# Patient Record
Sex: FEMALE | Race: WHITE | Employment: PART TIME | URBAN - METROPOLITAN AREA
[De-identification: names, ages, dates, MRNs, and addresses within clinical notes are randomized per-mention and may not be internally consistent; named-entity substitution may affect disease eponyms.]

---

## 2017-01-13 ENCOUNTER — HOSPITAL ENCOUNTER (OUTPATIENT)
Dept: ONCOLOGY | Age: 72
Discharge: HOME | End: 2017-01-31
Admitting: INTERNAL MEDICINE

## 2017-02-16 ENCOUNTER — HOSPITAL ENCOUNTER (OUTPATIENT)
Dept: ONCOLOGY | Age: 72
Discharge: HOME | End: 2017-02-16
Attending: INTERNAL MEDICINE | Admitting: INTERNAL MEDICINE

## 2017-02-16 ENCOUNTER — HOSPITAL ENCOUNTER (OUTPATIENT)
Dept: ONCOLOGY | Age: 72
Discharge: HOME | End: 2017-02-28
Attending: INTERNAL MEDICINE | Admitting: INTERNAL MEDICINE

## 2017-02-16 VITALS
DIASTOLIC BLOOD PRESSURE: 70 MMHG | SYSTOLIC BLOOD PRESSURE: 135 MMHG | RESPIRATION RATE: 16 BRPM | TEMPERATURE: 97.3 F | HEART RATE: 71 BPM

## 2017-02-16 DIAGNOSIS — D61.818 PANCYTOPENIA (HCC): ICD-10-CM

## 2017-02-16 DIAGNOSIS — D46.Z MDS (MYELODYSPLASTIC SYNDROME), HIGH GRADE (HCC): ICD-10-CM

## 2017-02-16 LAB
ABO/RH: NORMAL
ANTIBODY SCREEN: NORMAL
BLOOD BANK DISPENSE STATUS: NORMAL
BLOOD BANK PRODUCT CODE: NORMAL
BPU ID: NORMAL
DESCRIPTION BLOOD BANK: NORMAL

## 2017-02-16 RX ORDER — SODIUM CHLORIDE 9 MG/ML
INJECTION, SOLUTION INTRAVENOUS CONTINUOUS
Status: ACTIVE | OUTPATIENT
Start: 2017-02-16 | End: 2017-02-17

## 2017-02-16 RX ORDER — DIPHENHYDRAMINE HYDROCHLORIDE 50 MG/ML
50 INJECTION INTRAMUSCULAR; INTRAVENOUS ONCE
Status: CANCELLED | OUTPATIENT
Start: 2017-02-16 | End: 2017-02-16

## 2017-02-16 RX ORDER — SODIUM CHLORIDE 9 MG/ML
INJECTION, SOLUTION INTRAVENOUS CONTINUOUS
Status: CANCELLED | OUTPATIENT
Start: 2017-02-16

## 2017-02-16 RX ORDER — 0.9 % SODIUM CHLORIDE 0.9 %
10 VIAL (ML) INJECTION ONCE
Status: CANCELLED | OUTPATIENT
Start: 2017-02-16 | End: 2017-02-16

## 2017-02-16 RX ORDER — SODIUM CHLORIDE 0.9 % (FLUSH) 0.9 %
20 SYRINGE (ML) INJECTION PRN
Status: ACTIVE | OUTPATIENT
Start: 2017-02-16 | End: 2017-02-17

## 2017-02-16 RX ORDER — SODIUM CHLORIDE 0.9 % (FLUSH) 0.9 %
20 SYRINGE (ML) INJECTION PRN
Status: CANCELLED | OUTPATIENT
Start: 2017-02-16

## 2017-02-16 RX ORDER — ACETAMINOPHEN 325 MG/1
650 TABLET ORAL ONCE
Status: CANCELLED | OUTPATIENT
Start: 2017-02-16 | End: 2017-02-16

## 2017-02-16 RX ORDER — METHYLPREDNISOLONE SODIUM SUCCINATE 125 MG/2ML
125 INJECTION, POWDER, LYOPHILIZED, FOR SOLUTION INTRAMUSCULAR; INTRAVENOUS ONCE
Status: CANCELLED | OUTPATIENT
Start: 2017-02-16 | End: 2017-02-16

## 2017-02-16 RX ORDER — ACETAMINOPHEN 325 MG/1
650 TABLET ORAL ONCE
Status: DISCONTINUED | OUTPATIENT
Start: 2017-02-16 | End: 2017-02-18 | Stop reason: HOSPADM

## 2017-06-01 ENCOUNTER — HOSPITAL ENCOUNTER (OUTPATIENT)
Dept: ONCOLOGY | Age: 72
Discharge: HOME | End: 2017-06-30
Admitting: INTERNAL MEDICINE

## 2017-06-01 ENCOUNTER — TELEPHONE (OUTPATIENT)
Dept: SURGERY | Age: 72
End: 2017-06-01

## 2017-06-01 VITALS
HEART RATE: 80 BPM | WEIGHT: 139.99 LBS | BODY MASS INDEX: 24.8 KG/M2 | HEIGHT: 63 IN | DIASTOLIC BLOOD PRESSURE: 76 MMHG | SYSTOLIC BLOOD PRESSURE: 148 MMHG

## 2017-06-01 LAB
APTT: 31.1 SEC (ref 21–31.8)
BASOPHILS ABSOLUTE: 0.3 K/UL (ref 0–0.2)
BASOPHILS RELATIVE PERCENT: 7.6 %
BILIRUBIN DIRECT: <0.2 MG/DL (ref 0–0.3)
BILIRUBIN URINE: NEGATIVE
BLOOD, URINE: ABNORMAL
CLARITY: CLEAR
COLOR: YELLOW
EOSINOPHILS ABSOLUTE: 0 K/UL (ref 0–0.6)
EOSINOPHILS RELATIVE PERCENT: 0.4 %
FERRITIN: 206 NG/ML (ref 15–150)
GLUCOSE URINE: NEGATIVE MG/DL
HAV IGM SER IA-ACNC: NORMAL
HBV SURFACE AB TITR SER: <3.5 MUL/ML
HCT VFR BLD CALC: 34.2 % (ref 36–48)
HEMOGLOBIN: 11.3 G/DL (ref 12–16)
HEPATITIS B SURFACE ANTIGEN INTERPRETATION: NORMAL
HEPATITIS C ANTIBODY INTERPRETATION: NORMAL
INR BLD: 1.03 (ref 0.85–1.15)
KETONES, URINE: NEGATIVE MG/DL
LACTATE DEHYDROGENASE: 348 U/L (ref 100–190)
LEUKOCYTE ESTERASE, URINE: NEGATIVE
LYMPHOCYTES ABSOLUTE: 0.6 K/UL (ref 1–5.1)
LYMPHOCYTES RELATIVE PERCENT: 14.5 %
MACROCYTES: ABNORMAL
MCH RBC QN AUTO: 35.5 PG (ref 26–34)
MCHC RBC AUTO-ENTMCNC: 33.2 G/DL (ref 31–36)
MCV RBC AUTO: 106.9 FL (ref 80–100)
MICROSCOPIC EXAMINATION: YES
MONOCYTES ABSOLUTE: 0 K/UL (ref 0–1.3)
MONOCYTES RELATIVE PERCENT: 0.8 %
NEUTROPHILS ABSOLUTE: 3.4 K/UL (ref 1.7–7.7)
NEUTROPHILS RELATIVE PERCENT: 76.7 %
NITRITE, URINE: NEGATIVE
PDW BLD-RTO: 17.8 % (ref 12.4–15.4)
PH UA: 6
PHOSPHORUS: 3 MG/DL (ref 2.5–4.9)
PLATELET # BLD: 14 K/UL (ref 135–450)
PMV BLD AUTO: 10.5 FL (ref 5–10.5)
PROTEIN UA: NEGATIVE MG/DL
PROTHROMBIN TIME: 11.6 SEC (ref 9.6–13)
RBC # BLD: 3.19 M/UL (ref 4–5.2)
SPECIFIC GRAVITY UA: 1.01
URIC ACID, SERUM: 6.8 MG/DL (ref 2.6–6)
URINE TYPE: ABNORMAL
UROBILINOGEN, URINE: 0.2 E.U./DL
WBC # BLD: 4.4 K/UL (ref 4–11)

## 2017-06-01 RX ORDER — HEPARIN SODIUM (PORCINE) LOCK FLUSH IV SOLN 100 UNIT/ML 100 UNIT/ML
300 SOLUTION INTRAVENOUS PRN
Status: DISCONTINUED | OUTPATIENT
Start: 2017-06-01 | End: 2017-06-03 | Stop reason: HOSPADM

## 2017-06-01 RX ADMIN — HEPARIN SODIUM (PORCINE) LOCK FLUSH IV SOLN 100 UNIT/ML 300 UNITS: 100 SOLUTION at 11:31

## 2017-06-01 ASSESSMENT — PAIN SCALES - GENERAL: PAINLEVEL_OUTOF10: 0

## 2017-06-02 LAB — RPR: NORMAL

## 2017-06-03 LAB
EPSTEIN BARR VIRUS NUCLEAR AB IGG: 229 U/ML (ref 0–21.9)
EPSTEIN-BARR EARLY ANTIGEN ANTIBODY: 46.1 U/ML (ref 0–10.9)
EPSTEIN-BARR VCA IGG: >750 U/ML (ref 0–21.9)
EPSTEIN-BARR VCA IGM: <10 U/ML (ref 0–43.9)
HEPATITIS B CORE TOTAL ANTIBODY: NEGATIVE
HEPATITIS BE ANTIBODY: NEGATIVE
HERPES TYPE 1/2 IGM COMBINED: 1.52 IV
HERPES TYPE I/II IGG COMBINED: >22.4 IV
HIV RNA PCR INTERPRETATION: NOT DETECTED
HIV-1 RNA BY PCR, QN: <1.3 LOG
HIV-1 RNA BY PCR, QN: <20 CPY/ML
HTLV I/II AB: NEGATIVE
TOXOPLASMA GONDI AB IGM: <3 AU/ML
TOXOPLASMA GONDII AB IGG: <3 IU/ML

## 2017-06-04 LAB
EER HCV RNA QNT PCR: NORMAL
HCV RNA QNT REAL-TIME PCR INTERP: NOT DETECTED
HCV RNA, QUANTITATIVE REAL TIME PCR: <1.2 LOG IU
HEPATITIS C RNA PCR QUANT: <15 IU/ML
TRYPANOSOMA CRUZI IGM ANTIBODY: NORMAL
VARICELLA ZOSTER AB IGM: 0.39 ISR
VZV IGG SER QL IA: 1079 IV
WEST NILE VIRUS, IGG: 0.33 IV
WEST NILE VIRUS, IGM: 0.05 IV

## 2017-06-05 ENCOUNTER — HOSPITAL ENCOUNTER (OUTPATIENT)
Dept: PULMONOLOGY | Age: 72
Discharge: HOME | End: 2017-06-05
Attending: INTERNAL MEDICINE | Admitting: INTERNAL MEDICINE

## 2017-06-05 DIAGNOSIS — J32.9 CHRONIC SINUSITIS, UNSPECIFIED LOCATION: ICD-10-CM

## 2017-06-05 DIAGNOSIS — R06.02 SHORTNESS OF BREATH: ICD-10-CM

## 2017-06-05 LAB
CMV DNA QNT PCR: <2.6 LOG CPY/ML
CMV DNA QUANTATATIVE INTERPRETATION: <2.4 LOG IU/ML
CMV DNA QUANTATATIVE INTERPRETATION: NOT DETECTED
CMV DNA QUANTITATIVE: <227 IU/ML
CMV SOURCE: NORMAL
CMVQ COPY/ML: <390 CPY/ML
HIV-1 AND HIV-2 ANTIBODIES: NEGATIVE
LV EF: 58 %
LVEF MODALITY: NORMAL

## 2017-06-06 LAB — TRYPANOSOMA CRUZI IGG ANTIBODY: 0.38 IV

## 2017-06-12 ENCOUNTER — HOSPITAL ENCOUNTER (OUTPATIENT)
Dept: ONCOLOGY | Age: 72
Discharge: HOME | End: 2017-06-12
Attending: INTERNAL MEDICINE | Admitting: INTERNAL MEDICINE

## 2017-06-12 LAB
ABO/RH: NORMAL
ANTIBODY SCREEN: NORMAL
EKG ATRIAL RATE: 74 BPM
EKG DIAGNOSIS: NORMAL
EKG P AXIS: 68 DEGREES
EKG P-R INTERVAL: 142 MS
EKG Q-T INTERVAL: 380 MS
EKG QRS DURATION: 82 MS
EKG QTC CALCULATION (BAZETT): 421 MS
EKG R AXIS: 44 DEGREES
EKG T AXIS: 46 DEGREES
EKG VENTRICULAR RATE: 74 BPM

## 2017-06-12 PROCEDURE — 93010 ELECTROCARDIOGRAM REPORT: CPT | Performed by: INTERNAL MEDICINE

## 2017-06-12 RX ORDER — HEPARIN SODIUM (PORCINE) LOCK FLUSH IV SOLN 100 UNIT/ML 100 UNIT/ML
300 SOLUTION INTRAVENOUS PRN
Status: DISCONTINUED | OUTPATIENT
Start: 2017-06-12 | End: 2017-06-14 | Stop reason: HOSPADM

## 2017-06-12 RX ADMIN — HEPARIN SODIUM (PORCINE) LOCK FLUSH IV SOLN 100 UNIT/ML 300 UNITS: 100 SOLUTION at 14:54

## 2017-06-20 ENCOUNTER — HOSPITAL ENCOUNTER (OUTPATIENT)
Dept: PREADMISSION TESTING | Age: 72
Discharge: HOME | End: 2017-06-20
Attending: SURGERY | Admitting: SURGERY

## 2017-06-20 VITALS — WEIGHT: 142 LBS | HEIGHT: 63 IN | BODY MASS INDEX: 25.16 KG/M2

## 2017-06-20 RX ORDER — DIAPER,BRIEF,INFANT-TODD,DISP
EACH MISCELLANEOUS PRN
Status: ON HOLD | COMMUNITY
End: 2017-07-18 | Stop reason: HOSPADM

## 2017-06-22 PROBLEM — Z94.81 H/O ALLOGENEIC BONE MARROW TRANSPLANT (HCC): Status: ACTIVE | Noted: 2017-06-22

## 2017-07-25 PROBLEM — E83.42 HYPOMAGNESEMIA: Status: ACTIVE | Noted: 2017-07-25

## 2017-07-29 PROBLEM — Z48.290 ENCOUNTER FOR AFTERCARE FOLLOWING BONE MARROW TRANSPLANT (HCC): Status: ACTIVE | Noted: 2017-07-29

## 2017-07-29 PROBLEM — Z51.81 ENCOUNTER FOR THERAPEUTIC DRUG MONITORING: Status: ACTIVE | Noted: 2017-07-29

## 2017-08-16 PROBLEM — N17.9 AKI (ACUTE KIDNEY INJURY) (HCC): Status: ACTIVE | Noted: 2017-08-16

## 2017-10-18 ENCOUNTER — PROCEDURE VISIT (OUTPATIENT)
Dept: SURGERY | Age: 72
End: 2017-10-18

## 2017-10-18 VITALS — DIASTOLIC BLOOD PRESSURE: 78 MMHG | SYSTOLIC BLOOD PRESSURE: 120 MMHG | HEIGHT: 62 IN

## 2017-10-18 DIAGNOSIS — Z48.290 ENCOUNTER FOR AFTERCARE FOLLOWING BONE MARROW TRANSPLANT (HCC): Primary | ICD-10-CM

## 2017-10-18 PROCEDURE — 36589 REMOVAL TUNNELED CV CATH: CPT | Performed by: SURGERY

## 2017-10-18 PROCEDURE — 1036F TOBACCO NON-USER: CPT | Performed by: SURGERY

## 2017-10-18 NOTE — PROGRESS NOTES
TUNNELED CATHETER REMOVAL - PROCEDURE NOTE:    Patient here for right sided tunneled central venous catheter removal, as recommended by patient's oncologist. Most recent oncology note and CBC reviewed. Previous operative note reviewed as necessary. Informed consent obtained. Risks of procedure explained to patient, including but not limited to: bleeding, infection, need for subsequent procedures. RIght neck and upper chest prepped and draped in sterile fashion using chlorhexidine prep solution, including the catheter as it exits the skin. Previously placed sutures were cut. 10 cc of 1% lidocaine used for local anesthetic around the exit site. Curved hemostat used to slowly and methodically dissect the cuff free from subcutaneous tissues. Once cuff was confirmed mobile and free, the catheter was removed during full inhalation. Direct pressure was used to obtain hemostasis. Sterile guaze and Tegaderm used for dressing. Patient tolerated the procedure well without complication. Wound care and discharge instructions were explained to patient, as below. Patient discharged home. DISCHARGE INSTRUCTIONS:    You may shower in 24 hours. Remove the dressing before showering. Let water run over your incision site, but there is no need to scrub it. After showering, pat dry and cover with a small dry guaze and tegaderm dressing (or tape). After about 1 week, a Band-Aid can be used. Avoid tub baths and swimming until the wound has completely healed. Healing time may vary, but typically takes 2-4 weeks. You may have a small amount of bleeding or red drainage for a few days, but this will go away as the wound heals from the inside out. Call the office ([phone]) right away or come to the emergency department if you experience fever, chills, swelling, decreased appetite, worsening pain, redness, foul drainage, or uncontrolled bleeding from your wound.     Please call the office at [phone] if you have ANY

## 2018-01-08 ENCOUNTER — HOSPITAL ENCOUNTER (OUTPATIENT)
Dept: CT IMAGING | Age: 73
Discharge: HOME | End: 2018-01-08
Attending: INTERNAL MEDICINE | Admitting: INTERNAL MEDICINE

## 2018-01-08 DIAGNOSIS — Z48.290: ICD-10-CM

## 2018-01-08 DIAGNOSIS — R51.9 HEADACHE: ICD-10-CM

## 2018-07-30 ENCOUNTER — HOSPITAL ENCOUNTER (OUTPATIENT)
Dept: GENERAL RADIOLOGY | Age: 73
Discharge: HOME | End: 2018-07-30
Payer: COMMERCIAL

## 2018-07-30 ENCOUNTER — HOSPITAL ENCOUNTER (OUTPATIENT)
Dept: PULMONOLOGY | Age: 73
Discharge: HOME | End: 2018-07-30
Payer: COMMERCIAL

## 2018-07-30 VITALS — OXYGEN SATURATION: 96 %

## 2018-07-30 DIAGNOSIS — N95.1 MENOPAUSAL SYNDROME: ICD-10-CM

## 2018-07-30 DIAGNOSIS — Z13.820 ENCOUNTER FOR IMAGING TO ASSESS OSTEOPOROSIS: ICD-10-CM

## 2018-07-30 PROCEDURE — 94664 DEMO&/EVAL PT USE INHALER: CPT

## 2018-07-30 PROCEDURE — 94726 PLETHYSMOGRAPHY LUNG VOLUMES: CPT

## 2018-07-30 PROCEDURE — 77080 DXA BONE DENSITY AXIAL: CPT

## 2018-07-30 PROCEDURE — 94010 BREATHING CAPACITY TEST: CPT

## 2018-07-30 PROCEDURE — 94760 N-INVAS EAR/PLS OXIMETRY 1: CPT

## 2018-07-30 PROCEDURE — 94729 DIFFUSING CAPACITY: CPT

## 2018-07-30 RX ORDER — ALBUTEROL SULFATE 2.5 MG/3ML
2.5 SOLUTION RESPIRATORY (INHALATION) ONCE
Status: DISCONTINUED | OUTPATIENT
Start: 2018-07-30 | End: 2018-07-31 | Stop reason: HOSPADM

## 2018-10-16 ENCOUNTER — ANESTHESIA EVENT (OUTPATIENT)
Dept: ENDOSCOPY | Age: 73
End: 2018-10-16
Payer: COMMERCIAL

## 2018-10-16 ENCOUNTER — HOSPITAL ENCOUNTER (OUTPATIENT)
Age: 73
Setting detail: OUTPATIENT SURGERY
Discharge: HOME | End: 2018-10-16
Attending: INTERNAL MEDICINE | Admitting: INTERNAL MEDICINE
Payer: COMMERCIAL

## 2018-10-16 ENCOUNTER — ANESTHESIA (OUTPATIENT)
Dept: ENDOSCOPY | Age: 73
End: 2018-10-16
Payer: COMMERCIAL

## 2018-10-16 VITALS
WEIGHT: 119 LBS | DIASTOLIC BLOOD PRESSURE: 77 MMHG | OXYGEN SATURATION: 98 % | HEART RATE: 70 BPM | BODY MASS INDEX: 21.9 KG/M2 | HEIGHT: 62 IN | RESPIRATION RATE: 18 BRPM | TEMPERATURE: 97.9 F | SYSTOLIC BLOOD PRESSURE: 140 MMHG

## 2018-10-16 VITALS — OXYGEN SATURATION: 97 % | SYSTOLIC BLOOD PRESSURE: 138 MMHG | DIASTOLIC BLOOD PRESSURE: 77 MMHG

## 2018-10-16 PROCEDURE — 6360000002 HC RX W HCPCS: Performed by: NURSE ANESTHETIST, CERTIFIED REGISTERED

## 2018-10-16 PROCEDURE — 2500000003 HC RX 250 WO HCPCS: Performed by: NURSE ANESTHETIST, CERTIFIED REGISTERED

## 2018-10-16 PROCEDURE — 2580000003 HC RX 258: Performed by: NURSE ANESTHETIST, CERTIFIED REGISTERED

## 2018-10-16 PROCEDURE — C1726 CATH, BAL DIL, NON-VASCULAR: HCPCS | Performed by: INTERNAL MEDICINE

## 2018-10-16 PROCEDURE — 3700000000 HC ANESTHESIA ATTENDED CARE: Performed by: INTERNAL MEDICINE

## 2018-10-16 PROCEDURE — 7100000011 HC PHASE II RECOVERY - ADDTL 15 MIN: Performed by: INTERNAL MEDICINE

## 2018-10-16 PROCEDURE — 3700000001 HC ADD 15 MINUTES (ANESTHESIA): Performed by: INTERNAL MEDICINE

## 2018-10-16 PROCEDURE — 2709999900 HC NON-CHARGEABLE SUPPLY: Performed by: INTERNAL MEDICINE

## 2018-10-16 PROCEDURE — 3609017700 HC EGD DILATION GASTRIC/DUODENAL STRICTURE: Performed by: INTERNAL MEDICINE

## 2018-10-16 PROCEDURE — 7100000010 HC PHASE II RECOVERY - FIRST 15 MIN: Performed by: INTERNAL MEDICINE

## 2018-10-16 RX ORDER — PROPOFOL 10 MG/ML
INJECTION, EMULSION INTRAVENOUS PRN
Status: DISCONTINUED | OUTPATIENT
Start: 2018-10-16 | End: 2018-10-16 | Stop reason: SDUPTHER

## 2018-10-16 RX ORDER — TRIAMCINOLONE ACETONIDE 40 MG/ML
40 INJECTION, SUSPENSION INTRA-ARTICULAR; INTRAMUSCULAR ONCE
Status: DISCONTINUED | OUTPATIENT
Start: 2018-10-16 | End: 2018-10-16 | Stop reason: HOSPADM

## 2018-10-16 RX ORDER — ONDANSETRON 2 MG/ML
INJECTION INTRAMUSCULAR; INTRAVENOUS PRN
Status: DISCONTINUED | OUTPATIENT
Start: 2018-10-16 | End: 2018-10-16 | Stop reason: SDUPTHER

## 2018-10-16 RX ORDER — LIDOCAINE HYDROCHLORIDE 20 MG/ML
INJECTION, SOLUTION EPIDURAL; INFILTRATION; INTRACAUDAL; PERINEURAL PRN
Status: DISCONTINUED | OUTPATIENT
Start: 2018-10-16 | End: 2018-10-16 | Stop reason: SDUPTHER

## 2018-10-16 RX ORDER — SODIUM CHLORIDE, SODIUM LACTATE, POTASSIUM CHLORIDE, CALCIUM CHLORIDE 600; 310; 30; 20 MG/100ML; MG/100ML; MG/100ML; MG/100ML
INJECTION, SOLUTION INTRAVENOUS CONTINUOUS PRN
Status: DISCONTINUED | OUTPATIENT
Start: 2018-10-16 | End: 2018-10-16 | Stop reason: SDUPTHER

## 2018-10-16 RX ORDER — DEXLANSOPRAZOLE 60 MG/1
60 CAPSULE, DELAYED RELEASE ORAL DAILY
COMMUNITY
End: 2022-08-19

## 2018-10-16 RX ORDER — DAPSONE 100 MG/1
100 TABLET ORAL DAILY
Status: ON HOLD | COMMUNITY
Start: 2017-08-10 | End: 2018-10-16

## 2018-10-16 RX ORDER — GLYCOPYRROLATE 0.2 MG/ML
INJECTION INTRAMUSCULAR; INTRAVENOUS PRN
Status: DISCONTINUED | OUTPATIENT
Start: 2018-10-16 | End: 2018-10-16 | Stop reason: SDUPTHER

## 2018-10-16 RX ADMIN — PROPOFOL 50 MG: 10 INJECTION, EMULSION INTRAVENOUS at 14:28

## 2018-10-16 RX ADMIN — SODIUM CHLORIDE, SODIUM LACTATE, POTASSIUM CHLORIDE, AND CALCIUM CHLORIDE: 600; 310; 30; 20 INJECTION, SOLUTION INTRAVENOUS at 14:10

## 2018-10-16 RX ADMIN — PROPOFOL 50 MG: 10 INJECTION, EMULSION INTRAVENOUS at 14:24

## 2018-10-16 RX ADMIN — GLYCOPYRROLATE 0.1 MG: 0.2 INJECTION, SOLUTION INTRAMUSCULAR; INTRAVENOUS at 14:15

## 2018-10-16 RX ADMIN — ONDANSETRON 4 MG: 2 INJECTION INTRAMUSCULAR; INTRAVENOUS at 14:30

## 2018-10-16 RX ADMIN — LIDOCAINE HYDROCHLORIDE 50 MG: 20 INJECTION, SOLUTION EPIDURAL; INFILTRATION; INTRACAUDAL; PERINEURAL at 14:20

## 2018-10-16 RX ADMIN — LIDOCAINE HYDROCHLORIDE 50 MG: 20 INJECTION, SOLUTION EPIDURAL; INFILTRATION; INTRACAUDAL; PERINEURAL at 14:16

## 2018-10-16 RX ADMIN — PROPOFOL 50 MG: 10 INJECTION, EMULSION INTRAVENOUS at 14:19

## 2018-10-16 RX ADMIN — PROPOFOL 50 MG: 10 INJECTION, EMULSION INTRAVENOUS at 14:14

## 2018-10-16 ASSESSMENT — PULMONARY FUNCTION TESTS
PIF_VALUE: 0
PIF_VALUE: 1
PIF_VALUE: 0

## 2018-10-16 ASSESSMENT — PAIN - FUNCTIONAL ASSESSMENT: PAIN_FUNCTIONAL_ASSESSMENT: 0-10

## 2018-10-16 NOTE — ANESTHESIA PRE PROCEDURE
([facility] D61.818    Anemia D64.9    MUD - Allo BMT Z94.81    Hypomagnesemia E83.42    Encounter for aftercare following bone marrow transplant ([facility] Z48.290    Encounter for therapeutic drug monitoring Z51.81    DEMETRIA (acute kidney injury) ([facility] N17.9       Past Medical History:        Diagnosis Date    Cancer [facility])     MDS    Clostridium difficile diarrhea 2017    History of blood transfusion     Hot flashes     Pneumonia 2012    Thrombocytopenia [facility])        Past Surgical History:        Procedure Laterality Date     SECTION, CLASSIC      x 2    COLONOSCOPY  2013    polyp    LASIK      OTHER SURGICAL HISTORY  2017    INSERTION OF TRIPLE LUMEN AGUILAR CATHETER RIGHT SUBCLAVIAN.  TUBAL LIGATION      TUNNELED VENOUS PORT PLACEMENT  2015    PORT A CATH       Social History:    Social History   Substance Use Topics    Smoking status: Former Smoker     Years: 20.00     Quit date: 1/3/1985    Smokeless tobacco: Never Used      Comment: QUIT     Alcohol use 1.2 oz/week     2 Glasses of wine per week                                Counseling given: Not Answered      Vital Signs (Current):   Vitals:    10/16/18 1246   BP: (!) 157/75   Pulse: 70   Resp: 18   Temp: 97.9 °F (36.6 °C)   TempSrc: Oral   SpO2: 99%   Weight: 119 lb (54 kg)   Height: 5' 2\" (1.575 m)                                              BP Readings from Last 3 Encounters:   10/16/18 (!) 157/75   10/18/17 120/78   17 122/66       NPO Status: Time of last liquid consumption: 2100                        Time of last solid consumption: 1800                        Date of last liquid consumption: 10/15/18                        Date of last solid food consumption: 10/15/18    BMI:   Wt Readings from Last 3 Encounters:   10/16/18 119 lb (54 kg)   17 116 lb 3.2 oz (52.7 kg)   17 117 lb (53.1 kg)     Body mass index is 21.77 kg/m².     CBC:   Lab Results   Component Value Date    WBC 10.8 08/25/2017    WBC 2.1 07/18/2017    RBC 2.99 08/25/2017    HGB 10.3 08/25/2017    HCT 33.3 08/25/2017    .5 08/25/2017    RDW 18.7 08/25/2017     08/25/2017       CMP:   Lab Results   Component Value Date     08/25/2017    K 5.0 08/25/2017     08/25/2017    CO2 23 08/25/2017    BUN 37 08/25/2017    CREATININE 1.3 08/25/2017    GFRAA 54 07/18/2017    GFRAA >60 01/03/2013    AGRATIO 1.6 08/23/2017    LABGLOM 43.0 08/25/2017    GLUCOSE 97 08/25/2017    PROT 6.6 08/25/2017    CALCIUM 9.9 08/25/2017    BILITOT 0.8 08/25/2017    ALKPHOS 75 08/25/2017    ALKPHOS 77 07/17/2017    AST 29 08/25/2017    ALT 37 08/25/2017       POC Tests: No results for input(s): POCGLU, POCNA, POCK, POCCL, POCBUN, POCHEMO, POCHCT in the last 72 hours. Coags:   Lab Results   Component Value Date    PROTIME 14.9 07/17/2017    INR 1.32 07/17/2017    APTT 30.7 07/17/2017       HCG (If Applicable): No results found for: PREGTESTUR, PREGSERUM, HCG, HCGQUANT     ABGs: No results found for: PHART, PO2ART, GJP2CAU, QAB5LKY, BEART, R5AHHFCO     Type & Screen (If Applicable):  No results found for: LABABO, [address]    Anesthesia Evaluation  Patient summary reviewed and Nursing notes reviewed no history of anesthetic complications:   Airway: Mallampati: I  TM distance: >3 FB   Neck ROM: full  Mouth opening: > = 3 FB Dental: normal exam         Pulmonary:normal exam    (+) pneumonia:                             Cardiovascular:Negative CV ROS                      Neuro/Psych:   Negative Neuro/Psych ROS              GI/Hepatic/Renal: Neg GI/Hepatic/Renal ROS            Endo/Other: Negative Endo/Other ROS                     ROS comment: Myelodysplastic syndrome  GVHD Abdominal:           Vascular: negative vascular ROS. Anesthesia Plan      MAC     ASA 2             Anesthetic plan and risks discussed with patient and spouse. Plan discussed with CRNA.     Attending anesthesiologist reviewed and agrees with Pre Eval content              Riana Quiroga MD   10/16/2018

## 2019-01-07 ENCOUNTER — HOSPITAL ENCOUNTER (OUTPATIENT)
Dept: MAMMOGRAPHY | Age: 74
Discharge: HOME | End: 2019-01-07
Payer: COMMERCIAL

## 2019-01-07 DIAGNOSIS — Z12.31 VISIT FOR SCREENING MAMMOGRAM: ICD-10-CM

## 2019-01-07 PROCEDURE — 77063 BREAST TOMOSYNTHESIS BI: CPT

## 2019-08-05 ENCOUNTER — HOSPITAL ENCOUNTER (OUTPATIENT)
Dept: NON INVASIVE DIAGNOSTICS | Age: 74
Discharge: HOME | End: 2019-08-05
Payer: COMMERCIAL

## 2019-08-05 ENCOUNTER — HOSPITAL ENCOUNTER (OUTPATIENT)
Dept: PULMONOLOGY | Age: 74
Discharge: HOME | End: 2019-08-05
Payer: COMMERCIAL

## 2019-08-05 LAB
LV EF: 53 %
LVEF MODALITY: NORMAL

## 2019-08-05 PROCEDURE — 94010 BREATHING CAPACITY TEST: CPT

## 2019-08-05 PROCEDURE — 94375 RESPIRATORY FLOW VOLUME LOOP: CPT

## 2019-08-05 PROCEDURE — 94664 DEMO&/EVAL PT USE INHALER: CPT

## 2019-08-05 PROCEDURE — 94729 DIFFUSING CAPACITY: CPT

## 2019-08-05 PROCEDURE — 93306 TTE W/DOPPLER COMPLETE: CPT

## 2019-08-05 PROCEDURE — 94760 N-INVAS EAR/PLS OXIMETRY 1: CPT

## 2019-08-10 NOTE — PROCEDURES
[address]               [address] [place], [address]                               PULMONARY FUNCTION    PATIENT NAME: Maliha Nagy                  :        1945  MED REC NO:   [id number]                          ROOM:  ACCOUNT NO:   [de-identified]                           ADMIT DATE: 2019  PROVIDER:     Rachel Dave MD    DATE OF PROCEDURE:  2019    INTERPRETATION:  Spirometry on this patient shows an FEV1 of 1.68, which  is 81% of predicted and forced vital capacity of 2.21, which is also 80%  of predicted giving a ratio of 76. Lung volumes are within normal range  with a total lung of capacity of 112% and residual volume, which is  elevated to 145. Diffusion capacity was moderately decreased, but  corrected for alveolar lung volume. There was no hemoglobin adjustment  for this study. CONCLUSION:  Normal spirometry with elevated residual volume of  questionable clinical significance and low normal diffusion. There is  some concavity to patient's flow volume loops so there is a concern for  airways obstruction. A bronchodilator challenge may be beneficial.   There were no comparison studies for this PFT at the time of this  reading.         Renzo Hardy MD    D: 2019 17:46:39       T: 08/10/2019 2:49:03     [email or web address]  Job#: [id number]     Doc#: [id number]    CC:

## 2020-01-16 ENCOUNTER — HOSPITAL ENCOUNTER (OUTPATIENT)
Dept: ONCOLOGY | Age: 75
Setting detail: INFUSION SERIES
Discharge: HOME | End: 2020-01-16
Payer: COMMERCIAL

## 2020-01-16 LAB
RAPID INFLUENZA  B AGN: POSITIVE
RAPID INFLUENZA A AGN: NEGATIVE

## 2020-01-16 PROCEDURE — 89220 SPUTUM SPECIMEN COLLECTION: CPT

## 2020-01-16 PROCEDURE — 87804 INFLUENZA ASSAY W/OPTIC: CPT

## 2020-02-06 ENCOUNTER — HOSPITAL ENCOUNTER (OUTPATIENT)
Dept: GENERAL RADIOLOGY | Age: 75
Discharge: HOME | End: 2020-02-06
Payer: COMMERCIAL

## 2020-02-06 PROCEDURE — 71046 X-RAY EXAM CHEST 2 VIEWS: CPT

## 2020-02-24 ENCOUNTER — HOSPITAL ENCOUNTER (OUTPATIENT)
Dept: MAMMOGRAPHY | Age: 75
Discharge: HOME | End: 2020-02-24
Payer: COMMERCIAL

## 2020-02-24 PROCEDURE — 77063 BREAST TOMOSYNTHESIS BI: CPT

## 2020-07-06 ENCOUNTER — NURSE ONLY (OUTPATIENT)
Dept: PRIMARY CARE CLINIC | Age: 75
End: 2020-07-06
Payer: COMMERCIAL

## 2020-07-06 PROCEDURE — 99211 OFF/OP EST MAY X REQ PHY/QHP: CPT | Performed by: NURSE PRACTITIONER

## 2020-07-06 NOTE — PROGRESS NOTES
Dottie Ramos received a viral test for COVID-19. They were educated on isolation and quarantine as appropriate. For any symptoms, they were directed to seek care from their PCP, given contact information to establish with a doctor, directed to an urgent care or the emergency room. Swab and go Covid testing for preop.

## 2020-07-09 LAB
SARS-COV-2: NOT DETECTED
SOURCE: NORMAL

## 2020-07-10 ENCOUNTER — HOSPITAL ENCOUNTER (OUTPATIENT)
Dept: PULMONOLOGY | Age: 75
Discharge: HOME | End: 2020-07-10
Payer: COMMERCIAL

## 2020-07-10 ENCOUNTER — HOSPITAL ENCOUNTER (OUTPATIENT)
Dept: NON INVASIVE DIAGNOSTICS | Age: 75
Discharge: HOME | End: 2020-07-10
Payer: COMMERCIAL

## 2020-07-10 ENCOUNTER — HOSPITAL ENCOUNTER (OUTPATIENT)
Dept: GENERAL RADIOLOGY | Age: 75
Discharge: HOME | End: 2020-07-10
Payer: COMMERCIAL

## 2020-07-10 VITALS — OXYGEN SATURATION: 94 %

## 2020-07-10 LAB
LV EF: 58 %
LVEF MODALITY: NORMAL

## 2020-07-10 PROCEDURE — 94664 DEMO&/EVAL PT USE INHALER: CPT

## 2020-07-10 PROCEDURE — 94010 BREATHING CAPACITY TEST: CPT

## 2020-07-10 PROCEDURE — 94760 N-INVAS EAR/PLS OXIMETRY 1: CPT

## 2020-07-10 PROCEDURE — 94726 PLETHYSMOGRAPHY LUNG VOLUMES: CPT

## 2020-07-10 PROCEDURE — 93306 TTE W/DOPPLER COMPLETE: CPT

## 2020-07-10 PROCEDURE — 77080 DXA BONE DENSITY AXIAL: CPT

## 2020-07-10 PROCEDURE — 94729 DIFFUSING CAPACITY: CPT

## 2020-07-10 PROCEDURE — 93356 MYOCRD STRAIN IMG SPCKL TRCK: CPT

## 2020-11-19 ENCOUNTER — HOSPITAL ENCOUNTER (OUTPATIENT)
Dept: CT IMAGING | Age: 75
Discharge: HOME | End: 2020-11-19
Payer: COMMERCIAL

## 2020-11-19 PROCEDURE — 73700 CT LOWER EXTREMITY W/O DYE: CPT

## 2021-02-26 ENCOUNTER — HOSPITAL ENCOUNTER (OUTPATIENT)
Dept: MAMMOGRAPHY | Age: 76
Discharge: HOME | End: 2021-02-26
Payer: COMMERCIAL

## 2021-02-26 DIAGNOSIS — Z12.31 VISIT FOR SCREENING MAMMOGRAM: ICD-10-CM

## 2021-02-26 PROCEDURE — 77063 BREAST TOMOSYNTHESIS BI: CPT

## 2021-04-23 ENCOUNTER — HOSPITAL ENCOUNTER (OUTPATIENT)
Dept: GENERAL RADIOLOGY | Age: 76
Discharge: HOME | End: 2021-04-23
Payer: COMMERCIAL

## 2021-04-23 ENCOUNTER — HOSPITAL ENCOUNTER (OUTPATIENT)
Dept: VASCULAR LAB | Age: 76
Discharge: HOME | End: 2021-04-23
Payer: COMMERCIAL

## 2021-04-23 DIAGNOSIS — R60.0 LOCALIZED EDEMA: ICD-10-CM

## 2021-04-23 DIAGNOSIS — R60.0 LEG EDEMA: ICD-10-CM

## 2021-04-23 DIAGNOSIS — D46.20 MYELODYSPLASTIC SYNDROME, LOW GRADE (HCC): ICD-10-CM

## 2021-04-23 PROCEDURE — 93971 EXTREMITY STUDY: CPT

## 2021-04-23 PROCEDURE — 73562 X-RAY EXAM OF KNEE 3: CPT

## 2021-06-28 ENCOUNTER — HOSPITAL ENCOUNTER (OUTPATIENT)
Dept: NON INVASIVE DIAGNOSTICS | Age: 76
Discharge: HOME | End: 2021-06-28
Payer: COMMERCIAL

## 2021-06-28 ENCOUNTER — HOSPITAL ENCOUNTER (OUTPATIENT)
Dept: PULMONOLOGY | Age: 76
Discharge: HOME | End: 2021-06-28
Payer: COMMERCIAL

## 2021-06-28 DIAGNOSIS — Z48.290 ENCOUNTER FOR AFTERCARE FOLLOWING BONE MARROW TRANSPLANT (HCC): ICD-10-CM

## 2021-06-28 LAB
LV EF: 58 %
LVEF MODALITY: NORMAL

## 2021-06-28 PROCEDURE — 93306 TTE W/DOPPLER COMPLETE: CPT

## 2021-06-28 PROCEDURE — 94010 BREATHING CAPACITY TEST: CPT

## 2021-06-28 PROCEDURE — 94726 PLETHYSMOGRAPHY LUNG VOLUMES: CPT

## 2021-06-28 PROCEDURE — 93356 MYOCRD STRAIN IMG SPCKL TRCK: CPT

## 2021-06-28 PROCEDURE — 94729 DIFFUSING CAPACITY: CPT

## 2021-06-28 PROCEDURE — 94760 N-INVAS EAR/PLS OXIMETRY 1: CPT

## 2021-06-28 PROCEDURE — 94664 DEMO&/EVAL PT USE INHALER: CPT

## 2022-04-26 ENCOUNTER — HOSPITAL ENCOUNTER (OUTPATIENT)
Dept: MAMMOGRAPHY | Age: 77
Discharge: HOME | End: 2022-04-26
Payer: MEDICARE

## 2022-04-26 VITALS — BODY MASS INDEX: 23.37 KG/M2 | HEIGHT: 62 IN | WEIGHT: 127 LBS

## 2022-04-26 DIAGNOSIS — Z12.31 VISIT FOR SCREENING MAMMOGRAM: ICD-10-CM

## 2022-04-26 PROCEDURE — 77063 BREAST TOMOSYNTHESIS BI: CPT

## 2022-08-19 ENCOUNTER — OFFICE VISIT (OUTPATIENT)
Dept: DERMATOLOGY | Age: 77
End: 2022-08-19
Payer: MEDICARE

## 2022-08-19 DIAGNOSIS — D48.5 NEOPLASM OF UNCERTAIN BEHAVIOR OF SKIN: Primary | ICD-10-CM

## 2022-08-19 DIAGNOSIS — L81.4 LENTIGINES: ICD-10-CM

## 2022-08-19 DIAGNOSIS — L57.0 ACTINIC KERATOSIS: ICD-10-CM

## 2022-08-19 PROCEDURE — 1123F ACP DISCUSS/DSCN MKR DOCD: CPT | Performed by: INTERNAL MEDICINE

## 2022-08-19 PROCEDURE — 17000 DESTRUCT PREMALG LESION: CPT | Performed by: INTERNAL MEDICINE

## 2022-08-19 PROCEDURE — 17003 DESTRUCT PREMALG LES 2-14: CPT | Performed by: INTERNAL MEDICINE

## 2022-08-19 PROCEDURE — 99204 OFFICE O/P NEW MOD 45 MIN: CPT | Performed by: INTERNAL MEDICINE

## 2022-08-19 PROCEDURE — 11102 TANGNTL BX SKIN SINGLE LES: CPT | Performed by: INTERNAL MEDICINE

## 2022-08-19 RX ORDER — ATORVASTATIN CALCIUM 10 MG/1
TABLET, FILM COATED ORAL
COMMUNITY
Start: 2022-06-03

## 2022-08-19 RX ORDER — OMEPRAZOLE 20 MG/1
20 CAPSULE, DELAYED RELEASE ORAL
COMMUNITY
Start: 2022-03-25

## 2022-08-19 RX ORDER — ASPIRIN 81 MG/1
81 TABLET ORAL DAILY
COMMUNITY

## 2022-08-19 NOTE — PATIENT INSTRUCTIONS
Thank you for visiting [facility] today! Please follow the instructions below as we discussed in clinic:      I will call you with biopsy results in 7-10 days  I froze precancerous lesion called actinic keratosis on your chest and bilateral hands    Biopsy Wound Care Instructions  Cleanse the wound with mild soapy water daily. Gently dry the area. Apply Vaseline or petroleum jelly to the wound using a cotton tipped applicator. Cover with a clean bandage. Repeat this process until the biopsy site is healed. If you had stitches placed, continue treating the site until the stitches are removed. Remember to make an appointment to return to have your stitches removed by our staff. You may shower and bathe as usual.   ** Biopsy results generally take around 7 business days to come back. If you have not heard from us by then, please call the office at [phone] between 8AM and 4PM Monday through Friday. Cryosurgery (Freezing) Wound Care Instructions    AFTER THE PROCEDURE:   You will notice swelling and redness around the site. This is normal.   You may experience a sharp or sore feeling for the next several days. For this discomfort, you may take acetaminophen (Tylenol©). A blister may develop at the treated area, sometimes as soon as by the end of the day. After several days, the blister will subside and a scab will form. If the area is bumped or traumatized during the first few days following freezing, you may develop bleeding into the blister, forming a blood blister. This is nothing to be alarmed about. If the blister is tense, uncomfortable, or much larger than the site that was frozen, you may pop the blister along its edge with a sterile needle (boiled, heated under a flame, or cleaned with alcohol) to allow the fluid to drain out. If the blister does not bother you, no treatment is needed. Do NOT peel off the top of the blister roof. It will act as a dressing on top of your wound. WOUND CARE:   You may shower or bathe as usual, but avoid scrubbing the areas that have been frozen. Cleanse the site twice a day with mild soapy water, and then apply a thin film of white petrolatum (Vaseline©). You do not need to cover the area, but can if you prefer. Do NOT allow the site to become dry or crusted, or attempt to dry it out with rubbing alcohol or hydrogen peroxide. Continue this regimen until the area is pink and healed. Depending on the size and location of your cryosurgery site, healing may take 2 to 4 weeks. The area may continue to be pink for several weeks, and over the next few months may become darker or lighter than the surrounding skin. This may be a permanent change.

## 2022-08-19 NOTE — PROGRESS NOTES
[address] [facility]  Mariah Ludwig MD  [phone]    Date of Visit: 2022    [facility] 78 y.[facility] who presents for skin lesions. Chief Complaint:   Chief Complaint   Patient presents with    Skin Lesion     Both hands ankle left leg top of chest         History of Present Illness:    Concern: Bump on L ankle  Duration: Many months  Symptoms: None. Tried to drain it    Patient denies any other new or changing skin lesions. They would like all of their skin lesions to be evaluated for skin cancer on visible exam    Review of Systems:  Gen: Feels well, good sense of health. Skin: No new or changing moles, no history of keloids or hypertrophic scars. Heme: No abnormal bruising or bleeding. Past Medical History, Family History, Surgical History, Medications and Allergies reviewed. Past Medical History:   Diagnosis Date    Cancer [facility])     MDS    Clostridium difficile diarrhea 2017    History of blood transfusion     Hot flashes     Pneumonia     Thrombocytopenia [facility])      Past Surgical History:   Procedure Laterality Date     SECTION, CLASSIC      x 2    COLONOSCOPY  2013    polyp    LASIK  2003    OTHER SURGICAL HISTORY  2017    INSERTION OF TRIPLE LUMEN AGUILAR CATHETER RIGHT SUBCLAVIAN.     [place] E[place] BALLOON DILATION ESOPHAGUS <30 MM DIAM N/A 10/16/2018    ESOPHAGOGASTRODUODENOSCOPY WITH BALLOON DILATATION AND KENALOG WITH MAC  Balloon Dilatation to15mm 40mg kenalog injected in equal quadrants of dilation site performed by Russell Deng MD at [address]    TUNNELED VENOUS PORT PLACEMENT  2015    PORT A CATH       No Known Allergies  Outpatient Medications Marked as Taking for the 22 encounter (Office Visit) with Mima Martin MD   Medication Sig Dispense Refill    atorvastatin (LIPITOR) 10 MG tablet TAKE ONE TABLET BY MOUTH DAILY      aspirin 81 MG EC tablet Take 81 mg by mouth daily omeprazole (PRILOSEC) 20 MG delayed release capsule Take 20 mg by mouth 2 times daily (before meals)      CALCIUM-VITAMIN D PO Take 1 tablet by mouth 2 times daily (with meals)      penicillin v potassium (VEETID) 500 MG tablet Take 1 tablet by mouth 2 times daily 60 tablet 3         Physical Examination     Visible skin exam was conducted to include the scalp, face, lips/teeth, lids/conjunctiva, ears, neck, right and left hands and forearms and was normal with the following exceptions:  -ill defined keratotic pink macules on bilateral hands and chest x3 total  - Pink keratotic papule with scale on L ankle  - Multiple tan to light brown macules on face, shoulders and arms in a photo-distributed pattern      Assessment and Plan     1. Neoplasm of uncertain behavior of skin  L ankle  -Ddx includes SCC vs. Other  -The procedure was discussed in detail. We also reviewed the risks of bleeding, scar, and infection. A consent form was signed by the patient. The lesion to be removed was marked with a surgical pen on L ankle. Alcohol was used to cleanse the site. Local anesthesia was acheived with 1% lidocaine with epinephrine. Shave removal of the lesion was performed down to mid dermis using a razor blade. Hemostasis was achieved with aluminum chloride. The wound was dressed with petrolatum and covered with a bandage. Wound care instructions were reviewed. Specimen (s) sent to pathology. The specimen bottle(s) were appropriately labeled. The patient tolerated the procedure well and there were no immediate complications. 2. Actinic keratosis  - Counseled on diagnosis, etiology, natural disease course- including pre-malignant nature of lesions and association with sun exposure  - Wound care instructions provided  - Counseled on importance of daily sun protection (SPF 30+, UVA/UVB) and monthly self skin exams    Cryotherapy was discussed and patient agreed to proceed. Consent was obtained.   3 lesions were treated cryotherapy: chest, hands. 2 cycles of liquid nitrogen applied to each lesion for 5 seconds using a Cry-Ac cryo spray gun. Patient was educated regarding the potential risks of blister formation and discomfort. Wound care was discussed. The patient tolerated the procedure well and there were no immediate complications. Lentigines  -Benign, reassurance   - Reviewed relationship with sun exposure  - Rec daily sunscreen with SPF 30, broad spectrum    Return in about 6 months (around 2/19/2023), or if symptoms worsen or fail to improve. Note is transcribed using voice recognition software. Inadvertent computerized transcription errors may be present.     Quoc Villa MD

## 2022-08-24 LAB — DERMATOLOGY PATHOLOGY REPORT: NORMAL

## 2022-10-24 ENCOUNTER — HOSPITAL ENCOUNTER (OUTPATIENT)
Dept: INTERVENTIONAL RADIOLOGY/VASCULAR | Age: 77
Discharge: HOME | End: 2022-10-24
Payer: MEDICARE

## 2022-10-24 VITALS — HEIGHT: 63 IN | WEIGHT: 153 LBS | BODY MASS INDEX: 27.11 KG/M2 | TEMPERATURE: 97.9 F

## 2022-10-24 DIAGNOSIS — C92.01 ACUTE MYELOBLASTIC LEUKEMIA IN REMISSION (HCC): ICD-10-CM

## 2022-10-24 LAB
HCT VFR BLD CALC: 34.7 % (ref 36–48)
HEMOGLOBIN: 11.1 G/DL (ref 12–16)
INR BLD: 1.05 (ref 0.87–1.14)
MCH RBC QN AUTO: 27 PG (ref 26–34)
MCHC RBC AUTO-ENTMCNC: 32.1 G/DL (ref 31–36)
MCV RBC AUTO: 84 FL (ref 80–100)
PDW BLD-RTO: 17.6 % (ref 12.4–15.4)
PLATELET # BLD: 230 K/UL (ref 135–450)
PMV BLD AUTO: 6.7 FL (ref 5–10.5)
PROTHROMBIN TIME: 13.6 SEC (ref 11.7–14.5)
RBC # BLD: 4.13 M/UL (ref 4–5.2)
WBC # BLD: 4.6 K/UL (ref 4–11)

## 2022-10-24 PROCEDURE — 85027 COMPLETE CBC AUTOMATED: CPT

## 2022-10-24 PROCEDURE — 99152 MOD SED SAME PHYS/QHP 5/>YRS: CPT

## 2022-10-24 PROCEDURE — 36590 REMOVAL TUNNELED CV CATH: CPT

## 2022-10-24 PROCEDURE — 2500000003 HC RX 250 WO HCPCS

## 2022-10-24 PROCEDURE — 77001 FLUOROGUIDE FOR VEIN DEVICE: CPT

## 2022-10-24 PROCEDURE — 85610 PROTHROMBIN TIME: CPT

## 2022-10-24 PROCEDURE — 6360000002 HC RX W HCPCS

## 2022-10-24 NOTE — H&P
Patient:  Nayana Chávez   :   1945      Relevant clinical history, particularly as it involves the pending procedure, was reviewed and discussed. The procedure including risks, benefits, and alternatives was discussed at length with the patient (or designated family member) and all questions were answered. Informed consent to proceed with the procedure was given. Vital signs were monitored and documented by the Radiology nurse. Targeted physical examination  Heart : regular rate and rhythm  Lungs : clear, breathing easily  Condition : stable    [facility] nurses notes reviewed and agreed. Past Medical History:        Diagnosis Date    Cancer [facility])     MDS    Clostridium difficile diarrhea 2017    History of blood transfusion     Hot flashes     Pneumonia     Thrombocytopenia [facility])        Past Surgical History:           Procedure Laterality Date     SECTION, CLASSIC      x 2    COLONOSCOPY  2013    polyp    LASIK      OTHER SURGICAL HISTORY  2017    INSERTION OF TRIPLE LUMEN AGUILAR CATHETER RIGHT SUBCLAVIAN. [place] EGD BALLOON DILATION ESOPHAGUS <30 MM DIAM N/A 10/16/2018    ESOPHAGOGASTRODUODENOSCOPY WITH BALLOON DILATATION AND KENALOG WITH MAC  Balloon Dilatation to15mm 40mg kenalog injected in equal quadrants of dilation site performed by Asberry Cushing, MD at [address]    TUNNELED VENOUS PORT PLACEMENT  2015    PORT A CATH       Allergies:  Patient has no known allergies.     Medications:   Home Meds  Current Outpatient Medications on File Prior to Encounter   Medication Sig Dispense Refill    atorvastatin (LIPITOR) 10 MG tablet TAKE ONE TABLET BY MOUTH DAILY      aspirin 81 MG EC tablet Take 81 mg by mouth daily      omeprazole (PRILOSEC) 20 MG delayed release capsule Take 20 mg by mouth 2 times daily (before meals)      CALCIUM-VITAMIN D PO Take 1 tablet by mouth 2 times daily (with meals)      penicillin v potassium (VEETID) 500 MG tablet Take 1 tablet by mouth 2 times daily 60 tablet 3     No current facility-administered medications on file prior to encounter. Current Meds  No current facility-administered medications for this encounter.         ASA 1 - Normal health patient    II (soft palate, uvula, fauces visible)    Activity:  2 - Able to move 4 extremities voluntarily on command  Respiration:  2 - Able to breathe deeply and cough freely  Circulation:  2 - BP+/- 20mmHg of normal  Consciousness:  2 - Fully awake  Oxygen Saturation (color):  2 - Able to maintain oxygen saturation >92% on room air    Sedation : Moderate sedation planned    HPI / Treatment plan : Chest port removal

## 2023-02-17 ENCOUNTER — TRANSCRIBE ORDERS (OUTPATIENT)
Dept: ADMINISTRATIVE | Age: 78
End: 2023-02-17

## 2023-02-17 DIAGNOSIS — C92.01 ACUTE MYELOID LEUKEMIA IN REMISSION (HCC): Primary | ICD-10-CM

## 2023-02-17 DIAGNOSIS — M81.0 AGE RELATED OSTEOPOROSIS, UNSPECIFIED PATHOLOGICAL FRACTURE PRESENCE: ICD-10-CM

## 2023-03-15 ENCOUNTER — HOSPITAL ENCOUNTER (OUTPATIENT)
Age: 78
Setting detail: OUTPATIENT SURGERY
Discharge: HOME | End: 2023-03-15
Attending: INTERNAL MEDICINE | Admitting: INTERNAL MEDICINE
Payer: MEDICARE

## 2023-03-15 VITALS
DIASTOLIC BLOOD PRESSURE: 82 MMHG | OXYGEN SATURATION: 97 % | RESPIRATION RATE: 18 BRPM | HEIGHT: 62 IN | SYSTOLIC BLOOD PRESSURE: 167 MMHG | WEIGHT: 155 LBS | HEART RATE: 73 BPM | BODY MASS INDEX: 28.52 KG/M2 | TEMPERATURE: 97.3 F

## 2023-03-15 DIAGNOSIS — Z12.11 COLON CANCER SCREENING: ICD-10-CM

## 2023-03-15 PROCEDURE — 99152 MOD SED SAME PHYS/QHP 5/>YRS: CPT | Performed by: INTERNAL MEDICINE

## 2023-03-15 PROCEDURE — 7100000010 HC PHASE II RECOVERY - FIRST 15 MIN: Performed by: INTERNAL MEDICINE

## 2023-03-15 PROCEDURE — 88305 TISSUE EXAM BY PATHOLOGIST: CPT

## 2023-03-15 PROCEDURE — 6360000002 HC RX W HCPCS: Performed by: INTERNAL MEDICINE

## 2023-03-15 PROCEDURE — 2709999900 HC NON-CHARGEABLE SUPPLY: Performed by: INTERNAL MEDICINE

## 2023-03-15 PROCEDURE — 7100000011 HC PHASE II RECOVERY - ADDTL 15 MIN: Performed by: INTERNAL MEDICINE

## 2023-03-15 PROCEDURE — 3609010600 HC COLONOSCOPY POLYPECTOMY SNARE/COLD BIOPSY: Performed by: INTERNAL MEDICINE

## 2023-03-15 RX ORDER — SODIUM CHLORIDE, SODIUM LACTATE, POTASSIUM CHLORIDE, CALCIUM CHLORIDE 600; 310; 30; 20 MG/100ML; MG/100ML; MG/100ML; MG/100ML
INJECTION, SOLUTION INTRAVENOUS ONCE
Status: DISCONTINUED | OUTPATIENT
Start: 2023-03-15 | End: 2023-03-15 | Stop reason: HOSPADM

## 2023-03-15 RX ORDER — FENTANYL CITRATE 50 UG/ML
INJECTION, SOLUTION INTRAMUSCULAR; INTRAVENOUS PRN
Status: DISCONTINUED | OUTPATIENT
Start: 2023-03-15 | End: 2023-03-15 | Stop reason: ALTCHOICE

## 2023-03-15 RX ORDER — MIDAZOLAM HYDROCHLORIDE 1 MG/ML
INJECTION INTRAMUSCULAR; INTRAVENOUS PRN
Status: DISCONTINUED | OUTPATIENT
Start: 2023-03-15 | End: 2023-03-15 | Stop reason: ALTCHOICE

## 2023-03-15 ASSESSMENT — PAIN SCALES - GENERAL: PAINLEVEL_OUTOF10: 0

## 2023-03-15 ASSESSMENT — PAIN SCALES - WONG BAKER
WONGBAKER_NUMERICALRESPONSE: 2

## 2023-03-15 ASSESSMENT — PAIN - FUNCTIONAL ASSESSMENT: PAIN_FUNCTIONAL_ASSESSMENT: 0-10

## 2023-03-15 NOTE — PROGRESS NOTES
Ambulatory Surgery/Procedure Discharge Note    Vitals:    03/15/23 1230   BP: (!) 167/82   Pulse: 73   Resp: 18   Temp:    SpO2: 97%   BP meets jp discharge criteria     No intake/output data recorded. Restroom use offered before discharge. Yes    Pain assessment:  level of pain (1-10, 10 severe)  Pain Level: 0  Pt to Endoscopy recovery post Colonoscopy. Pt denies pain at this time. Pt denies nausea at this time, pt tolerating PO fluids well. Discharge instructions given to pt's  and he states understanding of these instructions. Pt and pt's  state that pt is \"ready to go. \"         Patient discharged to home/self care.  Patient discharged via wheel chair by transporter to waiting family/S.O.       3/15/2023 1:48 PM

## 2023-03-15 NOTE — DISCHARGE INSTRUCTIONS
ENDOSCOPY DISCHARGE INSTRUCTIONS:    Call the physician that did your procedure for any questions or concerns:           DR. Lee Silva:  [phone]               ACTIVITY:    There are potential side effects to the medications used for sedation and anesthesia during your procedure. These include:  Dizziness or light-headedness, confusion or memory loss, delayed reaction times, loss of coordination, nausea and vomiting. Because of your increased risk for injury, we ask that you observe the following precautions: For the next 24 hours,  DO NOT operate an automobile, bicycle, motorcycle, , power tools or large equipment of any kind. Do not drink alcohol, sign any legal documents or make any legal decisions for 24 hours. Do not bend your head over lower than your heart. DO sit on the side of bed/couch awhile before getting up. Plan on bedrest or quiet relaxation today. You may resume normal activities in 24 hours. DIET:    Your first meal today should be light, avoiding spicy and fatty foods. If you tolerate this first meal,  then you may advance to your regular diet unless otherwise advised by your physician. NORMAL SYMPTOMS:  -Sore throat if youve had an EGD  -Gaseous discomfort    NOTIFY YOUR PHYSICIAN IF THESE SYMPTOMS OCCUR:  1. Fever (greater than 100)  5. Increased abdominal bloating  2. Severe pain    6. Excessive bleeding  3. Nausea and vomiting  7. Chest pain                                                                    4. Chills    8. Shortness of breath      ADDITIONAL INSTRUCTIONS:    Biopsy results:  WILL CALL YOU IN 1 WEEK WITH BIOPSY RESULTS. Educational Information: Sigmoid Colon Polyps, Resume Aspirin tomorrow 3/16/2023    Follow up appointment:                               Please review these discharge instructions this evening or tomorrow for   information you may have forgotten.              Colon Polyps: Care Instructions  Your Care Instructions     Colon polyps are growths in the colon or the rectum. The cause of most colon polyps is not known, and most people who get them do not have any problems. But a certain kind can turn into cancer. For this reason, regular testing for colon polyps is important for people as they get older. It is also important for anyone who has an increased risk for colon cancer.  Polyps are usually found through routine colon cancer screening tests. Although most colon polyps are not cancerous, they are usually removed and then tested for cancer. Screening for colon cancer saves lives because the cancer can usually be cured if it is caught early.  If you have a polyp that is the type that can turn into cancer, you may need more tests to examine your entire colon. The doctor will remove any other polyps that are found, and you will be tested more often.  Follow-up care is a key part of your treatment and safety. Be sure to make and go to all appointments, and call your doctor if you are having problems. It's also a good idea to know your test results and keep a list of the medicines you take.  How can you care for yourself at home?  Regular exams to look for colon polyps are the best way to prevent polyps from turning into colon cancer. These can include stool tests, sigmoidoscopy, colonoscopy, and CT colonography. Talk with your doctor about a testing schedule that is right for you.  To prevent polyps  There is no home treatment that can prevent colon polyps. But these steps may help lower your risk for cancer.  Stay active. Being active can help you get to and stay at a healthy weight. Try to exercise on most days of the week. Walking is a good choice.  Eat well. Choose a variety of vegetables, fruits, legumes (such as peas and beans), fish, poultry, and whole grains.  Do not smoke. If you need help quitting, talk to your doctor about stop-smoking programs and medicines. These can increase your chances of quitting for good.  If you drink  alcohol, limit how much you drink. Limit alcohol to 2 drinks a day for men and 1 drink a day for women. When should you call for help? Call your doctor now or seek immediate medical care if:    You have severe belly pain. Your stools are maroon or very bloody. Watch closely for changes in your health, and be sure to contact your doctor if:    You have a fever. You have nausea or vomiting. You have a change in bowel habits (new constipation or diarrhea). Your symptoms get worse or are not improving as expected. We want to thank you for choosing the [facility] as your health care provider. We always strive to provide you with excellent care while you are here. You may receive a survey in the mail regarding your care. We would appreciate you taking a few minutes of your time to complete this survey.  Again, thank you for choosing the [facility]

## 2023-03-15 NOTE — H&P
History and Physical / Pre-Sedation Assessment    Ceci Friend is a 78 y.o. female who presents today for colonoscopy procedure. PMHx:    Past Medical History:   Diagnosis Date    Cancer ([address]     MDS    Clostridium difficile diarrhea 2017    History of blood transfusion     Hot flashes     Hyperlipidemia     Pneumonia 2012    Thrombocytopenia (HCC)        Medications:    Prior to Admission medications    Medication Sig Start Date End Date Taking? Authorizing Provider   atorvastatin (LIPITOR) 10 MG tablet TAKE ONE TABLET BY MOUTH DAILY 6/3/22   Historical Provider, MD   aspirin 81 MG EC tablet Take 81 mg by mouth daily    Historical Provider, MD   omeprazole (PRILOSEC) 20 MG delayed release capsule Take 20 mg by mouth 2 times daily (before meals) 3/25/22   Historical Provider, MD   CALCIUM-VITAMIN D PO Take 1 tablet by mouth 2 times daily (with meals)    Historical Provider, MD   penicillin v potassium (VEETID) 500 MG tablet Take 1 tablet by mouth 2 times daily 8/10/17   Neda Centeno MD       Allergies: No Known Allergies    PSHx:    Past Surgical History:   Procedure Laterality Date     SECTION, CLASSIC      x 2    COLONOSCOPY  2013    polyp    LASIK      OTHER SURGICAL HISTORY  2017    INSERTION OF TRIPLE LUMEN AGUILAR CATHETER RIGHT SUBCLAVIAN.     CT EGD BALLOON DILATION ESOPHAGUS <30 MM DIAM N/A 10/16/2018    ESOPHAGOGASTRODUODENOSCOPY WITH BALLOON DILATATION AND KENALOG WITH MAC  Balloon Dilatation to15mm 40mg kenalog injected in equal quadrants of dilation site performed by Argentina Mane MD at [address]    TUNNELED VENOUS PORT PLACEMENT  2015    PORT A CATH       Social Hx:    Social History     Socioeconomic History    Marital status:      Spouse name: Not on file    Number of children: Not on file    Years of education: Not on file    Highest education level: Not on file   Occupational History    Not on file Tobacco Use    Smoking status: Former     Years: 20.00     Types: Cigarettes     Quit date: 1/3/1985     Years since quittin.2    Smokeless tobacco: Never    Tobacco comments:     QUIT    Vaping Use    Vaping Use: Never used   Substance and Sexual Activity    Alcohol use: Yes     Alcohol/week: 2.0 standard drinks     Types: 2 Glasses of wine per week    Drug use: No    Sexual activity: Not on file   Other Topics Concern    Not on file   Social History Narrative    Not on file     Social Determinants of Health     Financial Resource Strain: Not on file   Food Insecurity: Not on file   Transportation Needs: Not on file   Physical Activity: Not on file   Stress: Not on file   Social Connections: Not on file   Intimate Partner Violence: Not on file   Housing Stability: Not on file       Family Hx:   Family History   Problem Relation Age of Onset    Heart Disease Father     High Blood Pressure Mother        Physical Exam:  Vital Signs: /87   Pulse 87   Temp 97 °F (36.1 °C) (Temporal)   Resp 16   Ht 5' 2\" (1.575 m)   Wt 155 lb (70.3 kg)   SpO2 97%   BMI 28.35 kg/m²    Pulmonary: Normal  Cardiac: Normal  Abdomen: Normal    Pre-Procedure Assessment / Plan:  ASA Classification: Class 2 - A normal healthy patient with mild systemic disease  Level of Sedation Plan:  Moderate sedation   Mallampati Score: II (soft palate, uvula, fauces visible)  Post Procedure plan: Return to same level of care    Colonoscopy Interval History:  3 or more years since last colonoscopy, Less than 3 years since the patient's last colonoscopy due to medical reasons, and Less than 3 years since the patient's last colonoscopy due to system reasons    Medical Reason for Colonoscopy before 3 years last colonoscopy incomplete, last colonoscopy had inadequate prep, piecemeal removal of adenomas, and last colonoscopy found greater than 10 adenomas  System Reasons for Colonoscopy before 3 years:   previous colonoscopy report unavailable or unable to locate    I assessed the patient and find that the patient is in satisfactory condition to proceed with the planned procedure and sedation plan. Risks/benefits/alternatives of procedure discussed with patient and any present family members. Risks including, but not limited to: bleeding, perforation, post polypectomy syndrome, splenic injury, need for additional procedures or surgery, risks of anesthesia. Patient understands it is their responsibility to call office for pathology results if they do not hear from my office within 1-2 weeks. All questions answered.     Carlos A Shaver MD  3/15/2023

## 2023-05-22 ENCOUNTER — HOSPITAL ENCOUNTER (OUTPATIENT)
Dept: PULMONOLOGY | Age: 78
Discharge: HOME | End: 2023-05-22
Payer: MEDICARE

## 2023-05-22 ENCOUNTER — HOSPITAL ENCOUNTER (OUTPATIENT)
Dept: NON INVASIVE DIAGNOSTICS | Age: 78
Discharge: HOME | End: 2023-05-22
Payer: MEDICARE

## 2023-05-22 ENCOUNTER — HOSPITAL ENCOUNTER (OUTPATIENT)
Dept: GENERAL RADIOLOGY | Age: 78
Discharge: HOME | End: 2023-05-22
Payer: MEDICARE

## 2023-05-22 DIAGNOSIS — C92.01 ACUTE MYELOID LEUKEMIA IN REMISSION (HCC): ICD-10-CM

## 2023-05-22 DIAGNOSIS — M81.0 AGE RELATED OSTEOPOROSIS, UNSPECIFIED PATHOLOGICAL FRACTURE PRESENCE: ICD-10-CM

## 2023-05-22 DIAGNOSIS — R94.31 ABNORMAL ELECTROCARDIOGRAM (ECG) (EKG): ICD-10-CM

## 2023-05-22 DIAGNOSIS — C92.91 MYELOID LEUKEMIA IN REMISSION, UNSPECIFIED MYELOID LEUKEMIA TYPE (HCC): ICD-10-CM

## 2023-05-22 LAB
LV EF: 58 %
LVEF MODALITY: NORMAL

## 2023-05-22 PROCEDURE — 77080 DXA BONE DENSITY AXIAL: CPT

## 2023-05-22 PROCEDURE — 94060 EVALUATION OF WHEEZING: CPT

## 2023-05-22 PROCEDURE — 94729 DIFFUSING CAPACITY: CPT

## 2023-05-22 PROCEDURE — 94726 PLETHYSMOGRAPHY LUNG VOLUMES: CPT

## 2023-05-22 PROCEDURE — 94760 N-INVAS EAR/PLS OXIMETRY 1: CPT

## 2023-05-22 PROCEDURE — 93306 TTE W/DOPPLER COMPLETE: CPT

## 2023-05-22 PROCEDURE — 6360000002 HC RX W HCPCS: Performed by: INTERNAL MEDICINE

## 2023-05-22 PROCEDURE — 94664 DEMO&/EVAL PT USE INHALER: CPT

## 2023-05-22 RX ORDER — ALBUTEROL SULFATE 2.5 MG/3ML
2.5 SOLUTION RESPIRATORY (INHALATION) EVERY 6 HOURS PRN
Status: DISCONTINUED | OUTPATIENT
Start: 2023-05-22 | End: 2023-05-22

## 2023-05-22 RX ADMIN — ALBUTEROL SULFATE 2.5 MG: 2.5 SOLUTION RESPIRATORY (INHALATION) at 12:54

## 2023-05-25 NOTE — PROCEDURES
Pulmonary Function Test:     Indication: Acute myeloid leukemia    Smoked for 13 year    Test comment:     Spirometry data is acceptable and reproducible. Maximum effort given during the test.    Pulse ox is 100% on room air    Estimated body mass index is 28.35 kg/m² as calculated from the following:    Height as of 3/15/23: 5' 2\" (1.575 m). Weight as of 3/15/23: 155 lb (70.3 kg). Spirometry data:    FEV1/FVC: 78. Predicted ratio 78    Pre-Bronchodilator FEV1 1.65L, which is 85% predicted    Post-Bronchodilator FEV1 1.8L, which is 93% predicted    There is 9% reversibility     FVC is 2.3L, which is 92% predicted    Lung Volumes:    TLC (by Plethysmography) is 4.67L, which is 98% predicted    RV is 2.38L which is 108% predicted    Diffusion Capacity:    DLCO is 14.33 which is 81% predicted    Impression:  1. There is no obstruction present  2. There is no significant reversibility with bronchodilator [Increase in FEV1 => 12% of control and => 200 ml]. There is 36% improvement in flow in small air way that did not translate to significant improvement in FEV1 or FVC per GOLD and ATS. However there was 200 mL improvement in FVC, 150 mL improvement in FEV1 and 9% improvement in both with bronchodilator   3. There is no significant hyperinflation or air trapping  4.  There is no reduction in diffusion capacity    Comment:   Normal PFT

## 2023-07-06 ENCOUNTER — HOSPITAL ENCOUNTER (OUTPATIENT)
Dept: MAMMOGRAPHY | Age: 78
Discharge: HOME | End: 2023-07-06
Payer: MEDICARE

## 2023-07-06 VITALS — HEIGHT: 63 IN | BODY MASS INDEX: 26.75 KG/M2 | WEIGHT: 151 LBS

## 2023-07-06 DIAGNOSIS — Z12.39 SCREENING BREAST EXAMINATION: ICD-10-CM

## 2023-07-06 PROCEDURE — 77063 BREAST TOMOSYNTHESIS BI: CPT

## 2024-07-08 ENCOUNTER — HOSPITAL ENCOUNTER (OUTPATIENT)
Dept: MAMMOGRAPHY | Age: 79
Discharge: HOME | End: 2024-07-08
Payer: MEDICARE

## 2024-07-08 VITALS — BODY MASS INDEX: 25.69 KG/M2 | HEIGHT: 63 IN | WEIGHT: 145 LBS

## 2024-07-08 DIAGNOSIS — Z12.31 VISIT FOR SCREENING MAMMOGRAM: ICD-10-CM

## 2024-07-08 PROCEDURE — 77063 BREAST TOMOSYNTHESIS BI: CPT

## 2025-01-22 ENCOUNTER — APPOINTMENT (OUTPATIENT)
Dept: GENERAL RADIOLOGY | Age: 80
DRG: 003 | End: 2025-01-22
Payer: MEDICARE

## 2025-01-22 ENCOUNTER — HOSPITAL ENCOUNTER (INPATIENT)
Age: 80
LOS: 21 days | Discharge: HOSPICE | DRG: 003 | End: 2025-02-12
Attending: EMERGENCY MEDICINE | Admitting: STUDENT IN AN ORGANIZED HEALTH CARE EDUCATION/TRAINING PROGRAM
Payer: MEDICARE

## 2025-01-22 ENCOUNTER — APPOINTMENT (OUTPATIENT)
Dept: CT IMAGING | Age: 80
DRG: 003 | End: 2025-01-22
Payer: MEDICARE

## 2025-01-22 ENCOUNTER — ANESTHESIA EVENT (OUTPATIENT)
Dept: OPERATING ROOM | Age: 80
End: 2025-01-22
Payer: MEDICARE

## 2025-01-22 ENCOUNTER — ANESTHESIA (OUTPATIENT)
Dept: OPERATING ROOM | Age: 80
End: 2025-01-22
Payer: MEDICARE

## 2025-01-22 DIAGNOSIS — I62.9 INTRACRANIAL HEMORRHAGE (HCC): Primary | ICD-10-CM

## 2025-01-22 DIAGNOSIS — J96.90 RESPIRATORY FAILURE REQUIRING INTUBATION: ICD-10-CM

## 2025-01-22 DIAGNOSIS — T14.8XXA HEMATOMA: ICD-10-CM

## 2025-01-22 PROBLEM — G91.1 OBSTRUCTIVE HYDROCEPHALUS (HCC): Status: ACTIVE | Noted: 2025-01-22

## 2025-01-22 LAB
ABO + RH BLD: NORMAL
ALBUMIN SERPL-MCNC: 4.5 G/DL (ref 3.4–5)
ALBUMIN/GLOB SERPL: 1.3 {RATIO} (ref 1.1–2.2)
ALP SERPL-CCNC: 84 U/L (ref 40–129)
ALT SERPL-CCNC: ABNORMAL U/L (ref 10–40)
ANION GAP SERPL CALCULATED.3IONS-SCNC: 12 MMOL/L (ref 3–16)
ANION GAP SERPL CALCULATED.3IONS-SCNC: 16 MMOL/L (ref 3–16)
APTT BLD: 24.6 SEC (ref 22.1–36.4)
AST SERPL-CCNC: 56 U/L (ref 15–37)
BASOPHILS # BLD: 0 K/UL (ref 0–0.2)
BASOPHILS NFR BLD: 0.4 %
BILIRUB SERPL-MCNC: 0.9 MG/DL (ref 0–1)
BLD GP AB SCN SERPL QL: NORMAL
BUN SERPL-MCNC: 11 MG/DL (ref 7–20)
BUN SERPL-MCNC: 13 MG/DL (ref 7–20)
CALCIUM SERPL-MCNC: 7.6 MG/DL (ref 8.3–10.6)
CALCIUM SERPL-MCNC: 9.6 MG/DL (ref 8.3–10.6)
CHLORIDE SERPL-SCNC: 107 MMOL/L (ref 99–110)
CHLORIDE SERPL-SCNC: 99 MMOL/L (ref 99–110)
CO2 SERPL-SCNC: 18 MMOL/L (ref 21–32)
CO2 SERPL-SCNC: 19 MMOL/L (ref 21–32)
CREAT SERPL-MCNC: 0.6 MG/DL (ref 0.6–1.2)
CREAT SERPL-MCNC: 0.9 MG/DL (ref 0.6–1.2)
DEPRECATED RDW RBC AUTO: 13.3 % (ref 12.4–15.4)
EKG ATRIAL RATE: 63 BPM
EKG DIAGNOSIS: NORMAL
EKG P AXIS: 50 DEGREES
EKG P-R INTERVAL: 168 MS
EKG Q-T INTERVAL: 476 MS
EKG QRS DURATION: 92 MS
EKG QTC CALCULATION (BAZETT): 487 MS
EKG R AXIS: 25 DEGREES
EKG T AXIS: 42 DEGREES
EKG VENTRICULAR RATE: 63 BPM
EOSINOPHIL # BLD: 0.1 K/UL (ref 0–0.6)
EOSINOPHIL NFR BLD: 1.1 %
GFR SERPLBLD CREATININE-BSD FMLA CKD-EPI: 66 ML/MIN/{1.73_M2}
GFR SERPLBLD CREATININE-BSD FMLA CKD-EPI: >90 ML/MIN/{1.73_M2}
GLUCOSE BLD-MCNC: 145 MG/DL (ref 70–99)
GLUCOSE SERPL-MCNC: 139 MG/DL (ref 70–99)
GLUCOSE SERPL-MCNC: 156 MG/DL (ref 70–99)
HCT VFR BLD AUTO: 45.9 % (ref 36–48)
HGB BLD-MCNC: 15.6 G/DL (ref 12–16)
INR PPP: 0.93 (ref 0.85–1.15)
LYMPHOCYTES # BLD: 1.6 K/UL (ref 1–5.1)
LYMPHOCYTES NFR BLD: 16.8 %
MCH RBC QN AUTO: 34.7 PG (ref 26–34)
MCHC RBC AUTO-ENTMCNC: 34 G/DL (ref 31–36)
MCV RBC AUTO: 102.1 FL (ref 80–100)
MONOCYTES # BLD: 0.5 K/UL (ref 0–1.3)
MONOCYTES NFR BLD: 5.5 %
NEUTROPHILS # BLD: 7.1 K/UL (ref 1.7–7.7)
NEUTROPHILS NFR BLD: 76.2 %
PERFORMED ON: ABNORMAL
PLATELET # BLD AUTO: 201 K/UL (ref 135–450)
PMV BLD AUTO: 6.8 FL (ref 5–10.5)
POTASSIUM SERPL-SCNC: ABNORMAL MMOL/L (ref 3.5–5.1)
POTASSIUM SERPL-SCNC: ABNORMAL MMOL/L (ref 3.5–5.1)
PROT SERPL-MCNC: 8.1 G/DL (ref 6.4–8.2)
PROTHROMBIN TIME: 12.7 SEC (ref 11.9–14.9)
RBC # BLD AUTO: 4.5 M/UL (ref 4–5.2)
REASON FOR REJECTION: NORMAL
REJECTED TEST: NORMAL
SODIUM SERPL-SCNC: 134 MMOL/L (ref 136–145)
SODIUM SERPL-SCNC: 137 MMOL/L (ref 136–145)
TROPONIN, HIGH SENSITIVITY: 11 NG/L (ref 0–14)
WBC # BLD AUTO: 9.4 K/UL (ref 4–11)

## 2025-01-22 PROCEDURE — 2000000000 HC ICU R&B

## 2025-01-22 PROCEDURE — 2500000003 HC RX 250 WO HCPCS: Performed by: STUDENT IN AN ORGANIZED HEALTH CARE EDUCATION/TRAINING PROGRAM

## 2025-01-22 PROCEDURE — 2580000003 HC RX 258: Performed by: NURSE ANESTHETIST, CERTIFIED REGISTERED

## 2025-01-22 PROCEDURE — 70450 CT HEAD/BRAIN W/O DYE: CPT

## 2025-01-22 PROCEDURE — 2720000010 HC SURG SUPPLY STERILE: Performed by: STUDENT IN AN ORGANIZED HEALTH CARE EDUCATION/TRAINING PROGRAM

## 2025-01-22 PROCEDURE — 99291 CRITICAL CARE FIRST HOUR: CPT | Performed by: PSYCHIATRY & NEUROLOGY

## 2025-01-22 PROCEDURE — 70496 CT ANGIOGRAPHY HEAD: CPT

## 2025-01-22 PROCEDURE — 6360000002 HC RX W HCPCS

## 2025-01-22 PROCEDURE — 3600000004 HC SURGERY LEVEL 4 BASE: Performed by: STUDENT IN AN ORGANIZED HEALTH CARE EDUCATION/TRAINING PROGRAM

## 2025-01-22 PROCEDURE — 96365 THER/PROPH/DIAG IV INF INIT: CPT

## 2025-01-22 PROCEDURE — 96366 THER/PROPH/DIAG IV INF ADDON: CPT

## 2025-01-22 PROCEDURE — C1729 CATH, DRAINAGE: HCPCS | Performed by: STUDENT IN AN ORGANIZED HEALTH CARE EDUCATION/TRAINING PROGRAM

## 2025-01-22 PROCEDURE — 0BH17EZ INSERTION OF ENDOTRACHEAL AIRWAY INTO TRACHEA, VIA NATURAL OR ARTIFICIAL OPENING: ICD-10-PCS | Performed by: PSYCHIATRY & NEUROLOGY

## 2025-01-22 PROCEDURE — 5A1955Z RESPIRATORY VENTILATION, GREATER THAN 96 CONSECUTIVE HOURS: ICD-10-PCS | Performed by: PSYCHIATRY & NEUROLOGY

## 2025-01-22 PROCEDURE — 85610 PROTHROMBIN TIME: CPT

## 2025-01-22 PROCEDURE — 94002 VENT MGMT INPAT INIT DAY: CPT

## 2025-01-22 PROCEDURE — 00CC0ZZ EXTIRPATION OF MATTER FROM CEREBELLUM, OPEN APPROACH: ICD-10-PCS | Performed by: STUDENT IN AN ORGANIZED HEALTH CARE EDUCATION/TRAINING PROGRAM

## 2025-01-22 PROCEDURE — 96375 TX/PRO/DX INJ NEW DRUG ADDON: CPT

## 2025-01-22 PROCEDURE — 6360000002 HC RX W HCPCS: Performed by: STUDENT IN AN ORGANIZED HEALTH CARE EDUCATION/TRAINING PROGRAM

## 2025-01-22 PROCEDURE — C1713 ANCHOR/SCREW BN/BN,TIS/BN: HCPCS | Performed by: STUDENT IN AN ORGANIZED HEALTH CARE EDUCATION/TRAINING PROGRAM

## 2025-01-22 PROCEDURE — 86900 BLOOD TYPING SEROLOGIC ABO: CPT

## 2025-01-22 PROCEDURE — 2500000003 HC RX 250 WO HCPCS: Performed by: NURSE ANESTHETIST, CERTIFIED REGISTERED

## 2025-01-22 PROCEDURE — 2500000003 HC RX 250 WO HCPCS

## 2025-01-22 PROCEDURE — 6360000002 HC RX W HCPCS: Performed by: NURSE PRACTITIONER

## 2025-01-22 PROCEDURE — 3600000014 HC SURGERY LEVEL 4 ADDTL 15MIN: Performed by: STUDENT IN AN ORGANIZED HEALTH CARE EDUCATION/TRAINING PROGRAM

## 2025-01-22 PROCEDURE — 2700000000 HC OXYGEN THERAPY PER DAY

## 2025-01-22 PROCEDURE — 2500000003 HC RX 250 WO HCPCS: Performed by: ANESTHESIOLOGY

## 2025-01-22 PROCEDURE — 6360000002 HC RX W HCPCS: Performed by: NURSE ANESTHETIST, CERTIFIED REGISTERED

## 2025-01-22 PROCEDURE — 85730 THROMBOPLASTIN TIME PARTIAL: CPT

## 2025-01-22 PROCEDURE — 6360000004 HC RX CONTRAST MEDICATION: Performed by: EMERGENCY MEDICINE

## 2025-01-22 PROCEDURE — 00N00ZZ RELEASE BRAIN, OPEN APPROACH: ICD-10-PCS | Performed by: STUDENT IN AN ORGANIZED HEALTH CARE EDUCATION/TRAINING PROGRAM

## 2025-01-22 PROCEDURE — 6360000002 HC RX W HCPCS: Performed by: EMERGENCY MEDICINE

## 2025-01-22 PROCEDURE — 80053 COMPREHEN METABOLIC PANEL: CPT

## 2025-01-22 PROCEDURE — 36415 COLL VENOUS BLD VENIPUNCTURE: CPT

## 2025-01-22 PROCEDURE — 85025 COMPLETE CBC W/AUTO DIFF WBC: CPT

## 2025-01-22 PROCEDURE — APPNB45 APP NON BILLABLE 31-45 MINUTES

## 2025-01-22 PROCEDURE — 71045 X-RAY EXAM CHEST 1 VIEW: CPT

## 2025-01-22 PROCEDURE — 93005 ELECTROCARDIOGRAM TRACING: CPT | Performed by: EMERGENCY MEDICINE

## 2025-01-22 PROCEDURE — 3700000000 HC ANESTHESIA ATTENDED CARE: Performed by: STUDENT IN AN ORGANIZED HEALTH CARE EDUCATION/TRAINING PROGRAM

## 2025-01-22 PROCEDURE — 009600Z DRAINAGE OF CEREBRAL VENTRICLE WITH DRAINAGE DEVICE, OPEN APPROACH: ICD-10-PCS | Performed by: STUDENT IN AN ORGANIZED HEALTH CARE EDUCATION/TRAINING PROGRAM

## 2025-01-22 PROCEDURE — 86850 RBC ANTIBODY SCREEN: CPT

## 2025-01-22 PROCEDURE — C1763 CONN TISS, NON-HUMAN: HCPCS | Performed by: STUDENT IN AN ORGANIZED HEALTH CARE EDUCATION/TRAINING PROGRAM

## 2025-01-22 PROCEDURE — 3700000001 HC ADD 15 MINUTES (ANESTHESIA): Performed by: STUDENT IN AN ORGANIZED HEALTH CARE EDUCATION/TRAINING PROGRAM

## 2025-01-22 PROCEDURE — 2580000003 HC RX 258: Performed by: NURSE PRACTITIONER

## 2025-01-22 PROCEDURE — 86901 BLOOD TYPING SEROLOGIC RH(D): CPT

## 2025-01-22 PROCEDURE — 2580000003 HC RX 258: Performed by: EMERGENCY MEDICINE

## 2025-01-22 PROCEDURE — 94761 N-INVAS EAR/PLS OXIMETRY MLT: CPT

## 2025-01-22 PROCEDURE — 2709999900 HC NON-CHARGEABLE SUPPLY: Performed by: STUDENT IN AN ORGANIZED HEALTH CARE EDUCATION/TRAINING PROGRAM

## 2025-01-22 PROCEDURE — 84484 ASSAY OF TROPONIN QUANT: CPT

## 2025-01-22 PROCEDURE — 99285 EMERGENCY DEPT VISIT HI MDM: CPT

## 2025-01-22 DEVICE — DURAGEN® PLUS DURAL REGENERATION MATRIX, 3 IN X 3 IN (7.5 CM X 7.5 CM)
Type: IMPLANTABLE DEVICE | Site: BRAIN | Status: FUNCTIONAL
Brand: DURAGEN® PLUS

## 2025-01-22 DEVICE — SCREW UN3 SLFTP 1.5X4MM: Type: IMPLANTABLE DEVICE | Site: CRANIAL | Status: FUNCTIONAL

## 2025-01-22 DEVICE — STRAIGHT PLATE
Type: IMPLANTABLE DEVICE | Site: CRANIAL | Status: FUNCTIONAL
Brand: UNIVERSAL NEURO 3

## 2025-01-22 RX ORDER — BACITRACIN ZINC AND POLYMYXIN B SULFATE 500; 10000 [USP'U]/G; [USP'U]/G
OINTMENT TOPICAL PRN
Status: DISCONTINUED | OUTPATIENT
Start: 2025-01-22 | End: 2025-01-22 | Stop reason: HOSPADM

## 2025-01-22 RX ORDER — ONDANSETRON 2 MG/ML
4 INJECTION INTRAMUSCULAR; INTRAVENOUS EVERY 6 HOURS PRN
Status: DISCONTINUED | OUTPATIENT
Start: 2025-01-22 | End: 2025-02-12 | Stop reason: HOSPADM

## 2025-01-22 RX ORDER — FENTANYL CITRATE 50 UG/ML
INJECTION, SOLUTION INTRAMUSCULAR; INTRAVENOUS
Status: DISCONTINUED | OUTPATIENT
Start: 2025-01-22 | End: 2025-01-22 | Stop reason: SDUPTHER

## 2025-01-22 RX ORDER — ONDANSETRON 2 MG/ML
INJECTION INTRAMUSCULAR; INTRAVENOUS
Status: COMPLETED
Start: 2025-01-22 | End: 2025-01-22

## 2025-01-22 RX ORDER — LABETALOL HYDROCHLORIDE 5 MG/ML
20 INJECTION, SOLUTION INTRAVENOUS ONCE
Status: DISCONTINUED | OUTPATIENT
Start: 2025-01-22 | End: 2025-01-22

## 2025-01-22 RX ORDER — NICARDIPINE HYDROCHLORIDE 0.1 MG/ML
2.5-15 INJECTION INTRAVENOUS CONTINUOUS
Status: DISCONTINUED | OUTPATIENT
Start: 2025-01-22 | End: 2025-01-23

## 2025-01-22 RX ORDER — CEFAZOLIN SODIUM 1 G/3ML
INJECTION, POWDER, FOR SOLUTION INTRAMUSCULAR; INTRAVENOUS
Status: DISCONTINUED | OUTPATIENT
Start: 2025-01-22 | End: 2025-01-22 | Stop reason: SDUPTHER

## 2025-01-22 RX ORDER — SODIUM CHLORIDE 0.9 % (FLUSH) 0.9 %
5-40 SYRINGE (ML) INJECTION EVERY 12 HOURS SCHEDULED
Status: DISCONTINUED | OUTPATIENT
Start: 2025-01-22 | End: 2025-02-12 | Stop reason: HOSPADM

## 2025-01-22 RX ORDER — SODIUM CHLORIDE, SODIUM LACTATE, POTASSIUM CHLORIDE, CALCIUM CHLORIDE 600; 310; 30; 20 MG/100ML; MG/100ML; MG/100ML; MG/100ML
INJECTION, SOLUTION INTRAVENOUS
Status: DISCONTINUED | OUTPATIENT
Start: 2025-01-22 | End: 2025-01-22 | Stop reason: SDUPTHER

## 2025-01-22 RX ORDER — ACETAMINOPHEN 650 MG/1
650 SUPPOSITORY RECTAL EVERY 6 HOURS PRN
Status: DISCONTINUED | OUTPATIENT
Start: 2025-01-22 | End: 2025-02-12 | Stop reason: HOSPADM

## 2025-01-22 RX ORDER — IOPAMIDOL 755 MG/ML
75 INJECTION, SOLUTION INTRAVASCULAR
Status: COMPLETED | OUTPATIENT
Start: 2025-01-22 | End: 2025-01-22

## 2025-01-22 RX ORDER — DEXAMETHASONE SODIUM PHOSPHATE 4 MG/ML
INJECTION, SOLUTION INTRA-ARTICULAR; INTRALESIONAL; INTRAMUSCULAR; INTRAVENOUS; SOFT TISSUE
Status: DISCONTINUED | OUTPATIENT
Start: 2025-01-22 | End: 2025-01-22 | Stop reason: SDUPTHER

## 2025-01-22 RX ORDER — ONDANSETRON 2 MG/ML
INJECTION INTRAMUSCULAR; INTRAVENOUS
Status: DISCONTINUED | OUTPATIENT
Start: 2025-01-22 | End: 2025-01-22 | Stop reason: SDUPTHER

## 2025-01-22 RX ORDER — LIDOCAINE HYDROCHLORIDE 20 MG/ML
INJECTION, SOLUTION INTRAVENOUS
Status: DISCONTINUED | OUTPATIENT
Start: 2025-01-22 | End: 2025-01-22 | Stop reason: SDUPTHER

## 2025-01-22 RX ORDER — ROCURONIUM BROMIDE 10 MG/ML
INJECTION, SOLUTION INTRAVENOUS
Status: DISCONTINUED | OUTPATIENT
Start: 2025-01-22 | End: 2025-01-22 | Stop reason: SDUPTHER

## 2025-01-22 RX ORDER — ONDANSETRON 4 MG/1
4 TABLET, ORALLY DISINTEGRATING ORAL EVERY 8 HOURS PRN
Status: DISCONTINUED | OUTPATIENT
Start: 2025-01-22 | End: 2025-02-12 | Stop reason: HOSPADM

## 2025-01-22 RX ORDER — BUPIVACAINE HYDROCHLORIDE AND EPINEPHRINE 5; 5 MG/ML; UG/ML
INJECTION, SOLUTION EPIDURAL; INTRACAUDAL; PERINEURAL PRN
Status: DISCONTINUED | OUTPATIENT
Start: 2025-01-22 | End: 2025-01-22 | Stop reason: HOSPADM

## 2025-01-22 RX ORDER — LEVETIRACETAM 500 MG/5ML
500 INJECTION, SOLUTION, CONCENTRATE INTRAVENOUS EVERY 12 HOURS
Status: DISCONTINUED | OUTPATIENT
Start: 2025-01-23 | End: 2025-01-23

## 2025-01-22 RX ORDER — ONDANSETRON 2 MG/ML
8 INJECTION INTRAMUSCULAR; INTRAVENOUS ONCE
Status: COMPLETED | OUTPATIENT
Start: 2025-01-22 | End: 2025-01-22

## 2025-01-22 RX ORDER — LABETALOL HYDROCHLORIDE 5 MG/ML
20 INJECTION, SOLUTION INTRAVENOUS ONCE
Status: COMPLETED | OUTPATIENT
Start: 2025-01-22 | End: 2025-01-22

## 2025-01-22 RX ORDER — PROPOFOL 10 MG/ML
INJECTION, EMULSION INTRAVENOUS
Status: DISCONTINUED | OUTPATIENT
Start: 2025-01-22 | End: 2025-01-22 | Stop reason: SDUPTHER

## 2025-01-22 RX ORDER — SODIUM CHLORIDE 9 MG/ML
INJECTION, SOLUTION INTRAVENOUS
Status: DISCONTINUED | OUTPATIENT
Start: 2025-01-22 | End: 2025-01-22 | Stop reason: SDUPTHER

## 2025-01-22 RX ORDER — SODIUM CHLORIDE 9 MG/ML
INJECTION, SOLUTION INTRAVENOUS PRN
Status: DISCONTINUED | OUTPATIENT
Start: 2025-01-22 | End: 2025-02-12 | Stop reason: HOSPADM

## 2025-01-22 RX ORDER — SODIUM CHLORIDE 0.9 % (FLUSH) 0.9 %
5-40 SYRINGE (ML) INJECTION PRN
Status: DISCONTINUED | OUTPATIENT
Start: 2025-01-22 | End: 2025-02-12 | Stop reason: HOSPADM

## 2025-01-22 RX ORDER — MAGNESIUM HYDROXIDE 1200 MG/15ML
LIQUID ORAL CONTINUOUS PRN
Status: COMPLETED | OUTPATIENT
Start: 2025-01-22 | End: 2025-01-22

## 2025-01-22 RX ORDER — PROPOFOL 10 MG/ML
5-50 INJECTION, EMULSION INTRAVENOUS CONTINUOUS
Status: DISCONTINUED | OUTPATIENT
Start: 2025-01-22 | End: 2025-01-25

## 2025-01-22 RX ORDER — GLYCOPYRROLATE 0.2 MG/ML
INJECTION INTRAMUSCULAR; INTRAVENOUS
Status: DISCONTINUED | OUTPATIENT
Start: 2025-01-22 | End: 2025-01-22 | Stop reason: SDUPTHER

## 2025-01-22 RX ORDER — ACETAMINOPHEN 325 MG/1
650 TABLET ORAL EVERY 6 HOURS PRN
Status: DISCONTINUED | OUTPATIENT
Start: 2025-01-22 | End: 2025-02-12 | Stop reason: HOSPADM

## 2025-01-22 RX ORDER — MANNITOL 20 G/100ML
INJECTION, SOLUTION INTRAVENOUS
Status: DISCONTINUED | OUTPATIENT
Start: 2025-01-22 | End: 2025-01-22 | Stop reason: SDUPTHER

## 2025-01-22 RX ORDER — ALENDRONATE SODIUM 70 MG/1
70 TABLET ORAL
COMMUNITY

## 2025-01-22 RX ADMIN — LABETALOL HYDROCHLORIDE 20 MG: 5 INJECTION, SOLUTION INTRAVENOUS at 11:29

## 2025-01-22 RX ADMIN — ROCURONIUM BROMIDE 30 MG: 10 INJECTION, SOLUTION INTRAVENOUS at 15:32

## 2025-01-22 RX ADMIN — ONDANSETRON 4 MG: 2 INJECTION INTRAMUSCULAR; INTRAVENOUS at 18:32

## 2025-01-22 RX ADMIN — IOPAMIDOL 75 ML: 755 INJECTION, SOLUTION INTRAVENOUS at 11:23

## 2025-01-22 RX ADMIN — ROCURONIUM BROMIDE 100 MG: 10 INJECTION, SOLUTION INTRAVENOUS at 14:38

## 2025-01-22 RX ADMIN — PROPOFOL 45 MCG/KG/MIN: 10 INJECTION, EMULSION INTRAVENOUS at 20:44

## 2025-01-22 RX ADMIN — ONDANSETRON 8 MG: 2 INJECTION INTRAMUSCULAR; INTRAVENOUS at 12:40

## 2025-01-22 RX ADMIN — ROCURONIUM BROMIDE 20 MG: 10 INJECTION, SOLUTION INTRAVENOUS at 17:49

## 2025-01-22 RX ADMIN — SODIUM CHLORIDE, PRESERVATIVE FREE 10 ML: 5 INJECTION INTRAVENOUS at 20:54

## 2025-01-22 RX ADMIN — PHENYLEPHRINE HYDROCHLORIDE 100 MCG: 10 INJECTION, SOLUTION INTRAVENOUS at 15:00

## 2025-01-22 RX ADMIN — PROPOFOL 50 MG: 10 INJECTION, EMULSION INTRAVENOUS at 14:37

## 2025-01-22 RX ADMIN — PHENYLEPHRINE HYDROCHLORIDE 100 MCG: 10 INJECTION, SOLUTION INTRAVENOUS at 16:18

## 2025-01-22 RX ADMIN — ROCURONIUM BROMIDE 30 MG: 10 INJECTION, SOLUTION INTRAVENOUS at 17:02

## 2025-01-22 RX ADMIN — SODIUM CHLORIDE, SODIUM LACTATE, POTASSIUM CHLORIDE, AND CALCIUM CHLORIDE: .6; .31; .03; .02 INJECTION, SOLUTION INTRAVENOUS at 16:32

## 2025-01-22 RX ADMIN — NICARDIPINE HYDROCHLORIDE 5 MG/HR: 0.1 INJECTION INTRAVENOUS at 20:52

## 2025-01-22 RX ADMIN — SODIUM CHLORIDE: 9 INJECTION, SOLUTION INTRAVENOUS at 14:31

## 2025-01-22 RX ADMIN — PHENYLEPHRINE HYDROCHLORIDE 100 MCG: 10 INJECTION, SOLUTION INTRAVENOUS at 15:38

## 2025-01-22 RX ADMIN — LIDOCAINE HYDROCHLORIDE 100 MG: 20 INJECTION, SOLUTION INTRAVENOUS at 14:36

## 2025-01-22 RX ADMIN — PHENYLEPHRINE HYDROCHLORIDE 40 MCG/MIN: 10 INJECTION, SOLUTION INTRAVENOUS at 14:41

## 2025-01-22 RX ADMIN — SODIUM CHLORIDE 5 MG/HR: 9 INJECTION, SOLUTION INTRAVENOUS at 11:29

## 2025-01-22 RX ADMIN — CEFAZOLIN SODIUM 2 G: 1 POWDER, FOR SOLUTION INTRAMUSCULAR; INTRAVENOUS at 14:48

## 2025-01-22 RX ADMIN — GLYCOPYRROLATE 0.2 MG: 0.2 INJECTION INTRAMUSCULAR; INTRAVENOUS at 15:30

## 2025-01-22 RX ADMIN — PHENYLEPHRINE HYDROCHLORIDE 100 MCG: 10 INJECTION, SOLUTION INTRAVENOUS at 15:22

## 2025-01-22 RX ADMIN — SUGAMMADEX 200 MG: 100 INJECTION, SOLUTION INTRAVENOUS at 19:50

## 2025-01-22 RX ADMIN — PROPOFOL 120 MCG/KG/MIN: 10 INJECTION, EMULSION INTRAVENOUS at 18:53

## 2025-01-22 RX ADMIN — MANNITOL 70 G: 20 INJECTION, SOLUTION INTRAVENOUS at 15:37

## 2025-01-22 RX ADMIN — SODIUM CHLORIDE, SODIUM LACTATE, POTASSIUM CHLORIDE, AND CALCIUM CHLORIDE: .6; .31; .03; .02 INJECTION, SOLUTION INTRAVENOUS at 14:31

## 2025-01-22 RX ADMIN — SODIUM CHLORIDE: 9 INJECTION, SOLUTION INTRAVENOUS at 18:04

## 2025-01-22 RX ADMIN — ROCURONIUM BROMIDE 20 MG: 10 INJECTION, SOLUTION INTRAVENOUS at 16:15

## 2025-01-22 RX ADMIN — DEXAMETHASONE SODIUM PHOSPHATE 8 MG: 4 INJECTION INTRA-ARTICULAR; INTRALESIONAL; INTRAMUSCULAR; INTRAVENOUS; SOFT TISSUE at 14:44

## 2025-01-22 RX ADMIN — FENTANYL CITRATE 100 MCG: 50 INJECTION, SOLUTION INTRAMUSCULAR; INTRAVENOUS at 14:36

## 2025-01-22 RX ADMIN — WATER 2000 MG: 1 INJECTION INTRAMUSCULAR; INTRAVENOUS; SUBCUTANEOUS at 23:18

## 2025-01-22 RX ADMIN — DESMOPRESSIN ACETATE 28.04 MCG: 4 SOLUTION INTRAVENOUS at 12:06

## 2025-01-22 ASSESSMENT — PULMONARY FUNCTION TESTS
PIF_VALUE: 15

## 2025-01-22 NOTE — ANESTHESIA PRE PROCEDURE
01/22/25 70.1 kg (154 lb 9.6 oz)   07/08/24 65.8 kg (145 lb)   07/06/23 68.5 kg (151 lb)     Body mass index is 27.39 kg/m².    CBC:   Lab Results   Component Value Date/Time    WBC 9.4 01/22/2025 11:09 AM    RBC 4.50 01/22/2025 11:09 AM    RBC 2.99 08/25/2017 08:50 AM    HGB 15.6 01/22/2025 11:09 AM    HCT 45.9 01/22/2025 11:09 AM    .1 01/22/2025 11:09 AM    RDW 13.3 01/22/2025 11:09 AM     01/22/2025 11:09 AM       CMP:   Lab Results   Component Value Date/Time     01/22/2025 12:35 PM    K see below 01/22/2025 12:35 PM    K see below 01/22/2025 11:09 AM     01/22/2025 12:35 PM    CO2 18 01/22/2025 12:35 PM    BUN 11 01/22/2025 12:35 PM    CREATININE 0.6 01/22/2025 12:35 PM    GFRAA 54 07/18/2017 03:25 AM    GFRAA >60 01/03/2013 02:15 PM    AGRATIO 1.3 01/22/2025 11:09 AM    LABGLOM >90 01/22/2025 12:35 PM    GLUCOSE 139 01/22/2025 12:35 PM    GLUCOSE 97 08/25/2017 08:47 AM    CALCIUM 7.6 01/22/2025 12:35 PM    BILITOT 0.9 01/22/2025 11:09 AM    ALKPHOS 84 01/22/2025 11:09 AM    AST 56 01/22/2025 11:09 AM    ALT see below 01/22/2025 11:09 AM       POC Tests:   Recent Labs     01/22/25  1107   POCGLU 145*       Coags:   Lab Results   Component Value Date/Time    PROTIME 12.7 01/22/2025 11:09 AM    INR 0.93 01/22/2025 11:09 AM    APTT 24.6 01/22/2025 12:35 PM       HCG (If Applicable): No results found for: \"PREGTESTUR\", \"PREGSERUM\", \"HCG\", \"HCGQUANT\"     ABGs: No results found for: \"PHART\", \"PO2ART\", \"RUP2FTM\", \"HJB3HQF\", \"BEART\", \"I1LLQAUU\"     Type & Screen (If Applicable):  Lab Results   Component Value Date    ABORH O POS 01/22/2025    LABANTI NEG 01/22/2025       Drug/Infectious Status (If Applicable):  No results found for: \"HIV\", \"HEPCAB\"    COVID-19 Screening (If Applicable):   Lab Results   Component Value Date/Time    COVID19 Not Detected 07/06/2020 11:11 AM           Anesthesia Evaluation  Patient summary reviewed  Airway: Mallampati: I  TM distance: >3 FB   Neck ROM:

## 2025-01-22 NOTE — ED NOTES
Patient Name: Maria Antonia Crawford  : 1945 79 y.o.  MRN: [id number]  ED Room #: 2TR/2[facility]-     Chief complaint:   Chief Complaint   Patient presents with    Extremity Weakness    Facial Droop     Patient arrived by [facility] for reports of weakness. Patient presented with facial droop     Hospital Problem/Diagnosis:   Hospital Problems             Last Modified POA    * (Principal) Intracranial hemorrhage (HCC) 2025 Yes         O2 Flow Rate:    (if applicable)  Cardiac Rhythm:   (if applicable)  Active LDA's:   Peripheral IV 25 Posterior;Right Forearm (Active)   Site Assessment Clean, dry & intact 25 1115   Line Status Blood return noted;Flushed 25 1115       Peripheral IV 25 Left Forearm (Active)            How does patient ambulate? Unknown, did not assess in the Emergency Department    2. How does patient take pills? Unknown, no oral medications were given in the Emergency Department    3. Is patient alert? Drowsy, but responds to voice    4. Is patient oriented? Follows Commands    5.   Patient arrived from:  home  Facility Name: ___________________________________________    6. If patient is disoriented or from a Skill Nursing Facility has family been notified of admission? No    7. Patient belongings? Belongings:      Disposition of belongings? No Belongings     8. Any specific patient or family belongings/needs/dynamics?   a. Family bedside    9. Miscellaneous comments/pending orders?  a. No ED Orders      If there are any additional questions please reach out to the Emergency Department.   -Patient is a 79 year old female who presented to the ED with a chief complaint of weakness and facial droop. Patient last known well was around 3056-1951 this morning. Presented with left sided facial droop, drowsy, but followed commands. Code stroke activated and patient sent to the scanner that showed a hemorrhage. Patient has bilateral 20 gauge Ivs in the forearms. Patient initial

## 2025-01-22 NOTE — BRIEF OP NOTE
Brief Postoperative Note      Patient: Maria Antonia Crawford  YOB: 1945  MRN: [id number]    Date of Procedure: 1/22/2025    Pre-Op Diagnosis Codes:      * Hematoma [T14.8XXA]    Post-Op Diagnosis:  cerebellar intracerebral hemorrhage       Procedure(s):  RIGHT FRONTAL EXTRAVENTRICULAR DRAIN/ SUBOCCIPITAL DECOMPRESSION AND HEMATOMA EVACUATION    Surgeon(s):  Scar Kay MD    Assistant:  Surgical Assistant: Jim Wallace    Anesthesia: General    Estimated Blood Loss (mL): 500    Complications: None    Specimens:   ID Type Source Tests Collected by Time Destination   A : CEREBELLAR HEMATOMA Tissue Tissue SURGICAL PATHOLOGY Scar Kay MD 1/22/2025 3135        Implants:  Implant Name Type Inv. Item Serial No.  Lot No. LRB No. Used Action   GRAFT DURA [id number] ULTRAPURE CLLGN ADH GARCIA MTRX DURAGN + - [id number]  GRAFT DURA [id number] ULTRAPURE CLLGN ADH GARCIA MTRX DURAGN +  [employer]-  N/A 1 Implanted         Drains:   ICP monitor (Active)       Urinary Catheter 01/22/25 2 Way;Yoo-Temperature (Active)       Findings:  Infection Present At Time Of Surgery (PATOS) (choose all levels that have infection present):  No infection present  Other Findings:     -placement of right frontal EVD; suboccipital craniectomy and c1 laminectomy and cerebellar hematoma partial evacuation. Suboccipital incision skin closed with nylon suture.     Electronically signed by Scar Kay MD on 1/22/2025 at 6:42 PM

## 2025-01-22 NOTE — H&P
MDS  -S/p remote history of bone marrow transplant    Glycemic goal:  140-180 mg/dL  LDAs:  PIV x2, a-line, urinary catheter  Infusions: no continuous infusions  Abx: N/A   Diet:  Diet NPO  GI PPx:  Pepcid 20 mg IV bid  Bowel Regimen:  Senekot  DVT PPx: SCDs      Code Status:  Prior  Disposition:   Prior to admission:  From Home  Current:  ICU  At discharge:  LORENA Cole DO, PGY-1  Internal Medicine Resident  Contact via Ahead

## 2025-01-22 NOTE — ED PROVIDER NOTES
*Care was provided during a period of multiple hospital medication shortages, including a nationwide IV fluid shortage due to natural disasters*    THE [facility]  EMERGENCY DEPARTMENT ENCOUNTER          ATTENDING PHYSICIAN NOTE       Date of evaluation: 1/22/2025    Chief Complaint     Extremity Weakness and Facial Droop (Patient arrived by [facility] for reports of weakness. Patient presented with facial droop)      History of Present Illness     Maria Antonia Crawford is a 79 y.o. female who presents to the emergency department today for altered mental status, extremity weakness, possible stroke.  Patient was last seen well at 09 100 when she began to complain acutely of a headache and had nausea and vomiting.  She then collapsed to the floor and 911 was called by her family.  Patient cannot provide any detailed history at this time.  Per EMS she was hypertensive in the 220s systolic, glucose was not obtained.  They were concerned about the possibility of left facial droop and some generalized bilateral extremity weakness and called with concern for stroke.  She reportedly has a history of MDS and low platelets.    Physical Exam     INITIAL VITALS: BP: (!) 218/109,  , Pulse: 63, Respirations: 20,       Physical Exam    Nursing note and vitals reviewed.    General:  Adult female, obtunded, awakens briefly to loud verbal or tactile stimulus.    HENT: Normocephalic and atraumatic. External ears normal. Nose appears normal externally.  No mishandling of secretions or gurgling.  Eyes: Conjunctivae normal. No scleral icterus. PERRL, EOMI, no gaze preference or deviation, no nystagmus.  Neck: Neck supple. No tracheal deviation present.   CV: Normal rate. Regular rhythm. S1/S2 auscultated. No murmurs, gallops or rubs.   Pulm: Effort normal on RA. Breath sounds clear to auscultation bilaterally. No wheezes. No rales or rhonchi.   Musculoskeletal: No edema. No gross deformities.  Neurological: Patient obtunded, does

## 2025-01-22 NOTE — DISCHARGE INSTRUCTIONS
[facility] Stroke Program Survey  The [facility] values your feedback related to your recent hospital visit and admission. We strive to improve our Neuroscience program to promote better outcomes and recoveries for all our patients.  The anonymous survey below consists of a few questions that are related to your stay and around your Stroke diagnosis, treatment, and recovery. It is anonymous and has only a few questions.  The estimated length of time needed to complete this survey is 3 minutes or less. Thank you for completing this survey!

## 2025-01-23 ENCOUNTER — APPOINTMENT (OUTPATIENT)
Dept: GENERAL RADIOLOGY | Age: 80
DRG: 003 | End: 2025-01-23
Payer: MEDICARE

## 2025-01-23 PROBLEM — J96.90 RESPIRATORY FAILURE REQUIRING INTUBATION: Status: ACTIVE | Noted: 2025-01-23

## 2025-01-23 LAB
ANION GAP SERPL CALCULATED.3IONS-SCNC: 15 MMOL/L (ref 3–16)
BASE EXCESS BLDA CALC-SCNC: -2 MMOL/L (ref -3–3)
BASE EXCESS BLDA CALC-SCNC: 0 MMOL/L (ref -3–3)
BUN SERPL-MCNC: 13 MG/DL (ref 7–20)
CALCIUM SERPL-MCNC: 8.5 MG/DL (ref 8.3–10.6)
CHLORIDE SERPL-SCNC: 104 MMOL/L (ref 99–110)
CHOLEST SERPL-MCNC: 187 MG/DL (ref 0–199)
CO2 BLDA-SCNC: 23 MMOL/L
CO2 BLDA-SCNC: 26 MMOL/L
CO2 SERPL-SCNC: 20 MMOL/L (ref 21–32)
CREAT SERPL-MCNC: 0.8 MG/DL (ref 0.6–1.2)
DEPRECATED RDW RBC AUTO: 13 % (ref 12.4–15.4)
EST. AVERAGE GLUCOSE BLD GHB EST-MCNC: 85.3 MG/DL
GFR SERPLBLD CREATININE-BSD FMLA CKD-EPI: 76 ML/MIN/{1.73_M2}
GLUCOSE BLD-MCNC: 146 MG/DL (ref 70–99)
GLUCOSE BLD-MCNC: 148 MG/DL (ref 70–99)
GLUCOSE SERPL-MCNC: 205 MG/DL (ref 70–99)
HBA1C MFR BLD: 4.6 %
HCO3 BLDA-SCNC: 22 MMOL/L (ref 21–29)
HCO3 BLDA-SCNC: 24.7 MMOL/L (ref 21–29)
HCT VFR BLD AUTO: 36.6 % (ref 36–48)
HDLC SERPL-MCNC: 69 MG/DL (ref 40–60)
HGB BLD-MCNC: 12.7 G/DL (ref 12–16)
LDLC SERPL CALC-MCNC: 94 MG/DL
MAGNESIUM SERPL-MCNC: 1.75 MG/DL (ref 1.8–2.4)
MCH RBC QN AUTO: 34.9 PG (ref 26–34)
MCHC RBC AUTO-ENTMCNC: 34.6 G/DL (ref 31–36)
MCV RBC AUTO: 100.8 FL (ref 80–100)
PCO2 BLDA: 33.5 MM HG (ref 35–45)
PCO2 BLDA: 39.5 MM HG (ref 35–45)
PERFORMED ON: ABNORMAL
PERFORMED ON: ABNORMAL
PH BLDA: 7.4 [PH] (ref 7.35–7.45)
PH BLDA: 7.42 [PH] (ref 7.35–7.45)
PLATELET # BLD AUTO: 211 K/UL (ref 135–450)
PMV BLD AUTO: 7 FL (ref 5–10.5)
PO2 BLDA: 77.4 MM HG (ref 75–108)
PO2 BLDA: 91.4 MM HG (ref 75–108)
POC SAMPLE TYPE: ABNORMAL
POC SAMPLE TYPE: ABNORMAL
POTASSIUM SERPL-SCNC: 3.6 MMOL/L (ref 3.5–5.1)
RBC # BLD AUTO: 3.63 M/UL (ref 4–5.2)
SAO2 % BLDA: 95 % (ref 93–100)
SAO2 % BLDA: 97 % (ref 93–100)
SODIUM SERPL-SCNC: 139 MMOL/L (ref 136–145)
TRIGL SERPL-MCNC: 122 MG/DL (ref 0–150)
VLDLC SERPL CALC-MCNC: 24 MG/DL
WBC # BLD AUTO: 10.2 K/UL (ref 4–11)

## 2025-01-23 PROCEDURE — 99222 1ST HOSP IP/OBS MODERATE 55: CPT | Performed by: INTERNAL MEDICINE

## 2025-01-23 PROCEDURE — 2700000000 HC OXYGEN THERAPY PER DAY

## 2025-01-23 PROCEDURE — 83036 HEMOGLOBIN GLYCOSYLATED A1C: CPT

## 2025-01-23 PROCEDURE — 6360000002 HC RX W HCPCS

## 2025-01-23 PROCEDURE — 2000000000 HC ICU R&B

## 2025-01-23 PROCEDURE — 99232 SBSQ HOSP IP/OBS MODERATE 35: CPT

## 2025-01-23 PROCEDURE — 2500000003 HC RX 250 WO HCPCS: Performed by: STUDENT IN AN ORGANIZED HEALTH CARE EDUCATION/TRAINING PROGRAM

## 2025-01-23 PROCEDURE — 6360000002 HC RX W HCPCS: Performed by: STUDENT IN AN ORGANIZED HEALTH CARE EDUCATION/TRAINING PROGRAM

## 2025-01-23 PROCEDURE — 82947 ASSAY GLUCOSE BLOOD QUANT: CPT

## 2025-01-23 PROCEDURE — 80061 LIPID PANEL: CPT

## 2025-01-23 PROCEDURE — 6370000000 HC RX 637 (ALT 250 FOR IP)

## 2025-01-23 PROCEDURE — 80048 BASIC METABOLIC PNL TOTAL CA: CPT

## 2025-01-23 PROCEDURE — 94003 VENT MGMT INPAT SUBQ DAY: CPT

## 2025-01-23 PROCEDURE — 85027 COMPLETE CBC AUTOMATED: CPT

## 2025-01-23 PROCEDURE — 94761 N-INVAS EAR/PLS OXIMETRY MLT: CPT

## 2025-01-23 PROCEDURE — 74018 RADEX ABDOMEN 1 VIEW: CPT

## 2025-01-23 PROCEDURE — 2500000003 HC RX 250 WO HCPCS

## 2025-01-23 PROCEDURE — 37799 UNLISTED PX VASCULAR SURGERY: CPT

## 2025-01-23 PROCEDURE — 2580000003 HC RX 258

## 2025-01-23 PROCEDURE — 82803 BLOOD GASES ANY COMBINATION: CPT

## 2025-01-23 PROCEDURE — 83735 ASSAY OF MAGNESIUM: CPT

## 2025-01-23 RX ORDER — CARVEDILOL 6.25 MG/1
6.25 TABLET ORAL 2 TIMES DAILY
Status: DISCONTINUED | OUTPATIENT
Start: 2025-01-23 | End: 2025-01-26

## 2025-01-23 RX ORDER — MAGNESIUM SULFATE IN WATER 40 MG/ML
2000 INJECTION, SOLUTION INTRAVENOUS ONCE
Status: COMPLETED | OUTPATIENT
Start: 2025-01-23 | End: 2025-01-23

## 2025-01-23 RX ORDER — SODIUM CHLORIDE 9 MG/ML
INJECTION, SOLUTION INTRAVENOUS CONTINUOUS
Status: ACTIVE | OUTPATIENT
Start: 2025-01-23 | End: 2025-01-24

## 2025-01-23 RX ORDER — NICARDIPINE HYDROCHLORIDE 0.1 MG/ML
2.5-15 INJECTION INTRAVENOUS CONTINUOUS
Status: DISCONTINUED | OUTPATIENT
Start: 2025-01-23 | End: 2025-02-01

## 2025-01-23 RX ORDER — LANSOPRAZOLE 30 MG/1
30 TABLET, ORALLY DISINTEGRATING, DELAYED RELEASE ORAL DAILY
Status: DISCONTINUED | OUTPATIENT
Start: 2025-01-23 | End: 2025-01-27 | Stop reason: ALTCHOICE

## 2025-01-23 RX ORDER — LABETALOL HYDROCHLORIDE 5 MG/ML
10 INJECTION, SOLUTION INTRAVENOUS EVERY 6 HOURS PRN
Status: DISCONTINUED | OUTPATIENT
Start: 2025-01-23 | End: 2025-01-23

## 2025-01-23 RX ADMIN — CARVEDILOL 6.25 MG: 6.25 TABLET, FILM COATED ORAL at 14:00

## 2025-01-23 RX ADMIN — WATER 2000 MG: 1 INJECTION INTRAMUSCULAR; INTRAVENOUS; SUBCUTANEOUS at 08:10

## 2025-01-23 RX ADMIN — NICARDIPINE HYDROCHLORIDE 5 MG/HR: 0.1 INJECTION INTRAVENOUS at 19:41

## 2025-01-23 RX ADMIN — NICARDIPINE HYDROCHLORIDE 5 MG/HR: 0.1 INJECTION INTRAVENOUS at 14:44

## 2025-01-23 RX ADMIN — SODIUM CHLORIDE: 9 INJECTION, SOLUTION INTRAVENOUS at 13:51

## 2025-01-23 RX ADMIN — CARVEDILOL 6.25 MG: 6.25 TABLET, FILM COATED ORAL at 20:50

## 2025-01-23 RX ADMIN — LEVETIRACETAM 500 MG: 100 INJECTION INTRAVENOUS at 08:10

## 2025-01-23 RX ADMIN — SODIUM CHLORIDE, PRESERVATIVE FREE 10 ML: 5 INJECTION INTRAVENOUS at 14:00

## 2025-01-23 RX ADMIN — PROPOFOL 20 MCG/KG/MIN: 10 INJECTION, EMULSION INTRAVENOUS at 06:06

## 2025-01-23 RX ADMIN — NICARDIPINE HYDROCHLORIDE 2.5 MG/HR: 0.1 INJECTION INTRAVENOUS at 01:56

## 2025-01-23 RX ADMIN — NICARDIPINE HYDROCHLORIDE 7.5 MG/HR: 0.1 INJECTION INTRAVENOUS at 08:27

## 2025-01-23 RX ADMIN — SODIUM CHLORIDE, PRESERVATIVE FREE 10 ML: 5 INJECTION INTRAVENOUS at 20:50

## 2025-01-23 RX ADMIN — LANSOPRAZOLE 30 MG: 30 TABLET, ORALLY DISINTEGRATING, DELAYED RELEASE ORAL at 14:00

## 2025-01-23 RX ADMIN — WATER 2000 MG: 1 INJECTION INTRAMUSCULAR; INTRAVENOUS; SUBCUTANEOUS at 16:02

## 2025-01-23 RX ADMIN — NICARDIPINE HYDROCHLORIDE 7.5 MG/HR: 0.1 INJECTION INTRAVENOUS at 05:38

## 2025-01-23 RX ADMIN — MAGNESIUM SULFATE HEPTAHYDRATE 2000 MG: 40 INJECTION, SOLUTION INTRAVENOUS at 05:19

## 2025-01-23 ASSESSMENT — PULMONARY FUNCTION TESTS
PIF_VALUE: 15
PIF_VALUE: 13
PIF_VALUE: 15
PIF_VALUE: 13
PIF_VALUE: 15
PIF_VALUE: 13
PIF_VALUE: 15
PIF_VALUE: 13
PIF_VALUE: 15

## 2025-01-23 ASSESSMENT — PAIN SCALES - GENERAL: PAINLEVEL_OUTOF10: 0

## 2025-01-23 NOTE — CARE COORDINATION
Case Management Assessment  Initial Evaluation    Date/Time of Evaluation: 1/23/2025 4:36 PM  Assessment Completed by: Natalie Keane RN    If patient is discharged prior to next notation, then this note serves as note for discharge by case management.    Patient Name: Maria Antonia Crawford                   YOB: 1945  Diagnosis: Intracranial hemorrhage (HCC) [I62.9]                   Date / Time: 1/22/2025 11:03 AM    Patient Admission Status: Inpatient   Readmission Risk (Low < 19, Mod (19-27), High > 27): Readmission Risk Score: 12.5    Current PCP: Andrea Uriostegui MD  PCP verified by CM? Yes    Chart Reviewed: Yes      History Provided by: Spouse  Patient Orientation: Alert and Oriented, Other (see comment) (sleeping)    Patient Cognition: Alert (sleepy)    Hospitalization in the last 30 days (Readmission):  No    If yes, Readmission Assessment in  Navigator will be completed.    Advance Directives:      Code Status: Full Code   Patient's Primary Decision Maker is: Legal Next of Kin      Discharge Planning:    Patient lives with: Spouse/Significant Other Type of Home: House  Primary Care Giver: Self  Patient Support Systems include: Spouse/Significant Other, Children   Current Financial resources: Medicare  Current community resources: None  Current services prior to admission: None            Current DME:              Type of Home Care services:  None    ADLS  Prior functional level: Independent in ADLs/IADLs  Current functional level: Assistance with the following:, Bathing, Dressing, Toileting, Feeding, Cooking, Housework, Shopping, Mobility    PT AM-PAC:   /24  OT AM-PAC:   /24    Family can provide assistance at DC: Yes  Would you like Case Management to discuss the discharge plan with any other family members/significant others, and if so, who? Yes (, Andrea)  Plans to Return to Present Housing: Unknown at present  Other Identified Issues/Barriers to RETURNING to current housing: may need

## 2025-01-23 NOTE — ANESTHESIA POSTPROCEDURE EVALUATION
Department of Anesthesiology  Postprocedure Note    Patient: Maria Antonia Crawford  MRN: [id number]  YOB: 1945  Date of evaluation: 1/22/2025    Procedure Summary       Date: 01/22/25 Room / Location: [place] [id number] / [facility]    Anesthesia Start: 1431 Anesthesia Stop: 1959    Procedure: RIGHT FRONTAL EXTRAVENTRICULAR DRAIN/ SUBOCCIPITAL DECOMPRESSION AND HEMATOMA EVACUATION (Right) Diagnosis:       Hematoma      (Hematoma [T14.8XXA])    Surgeons: Scar Kay MD Responsible Provider: Mickey Atkinson DO    Anesthesia Type: general, TIVA ASA Status: 4 - Emergent            Anesthesia Type: No value filed.    Bita Phase I:      Bita Phase II:      Vitals:    01/22/25 1401   BP: 139/75   Pulse: 69   Resp: 19       Anesthesia Post Evaluation    Patient location during evaluation: ICU  Patient participation: complete - patient cannot participate  Level of consciousness: sedated and ventilated  Airway patency: patent  Nausea & Vomiting: no vomiting  Cardiovascular status: hemodynamically stable  Respiratory status: ventilator  Hydration status: euvolemic  Comments: Maria Antonia Crawford is unwell, with a guarded prognosis given pathology that required surgical intervention today. Hemodynamically stable, but unsure of post-operative neurologic status as she is currently intubated and sedated in the ICU.    No notable events documented.

## 2025-01-23 NOTE — NURSE NAVIGATOR
Patient unavailable at this time for individual Stroke education, currently  intubated.  No family at bedside currently.     Verified educational Stroke booklet in room for patient and/or family to review. Patient's personal risk factors specific to stroke/TIA include: Hypertension, hyperlipidemia, overweight (BMI 27.61).     Patient's chart reviewed for Stroke Core Measures and additional needs:    [x]   VTE prophylaxis   []   Antithrombotic (if applicable) - N/A ICH   []   Swallow screen prior to PO intake   [x]   Lipids / A1C ordered or resulted - in process   []   Therapy ordered - not indicated at this time 2/2 intubated, EVD, bedrest   [x]   Care plan and Education template    Patient seen in NCC rounds with Dr Kilpatrick, NP team, navigators, bedside RNs - Royce.    - BP goal < 160  - Neuro team with okay with extubation, MUST have RSI if reintubated per Dr Kilpatrick  - MRI ordered  - NG placement ordered    Navigator to continue to follow patient while admitted, to assist with follow up and discharge planning as needed.     Nurse eSignature: Electronically signed by Hailee Lucas RN on 1/23/25 at 10:46 AM EST - Neuroscience Navigator

## 2025-01-23 NOTE — OP NOTE
Operative Note      Patient: Maria Antonia Crawford  YOB: 1945  MRN: [id number]    Date of Procedure: 1/22/2025    Pre-Op Diagnosis Codes:      * Hematoma [T14.8XXA]    Post-Op Diagnosis:  cerebellar hemorrhage       Procedure(s):  RIGHT FRONTAL EXTRAVENTRICULAR DRAIN/ SUBOCCIPITAL DECOMPRESSION AND HEMATOMA EVACUATION    Surgeon(s):  Scar Kay MD    Assistant:   Surgical Assistant: Jim Wallace    Anesthesia: General    Estimated Blood Loss (mL): 500    Complications: None    Specimens:   ID Type Source Tests Collected by Time Destination   A : CEREBELLAR HEMATOMA Tissue Tissue SURGICAL PATHOLOGY Scar Kay MD 1/22/2025 6991        Implants:  Implant Name Type Inv. Item Serial No.  Lot No. LRB No. Used Action   GRAFT DURA [id number] ULTRAPURE CLLGN ADH GARCIA MTRX DURAGN + - [id number]  GRAFT DURA [id number] ULTRAPURE CLLGN ADH GARCIA MTRX DURAGN +  [employer]WD  N/A 1 Implanted         Drains:   ICP monitor (Active)   Level 5 cm 01/22/25 2100   Status Open to Drainage 01/22/25 2100   Dressing Status Old drainage noted 01/22/25 2100   Dressing Sterile Occlusive 01/22/25 2100   Site Assessment Bloody Drainage 01/22/25 2100   Drainage Bloody 01/22/25 2100   Output Amt 8 ml 01/22/25 2100       Urinary Catheter 01/22/25 2 Way;Yoo-Temperature (Active)       Findings:  Infection Present At Time Of Surgery (PATOS) (choose all levels that have infection present):  No infection present      Date of Procedure: 1/22/25      Pre-op Diagnosis: cerebellar hemorrhage    Post-op Diagnosis: same    Procedure:   Placement of external ventricular drain  Posterior fossa craniectomy and C1 laminectomy  Evacuation of cerebellar intraparenchymal hematoma  Microscopic technique with operating microscope  Expansile duraplasty       Attending: Scar Kay MD, PhD  Assistant:     Anesthesia: General    EBL: 500 mL    Tubes / Drains: EVD    Specimens: hematoma sent for permanent

## 2025-01-23 NOTE — CARE COORDINATION
CM attempted to meet with pt's family. None at bedside at this time. CM will attempt later.    Pt currently intubated, sedated.

## 2025-01-24 ENCOUNTER — APPOINTMENT (OUTPATIENT)
Dept: CT IMAGING | Age: 80
DRG: 003 | End: 2025-01-24
Payer: MEDICARE

## 2025-01-24 ENCOUNTER — APPOINTMENT (OUTPATIENT)
Dept: GENERAL RADIOLOGY | Age: 80
DRG: 003 | End: 2025-01-24
Payer: MEDICARE

## 2025-01-24 PROBLEM — J22 LOWER RESPIRATORY INFECTION: Status: ACTIVE | Noted: 2025-01-24

## 2025-01-24 LAB
ANION GAP SERPL CALCULATED.3IONS-SCNC: 11 MMOL/L (ref 3–16)
ANION GAP SERPL CALCULATED.3IONS-SCNC: 12 MMOL/L (ref 3–16)
BASE EXCESS BLDA CALC-SCNC: 1.5 MMOL/L (ref -3–3)
BASE EXCESS BLDA CALC-SCNC: 4 MMOL/L (ref -3–3)
BUN SERPL-MCNC: 14 MG/DL (ref 7–20)
BUN SERPL-MCNC: 15 MG/DL (ref 7–20)
CALCIUM SERPL-MCNC: 8.5 MG/DL (ref 8.3–10.6)
CALCIUM SERPL-MCNC: 8.7 MG/DL (ref 8.3–10.6)
CHLORIDE SERPL-SCNC: 103 MMOL/L (ref 99–110)
CHLORIDE SERPL-SCNC: 106 MMOL/L (ref 99–110)
CO2 BLDA-SCNC: 27 MMOL/L
CO2 BLDA-SCNC: 28 MMOL/L
CO2 SERPL-SCNC: 22 MMOL/L (ref 21–32)
CO2 SERPL-SCNC: 22 MMOL/L (ref 21–32)
COHGB MFR BLDA: 1.8 % (ref 0–1.5)
CREAT SERPL-MCNC: 0.6 MG/DL (ref 0.6–1.2)
CREAT SERPL-MCNC: 0.6 MG/DL (ref 0.6–1.2)
DEPRECATED RDW RBC AUTO: 13.2 % (ref 12.4–15.4)
GFR SERPLBLD CREATININE-BSD FMLA CKD-EPI: >90 ML/MIN/{1.73_M2}
GFR SERPLBLD CREATININE-BSD FMLA CKD-EPI: >90 ML/MIN/{1.73_M2}
GLUCOSE SERPL-MCNC: 115 MG/DL (ref 70–99)
GLUCOSE SERPL-MCNC: 148 MG/DL (ref 70–99)
HCO3 BLDA-SCNC: 26 MMOL/L (ref 21–29)
HCO3 BLDA-SCNC: 27 MMOL/L (ref 21–29)
HCT VFR BLD AUTO: 35.4 % (ref 36–48)
HGB BLD-MCNC: 12.1 G/DL (ref 12–16)
HGB BLDA-MCNC: 12.4 G/DL
MAGNESIUM SERPL-MCNC: 2.17 MG/DL (ref 1.8–2.4)
MCH RBC QN AUTO: 34.9 PG (ref 26–34)
MCHC RBC AUTO-ENTMCNC: 34.2 G/DL (ref 31–36)
MCV RBC AUTO: 101.9 FL (ref 80–100)
METHGB MFR BLDA: 0.2 % (ref 0–1.4)
PCO2 BLDA: 32.7 MM HG (ref 35–45)
PCO2 BLDA: 37.7 MMHG (ref 35–45)
PERFORMED ON: ABNORMAL
PH BLDA: 7.44 [PH] (ref 7.35–7.45)
PH BLDA: 7.52 [PH] (ref 7.35–7.45)
PHOSPHATE SERPL-MCNC: 1.3 MG/DL (ref 2.5–4.9)
PLATELET # BLD AUTO: 230 K/UL (ref 135–450)
PMV BLD AUTO: 7.3 FL (ref 5–10.5)
PO2 BLDA: 54.8 MM HG (ref 75–108)
PO2 BLDA: 70 MMHG (ref 75–108)
POC SAMPLE TYPE: ABNORMAL
POTASSIUM SERPL-SCNC: 3 MMOL/L (ref 3.5–5.1)
POTASSIUM SERPL-SCNC: 3.4 MMOL/L (ref 3.5–5.1)
RBC # BLD AUTO: 3.48 M/UL (ref 4–5.2)
SAO2 % BLDA: 92 % (ref 93–100)
SAO2 % BLDA: 96 % (ref 93–100)
SODIUM SERPL-SCNC: 137 MMOL/L (ref 136–145)
SODIUM SERPL-SCNC: 139 MMOL/L (ref 136–145)
WBC # BLD AUTO: 15.2 K/UL (ref 4–11)

## 2025-01-24 PROCEDURE — 36415 COLL VENOUS BLD VENIPUNCTURE: CPT

## 2025-01-24 PROCEDURE — 6370000000 HC RX 637 (ALT 250 FOR IP)

## 2025-01-24 PROCEDURE — 99233 SBSQ HOSP IP/OBS HIGH 50: CPT

## 2025-01-24 PROCEDURE — 74018 RADEX ABDOMEN 1 VIEW: CPT

## 2025-01-24 PROCEDURE — 2000000000 HC ICU R&B

## 2025-01-24 PROCEDURE — 84100 ASSAY OF PHOSPHORUS: CPT

## 2025-01-24 PROCEDURE — 87633 RESP VIRUS 12-25 TARGETS: CPT

## 2025-01-24 PROCEDURE — 70450 CT HEAD/BRAIN W/O DYE: CPT

## 2025-01-24 PROCEDURE — 31500 INSERT EMERGENCY AIRWAY: CPT

## 2025-01-24 PROCEDURE — 85027 COMPLETE CBC AUTOMATED: CPT

## 2025-01-24 PROCEDURE — 83735 ASSAY OF MAGNESIUM: CPT

## 2025-01-24 PROCEDURE — 6360000002 HC RX W HCPCS

## 2025-01-24 PROCEDURE — 71045 X-RAY EXAM CHEST 1 VIEW: CPT

## 2025-01-24 PROCEDURE — 94761 N-INVAS EAR/PLS OXIMETRY MLT: CPT

## 2025-01-24 PROCEDURE — 94003 VENT MGMT INPAT SUBQ DAY: CPT

## 2025-01-24 PROCEDURE — 80048 BASIC METABOLIC PNL TOTAL CA: CPT

## 2025-01-24 PROCEDURE — 2580000003 HC RX 258

## 2025-01-24 PROCEDURE — 6360000002 HC RX W HCPCS: Performed by: NURSE PRACTITIONER

## 2025-01-24 PROCEDURE — 2700000000 HC OXYGEN THERAPY PER DAY

## 2025-01-24 PROCEDURE — 2500000003 HC RX 250 WO HCPCS

## 2025-01-24 PROCEDURE — 82803 BLOOD GASES ANY COMBINATION: CPT

## 2025-01-24 PROCEDURE — 99291 CRITICAL CARE FIRST HOUR: CPT | Performed by: INTERNAL MEDICINE

## 2025-01-24 RX ORDER — OXYCODONE HYDROCHLORIDE 5 MG/1
5 TABLET ORAL EVERY 4 HOURS PRN
Status: DISCONTINUED | OUTPATIENT
Start: 2025-01-24 | End: 2025-02-12 | Stop reason: HOSPADM

## 2025-01-24 RX ORDER — MORPHINE SULFATE 2 MG/ML
2 INJECTION, SOLUTION INTRAMUSCULAR; INTRAVENOUS
Status: DISCONTINUED | OUTPATIENT
Start: 2025-01-24 | End: 2025-02-12 | Stop reason: HOSPADM

## 2025-01-24 RX ORDER — ROCURONIUM BROMIDE 10 MG/ML
70 INJECTION, SOLUTION INTRAVENOUS ONCE
Status: COMPLETED | OUTPATIENT
Start: 2025-01-24 | End: 2025-01-24

## 2025-01-24 RX ORDER — PROPOFOL 10 MG/ML
5-50 INJECTION, EMULSION INTRAVENOUS CONTINUOUS
Status: DISCONTINUED | OUTPATIENT
Start: 2025-01-24 | End: 2025-01-28 | Stop reason: ALTCHOICE

## 2025-01-24 RX ORDER — ROCURONIUM BROMIDE 10 MG/ML
INJECTION, SOLUTION INTRAVENOUS
Status: DISPENSED
Start: 2025-01-24 | End: 2025-01-24

## 2025-01-24 RX ORDER — HEPARIN SODIUM 5000 [USP'U]/ML
5000 INJECTION, SOLUTION INTRAVENOUS; SUBCUTANEOUS EVERY 8 HOURS SCHEDULED
Status: DISCONTINUED | OUTPATIENT
Start: 2025-01-24 | End: 2025-02-10

## 2025-01-24 RX ORDER — ETOMIDATE 2 MG/ML
INJECTION INTRAVENOUS
Status: DISPENSED
Start: 2025-01-24 | End: 2025-01-24

## 2025-01-24 RX ORDER — ETOMIDATE 2 MG/ML
20 INJECTION INTRAVENOUS ONCE
Status: DISCONTINUED | OUTPATIENT
Start: 2025-01-24 | End: 2025-01-27 | Stop reason: ALTCHOICE

## 2025-01-24 RX ORDER — METHOCARBAMOL 100 MG/ML
1000 INJECTION, SOLUTION INTRAMUSCULAR; INTRAVENOUS EVERY 8 HOURS
Status: COMPLETED | OUTPATIENT
Start: 2025-01-24 | End: 2025-01-27

## 2025-01-24 RX ORDER — ETOMIDATE 2 MG/ML
0.3 INJECTION INTRAVENOUS ONCE
Status: COMPLETED | OUTPATIENT
Start: 2025-01-24 | End: 2025-01-24

## 2025-01-24 RX ORDER — MORPHINE SULFATE 4 MG/ML
4 INJECTION INTRAVENOUS
Status: DISCONTINUED | OUTPATIENT
Start: 2025-01-24 | End: 2025-02-12 | Stop reason: HOSPADM

## 2025-01-24 RX ORDER — OXYCODONE HYDROCHLORIDE 5 MG/1
10 TABLET ORAL EVERY 4 HOURS PRN
Status: DISCONTINUED | OUTPATIENT
Start: 2025-01-24 | End: 2025-02-12 | Stop reason: HOSPADM

## 2025-01-24 RX ORDER — PROPOFOL 10 MG/ML
INJECTION, EMULSION INTRAVENOUS
Status: DISPENSED
Start: 2025-01-24 | End: 2025-01-24

## 2025-01-24 RX ORDER — LABETALOL HYDROCHLORIDE 5 MG/ML
10 INJECTION, SOLUTION INTRAVENOUS EVERY 4 HOURS PRN
Status: DISCONTINUED | OUTPATIENT
Start: 2025-01-24 | End: 2025-02-10

## 2025-01-24 RX ORDER — ROCURONIUM BROMIDE 10 MG/ML
1 INJECTION, SOLUTION INTRAVENOUS ONCE
Status: COMPLETED | OUTPATIENT
Start: 2025-01-24 | End: 2025-01-24

## 2025-01-24 RX ADMIN — HEPARIN SODIUM 5000 UNITS: 5000 INJECTION INTRAVENOUS; SUBCUTANEOUS at 22:14

## 2025-01-24 RX ADMIN — ETOMIDATE 21.4 MG: 2 INJECTION, SOLUTION INTRAVENOUS at 06:33

## 2025-01-24 RX ADMIN — NICARDIPINE HYDROCHLORIDE 2.5 MG/HR: 0.1 INJECTION INTRAVENOUS at 12:49

## 2025-01-24 RX ADMIN — METHOCARBAMOL 1000 MG: 100 INJECTION INTRAMUSCULAR; INTRAVENOUS at 10:34

## 2025-01-24 RX ADMIN — LABETALOL HYDROCHLORIDE 10 MG: 5 INJECTION, SOLUTION INTRAVENOUS at 02:37

## 2025-01-24 RX ADMIN — CARVEDILOL 6.25 MG: 6.25 TABLET, FILM COATED ORAL at 08:55

## 2025-01-24 RX ADMIN — NICARDIPINE HYDROCHLORIDE 2.5 MG/HR: 0.1 INJECTION INTRAVENOUS at 02:58

## 2025-01-24 RX ADMIN — METHOCARBAMOL 1000 MG: 100 INJECTION INTRAMUSCULAR; INTRAVENOUS at 18:30

## 2025-01-24 RX ADMIN — SODIUM CHLORIDE, PRESERVATIVE FREE 10 ML: 5 INJECTION INTRAVENOUS at 08:51

## 2025-01-24 RX ADMIN — ROCURONIUM BROMIDE 70 MG: 10 INJECTION, SOLUTION INTRAVENOUS at 15:49

## 2025-01-24 RX ADMIN — ACETAMINOPHEN 650 MG: 325 TABLET ORAL at 19:49

## 2025-01-24 RX ADMIN — PROPOFOL 10 MCG/KG/MIN: 10 INJECTION, EMULSION INTRAVENOUS at 06:38

## 2025-01-24 RX ADMIN — HEPARIN SODIUM 5000 UNITS: 5000 INJECTION INTRAVENOUS; SUBCUTANEOUS at 14:03

## 2025-01-24 RX ADMIN — CARVEDILOL 6.25 MG: 6.25 TABLET, FILM COATED ORAL at 21:09

## 2025-01-24 RX ADMIN — POTASSIUM BICARBONATE 40 MEQ: 782 TABLET, EFFERVESCENT ORAL at 21:09

## 2025-01-24 RX ADMIN — SODIUM CHLORIDE, PRESERVATIVE FREE 10 ML: 5 INJECTION INTRAVENOUS at 21:10

## 2025-01-24 RX ADMIN — POTASSIUM BICARBONATE 40 MEQ: 782 TABLET, EFFERVESCENT ORAL at 19:46

## 2025-01-24 RX ADMIN — NICARDIPINE HYDROCHLORIDE 7.5 MG/HR: 0.1 INJECTION INTRAVENOUS at 08:50

## 2025-01-24 RX ADMIN — ACETAMINOPHEN 650 MG: 325 TABLET ORAL at 13:07

## 2025-01-24 RX ADMIN — ROCURONIUM BROMIDE 71 MG: 10 INJECTION, SOLUTION INTRAVENOUS at 06:33

## 2025-01-24 RX ADMIN — SODIUM PHOSPHATE, MONOBASIC, MONOHYDRATE AND SODIUM PHOSPHATE, DIBASIC, ANHYDROUS 45 MMOL: 276; 142 INJECTION, SOLUTION INTRAVENOUS at 20:30

## 2025-01-24 RX ADMIN — NICARDIPINE HYDROCHLORIDE 5 MG/HR: 0.1 INJECTION INTRAVENOUS at 17:50

## 2025-01-24 RX ADMIN — NICARDIPINE HYDROCHLORIDE 7.5 MG/HR: 0.1 INJECTION INTRAVENOUS at 05:35

## 2025-01-24 ASSESSMENT — PULMONARY FUNCTION TESTS
PIF_VALUE: 17
PIF_VALUE: 25
PIF_VALUE: 24
PIF_VALUE: 19
PIF_VALUE: 19
PIF_VALUE: 18
PIF_VALUE: 24
PIF_VALUE: 17
PIF_VALUE: 18
PIF_VALUE: 18
PIF_VALUE: 23
PIF_VALUE: 15
PIF_VALUE: 21
PIF_VALUE: 15
PIF_VALUE: 19
PIF_VALUE: 19
PIF_VALUE: 15
PIF_VALUE: 17
PIF_VALUE: 21
PIF_VALUE: 20
PIF_VALUE: 17
PIF_VALUE: 19
PIF_VALUE: 7
PIF_VALUE: 20
PIF_VALUE: 20
PIF_VALUE: 22
PIF_VALUE: 21
PIF_VALUE: 22
PIF_VALUE: 25
PIF_VALUE: 15
PIF_VALUE: 22
PIF_VALUE: 19
PIF_VALUE: 20
PIF_VALUE: 19
PIF_VALUE: 17
PIF_VALUE: 20
PIF_VALUE: 16
PIF_VALUE: 17
PIF_VALUE: 19
PIF_VALUE: 16
PIF_VALUE: 23
PIF_VALUE: 15
PIF_VALUE: 20
PIF_VALUE: 16
PIF_VALUE: 15
PIF_VALUE: 15
PIF_VALUE: 17
PIF_VALUE: 19
PIF_VALUE: 22
PIF_VALUE: 15
PIF_VALUE: 19
PIF_VALUE: 21
PIF_VALUE: 20
PIF_VALUE: 16
PIF_VALUE: 20
PIF_VALUE: 15
PIF_VALUE: 18
PIF_VALUE: 17
PIF_VALUE: 20
PIF_VALUE: 15
PIF_VALUE: 17
PIF_VALUE: 15
PIF_VALUE: 16
PIF_VALUE: 32
PIF_VALUE: 21
PIF_VALUE: 15
PIF_VALUE: 16
PIF_VALUE: 20
PIF_VALUE: 21
PIF_VALUE: 17
PIF_VALUE: 19
PIF_VALUE: 17
PIF_VALUE: 17
PIF_VALUE: 22
PIF_VALUE: 15
PIF_VALUE: 19
PIF_VALUE: 15
PIF_VALUE: 19
PIF_VALUE: 19
PIF_VALUE: 16
PIF_VALUE: 18
PIF_VALUE: 16
PIF_VALUE: 24
PIF_VALUE: 20
PIF_VALUE: 15
PIF_VALUE: 19
PIF_VALUE: 18
PIF_VALUE: 23
PIF_VALUE: 19
PIF_VALUE: 21
PIF_VALUE: 17
PIF_VALUE: 19
PIF_VALUE: 19
PIF_VALUE: 16
PIF_VALUE: 19

## 2025-01-24 ASSESSMENT — PAIN SCALES - GENERAL
PAINLEVEL_OUTOF10: 0

## 2025-01-25 LAB
ALBUMIN SERPL-MCNC: 3.3 G/DL (ref 3.4–5)
ALBUMIN SERPL-MCNC: 3.5 G/DL (ref 3.4–5)
ANION GAP SERPL CALCULATED.3IONS-SCNC: 11 MMOL/L (ref 3–16)
ANION GAP SERPL CALCULATED.3IONS-SCNC: 11 MMOL/L (ref 3–16)
ANION GAP SERPL CALCULATED.3IONS-SCNC: 12 MMOL/L (ref 3–16)
BASE EXCESS BLDA CALC-SCNC: 0 MMOL/L (ref -3–3)
BUN SERPL-MCNC: 14 MG/DL (ref 7–20)
BUN SERPL-MCNC: 16 MG/DL (ref 7–20)
BUN SERPL-MCNC: 16 MG/DL (ref 7–20)
CALCIUM SERPL-MCNC: 8.3 MG/DL (ref 8.3–10.6)
CALCIUM SERPL-MCNC: 8.4 MG/DL (ref 8.3–10.6)
CALCIUM SERPL-MCNC: 8.5 MG/DL (ref 8.3–10.6)
CHLORIDE SERPL-SCNC: 109 MMOL/L (ref 99–110)
CHLORIDE SERPL-SCNC: 110 MMOL/L (ref 99–110)
CHLORIDE SERPL-SCNC: 110 MMOL/L (ref 99–110)
CO2 BLDA-SCNC: 25 MMOL/L
CO2 SERPL-SCNC: 22 MMOL/L (ref 21–32)
CO2 SERPL-SCNC: 24 MMOL/L (ref 21–32)
CO2 SERPL-SCNC: 24 MMOL/L (ref 21–32)
CREAT SERPL-MCNC: 0.5 MG/DL (ref 0.6–1.2)
CREAT SERPL-MCNC: 0.5 MG/DL (ref 0.6–1.2)
CREAT SERPL-MCNC: 0.6 MG/DL (ref 0.6–1.2)
DEPRECATED RDW RBC AUTO: 13.2 % (ref 12.4–15.4)
GFR SERPLBLD CREATININE-BSD FMLA CKD-EPI: >90 ML/MIN/{1.73_M2}
GLUCOSE BLD-MCNC: 100 MG/DL (ref 70–99)
GLUCOSE SERPL-MCNC: 114 MG/DL (ref 70–99)
GLUCOSE SERPL-MCNC: 119 MG/DL (ref 70–99)
GLUCOSE SERPL-MCNC: 121 MG/DL (ref 70–99)
HCO3 BLDA-SCNC: 24.2 MMOL/L (ref 21–29)
HCT VFR BLD AUTO: 32.6 % (ref 36–48)
HGB BLD-MCNC: 11.1 G/DL (ref 12–16)
MAGNESIUM SERPL-MCNC: 2.13 MG/DL (ref 1.8–2.4)
MCH RBC QN AUTO: 34.4 PG (ref 26–34)
MCHC RBC AUTO-ENTMCNC: 33.9 G/DL (ref 31–36)
MCV RBC AUTO: 101.5 FL (ref 80–100)
ORGANISM: ABNORMAL
PCO2 BLDA: 35.8 MM HG (ref 35–45)
PERFORMED ON: ABNORMAL
PH BLDA: 7.44 [PH] (ref 7.35–7.45)
PHOSPHATE SERPL-MCNC: 2.2 MG/DL (ref 2.5–4.9)
PHOSPHATE SERPL-MCNC: 2.4 MG/DL (ref 2.5–4.9)
PHOSPHATE SERPL-MCNC: 2.4 MG/DL (ref 2.5–4.9)
PLATELET # BLD AUTO: 201 K/UL (ref 135–450)
PMV BLD AUTO: 7.1 FL (ref 5–10.5)
PO2 BLDA: 108.4 MM HG (ref 75–108)
POC SAMPLE TYPE: ABNORMAL
POTASSIUM SERPL-SCNC: 3 MMOL/L (ref 3.5–5.1)
POTASSIUM SERPL-SCNC: 3.3 MMOL/L (ref 3.5–5.1)
POTASSIUM SERPL-SCNC: 3.6 MMOL/L (ref 3.5–5.1)
RBC # BLD AUTO: 3.21 M/UL (ref 4–5.2)
REPORT: NORMAL
RESP PATH DNA+RNA PNL L RESP NAA+NON-PRB: ABNORMAL
SAO2 % BLDA: 98 % (ref 93–100)
SODIUM SERPL-SCNC: 143 MMOL/L (ref 136–145)
SODIUM SERPL-SCNC: 144 MMOL/L (ref 136–145)
SODIUM SERPL-SCNC: 146 MMOL/L (ref 136–145)
WBC # BLD AUTO: 13.1 K/UL (ref 4–11)

## 2025-01-25 PROCEDURE — 51798 US URINE CAPACITY MEASURE: CPT

## 2025-01-25 PROCEDURE — 6360000002 HC RX W HCPCS

## 2025-01-25 PROCEDURE — 99291 CRITICAL CARE FIRST HOUR: CPT | Performed by: INTERNAL MEDICINE

## 2025-01-25 PROCEDURE — 6370000000 HC RX 637 (ALT 250 FOR IP)

## 2025-01-25 PROCEDURE — 80069 RENAL FUNCTION PANEL: CPT

## 2025-01-25 PROCEDURE — 31624 DX BRONCHOSCOPE/LAVAGE: CPT | Performed by: INTERNAL MEDICINE

## 2025-01-25 PROCEDURE — 2500000003 HC RX 250 WO HCPCS

## 2025-01-25 PROCEDURE — 82947 ASSAY GLUCOSE BLOOD QUANT: CPT

## 2025-01-25 PROCEDURE — 84100 ASSAY OF PHOSPHORUS: CPT

## 2025-01-25 PROCEDURE — 0B9J8ZX DRAINAGE OF LEFT LOWER LUNG LOBE, VIA NATURAL OR ARTIFICIAL OPENING ENDOSCOPIC, DIAGNOSTIC: ICD-10-PCS | Performed by: INTERNAL MEDICINE

## 2025-01-25 PROCEDURE — 94003 VENT MGMT INPAT SUBQ DAY: CPT

## 2025-01-25 PROCEDURE — 2700000000 HC OXYGEN THERAPY PER DAY

## 2025-01-25 PROCEDURE — 2000000000 HC ICU R&B

## 2025-01-25 PROCEDURE — 85027 COMPLETE CBC AUTOMATED: CPT

## 2025-01-25 PROCEDURE — 87641 MR-STAPH DNA AMP PROBE: CPT

## 2025-01-25 PROCEDURE — 6360000002 HC RX W HCPCS: Performed by: NURSE PRACTITIONER

## 2025-01-25 PROCEDURE — 36415 COLL VENOUS BLD VENIPUNCTURE: CPT

## 2025-01-25 PROCEDURE — 51701 INSERT BLADDER CATHETER: CPT

## 2025-01-25 PROCEDURE — 82803 BLOOD GASES ANY COMBINATION: CPT

## 2025-01-25 PROCEDURE — 83735 ASSAY OF MAGNESIUM: CPT

## 2025-01-25 PROCEDURE — 94761 N-INVAS EAR/PLS OXIMETRY MLT: CPT

## 2025-01-25 PROCEDURE — 99291 CRITICAL CARE FIRST HOUR: CPT | Performed by: NURSE PRACTITIONER

## 2025-01-25 RX ORDER — VANCOMYCIN 1.25 G/250ML
1750 INJECTION, SOLUTION INTRAVENOUS ONCE
Status: COMPLETED | OUTPATIENT
Start: 2025-01-25 | End: 2025-01-25

## 2025-01-25 RX ORDER — VANCOMYCIN 1 G/200ML
1000 INJECTION, SOLUTION INTRAVENOUS EVERY 12 HOURS
Status: DISCONTINUED | OUTPATIENT
Start: 2025-01-25 | End: 2025-01-27 | Stop reason: DRUGHIGH

## 2025-01-25 RX ORDER — POTASSIUM CHLORIDE 7.45 MG/ML
10 INJECTION INTRAVENOUS
Status: COMPLETED | OUTPATIENT
Start: 2025-01-25 | End: 2025-01-25

## 2025-01-25 RX ADMIN — METHOCARBAMOL 1000 MG: 100 INJECTION INTRAMUSCULAR; INTRAVENOUS at 10:59

## 2025-01-25 RX ADMIN — LANSOPRAZOLE 30 MG: 30 TABLET, ORALLY DISINTEGRATING, DELAYED RELEASE ORAL at 08:13

## 2025-01-25 RX ADMIN — HEPARIN SODIUM 5000 UNITS: 5000 INJECTION INTRAVENOUS; SUBCUTANEOUS at 21:04

## 2025-01-25 RX ADMIN — ACETAMINOPHEN 650 MG: 325 TABLET ORAL at 04:28

## 2025-01-25 RX ADMIN — SODIUM CHLORIDE, PRESERVATIVE FREE 10 ML: 5 INJECTION INTRAVENOUS at 08:14

## 2025-01-25 RX ADMIN — NICARDIPINE HYDROCHLORIDE 5 MG/HR: 0.1 INJECTION INTRAVENOUS at 13:01

## 2025-01-25 RX ADMIN — POTASSIUM CHLORIDE 10 MEQ: 7.46 INJECTION, SOLUTION INTRAVENOUS at 02:56

## 2025-01-25 RX ADMIN — METHOCARBAMOL 1000 MG: 100 INJECTION INTRAMUSCULAR; INTRAVENOUS at 02:59

## 2025-01-25 RX ADMIN — METHOCARBAMOL 1000 MG: 100 INJECTION INTRAMUSCULAR; INTRAVENOUS at 18:11

## 2025-01-25 RX ADMIN — ACETAMINOPHEN 650 MG: 325 TABLET ORAL at 14:03

## 2025-01-25 RX ADMIN — HEPARIN SODIUM 5000 UNITS: 5000 INJECTION INTRAVENOUS; SUBCUTANEOUS at 14:03

## 2025-01-25 RX ADMIN — CARVEDILOL 6.25 MG: 6.25 TABLET, FILM COATED ORAL at 21:04

## 2025-01-25 RX ADMIN — POTASSIUM CHLORIDE 10 MEQ: 7.46 INJECTION, SOLUTION INTRAVENOUS at 02:00

## 2025-01-25 RX ADMIN — LABETALOL HYDROCHLORIDE 10 MG: 5 INJECTION, SOLUTION INTRAVENOUS at 11:48

## 2025-01-25 RX ADMIN — CARVEDILOL 6.25 MG: 6.25 TABLET, FILM COATED ORAL at 08:13

## 2025-01-25 RX ADMIN — POTASSIUM BICARBONATE 40 MEQ: 782 TABLET, EFFERVESCENT ORAL at 03:47

## 2025-01-25 RX ADMIN — HEPARIN SODIUM 5000 UNITS: 5000 INJECTION INTRAVENOUS; SUBCUTANEOUS at 05:32

## 2025-01-25 RX ADMIN — VANCOMYCIN 1000 MG: 1 INJECTION, SOLUTION INTRAVENOUS at 21:07

## 2025-01-25 RX ADMIN — VANCOMYCIN 1750 MG: 1.25 INJECTION, SOLUTION INTRAVENOUS at 06:49

## 2025-01-25 RX ADMIN — SODIUM CHLORIDE, PRESERVATIVE FREE 10 ML: 5 INJECTION INTRAVENOUS at 21:04

## 2025-01-25 RX ADMIN — NICARDIPINE HYDROCHLORIDE 2.5 MG/HR: 0.1 INJECTION INTRAVENOUS at 23:12

## 2025-01-25 ASSESSMENT — PAIN SCALES - GENERAL
PAINLEVEL_OUTOF10: 0

## 2025-01-25 ASSESSMENT — PULMONARY FUNCTION TESTS
PIF_VALUE: 15
PIF_VALUE: 17
PIF_VALUE: 15
PIF_VALUE: 15
PIF_VALUE: 17
PIF_VALUE: 15
PIF_VALUE: 16
PIF_VALUE: 17
PIF_VALUE: 15
PIF_VALUE: 16
PIF_VALUE: 15
PIF_VALUE: 15
PIF_VALUE: 17
PIF_VALUE: 15
PIF_VALUE: 15
PIF_VALUE: 16
PIF_VALUE: 15
PIF_VALUE: 19
PIF_VALUE: 15
PIF_VALUE: 15
PIF_VALUE: 16
PIF_VALUE: 15
PIF_VALUE: 15
PIF_VALUE: 17
PIF_VALUE: 15
PIF_VALUE: 18
PIF_VALUE: 15
PIF_VALUE: 15
PIF_VALUE: 7
PIF_VALUE: 18
PIF_VALUE: 15
PIF_VALUE: 7
PIF_VALUE: 15
PIF_VALUE: 16
PIF_VALUE: 15
PIF_VALUE: 16
PIF_VALUE: 16
PIF_VALUE: 17
PIF_VALUE: 18
PIF_VALUE: 15
PIF_VALUE: 16
PIF_VALUE: 16
PIF_VALUE: 17
PIF_VALUE: 15
PIF_VALUE: 13
PIF_VALUE: 15
PIF_VALUE: 16
PIF_VALUE: 15
PIF_VALUE: 17
PIF_VALUE: 15
PIF_VALUE: 17
PIF_VALUE: 16
PIF_VALUE: 15
PIF_VALUE: 15
PIF_VALUE: 16

## 2025-01-26 ENCOUNTER — APPOINTMENT (OUTPATIENT)
Dept: MRI IMAGING | Age: 80
DRG: 003 | End: 2025-01-26
Payer: MEDICARE

## 2025-01-26 LAB
ALBUMIN SERPL-MCNC: 2.9 G/DL (ref 3.4–5)
ANION GAP SERPL CALCULATED.3IONS-SCNC: 12 MMOL/L (ref 3–16)
ANION GAP SERPL CALCULATED.3IONS-SCNC: 15 MMOL/L (ref 3–16)
BUN SERPL-MCNC: 15 MG/DL (ref 7–20)
BUN SERPL-MCNC: 16 MG/DL (ref 7–20)
CALCIUM SERPL-MCNC: 8 MG/DL (ref 8.3–10.6)
CALCIUM SERPL-MCNC: 9.3 MG/DL (ref 8.3–10.6)
CHLORIDE SERPL-SCNC: 110 MMOL/L (ref 99–110)
CHLORIDE SERPL-SCNC: 117 MMOL/L (ref 99–110)
CO2 SERPL-SCNC: 20 MMOL/L (ref 21–32)
CO2 SERPL-SCNC: 22 MMOL/L (ref 21–32)
CREAT SERPL-MCNC: 0.5 MG/DL (ref 0.6–1.2)
CREAT SERPL-MCNC: 0.5 MG/DL (ref 0.6–1.2)
DEPRECATED RDW RBC AUTO: 13.2 % (ref 12.4–15.4)
GFR SERPLBLD CREATININE-BSD FMLA CKD-EPI: >90 ML/MIN/{1.73_M2}
GFR SERPLBLD CREATININE-BSD FMLA CKD-EPI: >90 ML/MIN/{1.73_M2}
GLUCOSE SERPL-MCNC: 105 MG/DL (ref 70–99)
GLUCOSE SERPL-MCNC: 114 MG/DL (ref 70–99)
HCT VFR BLD AUTO: 32.9 % (ref 36–48)
HGB BLD-MCNC: 11.1 G/DL (ref 12–16)
MAGNESIUM SERPL-MCNC: 2.31 MG/DL (ref 1.8–2.4)
MCH RBC QN AUTO: 34.4 PG (ref 26–34)
MCHC RBC AUTO-ENTMCNC: 33.7 G/DL (ref 31–36)
MCV RBC AUTO: 102 FL (ref 80–100)
MRSA DNA SPEC QL NAA+PROBE: NORMAL
PHOSPHATE SERPL-MCNC: 1.8 MG/DL (ref 2.5–4.9)
PHOSPHATE SERPL-MCNC: 1.9 MG/DL (ref 2.5–4.9)
PLATELET # BLD AUTO: 239 K/UL (ref 135–450)
PMV BLD AUTO: 7.3 FL (ref 5–10.5)
POTASSIUM SERPL-SCNC: 2.6 MMOL/L (ref 3.5–5.1)
POTASSIUM SERPL-SCNC: 3 MMOL/L (ref 3.5–5.1)
RBC # BLD AUTO: 3.23 M/UL (ref 4–5.2)
SODIUM SERPL-SCNC: 147 MMOL/L (ref 136–145)
SODIUM SERPL-SCNC: 149 MMOL/L (ref 136–145)
TRIGL SERPL-MCNC: 206 MG/DL (ref 0–150)
WBC # BLD AUTO: 12.9 K/UL (ref 4–11)

## 2025-01-26 PROCEDURE — 6360000002 HC RX W HCPCS: Performed by: NURSE PRACTITIONER

## 2025-01-26 PROCEDURE — 84478 ASSAY OF TRIGLYCERIDES: CPT

## 2025-01-26 PROCEDURE — 2700000000 HC OXYGEN THERAPY PER DAY

## 2025-01-26 PROCEDURE — 80069 RENAL FUNCTION PANEL: CPT

## 2025-01-26 PROCEDURE — 94003 VENT MGMT INPAT SUBQ DAY: CPT

## 2025-01-26 PROCEDURE — 70553 MRI BRAIN STEM W/O & W/DYE: CPT

## 2025-01-26 PROCEDURE — 2580000003 HC RX 258

## 2025-01-26 PROCEDURE — A9576 INJ PROHANCE MULTIPACK: HCPCS | Performed by: STUDENT IN AN ORGANIZED HEALTH CARE EDUCATION/TRAINING PROGRAM

## 2025-01-26 PROCEDURE — 83735 ASSAY OF MAGNESIUM: CPT

## 2025-01-26 PROCEDURE — 6370000000 HC RX 637 (ALT 250 FOR IP)

## 2025-01-26 PROCEDURE — 6360000002 HC RX W HCPCS

## 2025-01-26 PROCEDURE — 2500000003 HC RX 250 WO HCPCS

## 2025-01-26 PROCEDURE — 99291 CRITICAL CARE FIRST HOUR: CPT | Performed by: INTERNAL MEDICINE

## 2025-01-26 PROCEDURE — 2000000000 HC ICU R&B

## 2025-01-26 PROCEDURE — 85027 COMPLETE CBC AUTOMATED: CPT

## 2025-01-26 PROCEDURE — 6370000000 HC RX 637 (ALT 250 FOR IP): Performed by: STUDENT IN AN ORGANIZED HEALTH CARE EDUCATION/TRAINING PROGRAM

## 2025-01-26 PROCEDURE — 94761 N-INVAS EAR/PLS OXIMETRY MLT: CPT

## 2025-01-26 PROCEDURE — 99291 CRITICAL CARE FIRST HOUR: CPT | Performed by: NURSE PRACTITIONER

## 2025-01-26 PROCEDURE — 84100 ASSAY OF PHOSPHORUS: CPT

## 2025-01-26 PROCEDURE — 6360000004 HC RX CONTRAST MEDICATION: Performed by: STUDENT IN AN ORGANIZED HEALTH CARE EDUCATION/TRAINING PROGRAM

## 2025-01-26 RX ORDER — CARVEDILOL 6.25 MG/1
12.5 TABLET ORAL 2 TIMES DAILY
Status: DISCONTINUED | OUTPATIENT
Start: 2025-01-26 | End: 2025-01-29

## 2025-01-26 RX ORDER — HYDRALAZINE HYDROCHLORIDE 20 MG/ML
5 INJECTION INTRAMUSCULAR; INTRAVENOUS EVERY 4 HOURS PRN
Status: DISCONTINUED | OUTPATIENT
Start: 2025-01-26 | End: 2025-02-10

## 2025-01-26 RX ORDER — CASTOR OIL AND BALSAM, PERU 788; 87 MG/G; MG/G
OINTMENT TOPICAL 2 TIMES DAILY
Status: DISCONTINUED | OUTPATIENT
Start: 2025-01-26 | End: 2025-02-10

## 2025-01-26 RX ADMIN — CARVEDILOL 12.5 MG: 6.25 TABLET, FILM COATED ORAL at 21:01

## 2025-01-26 RX ADMIN — SODIUM CHLORIDE, PRESERVATIVE FREE 10 ML: 5 INJECTION INTRAVENOUS at 21:01

## 2025-01-26 RX ADMIN — HEPARIN SODIUM 5000 UNITS: 5000 INJECTION INTRAVENOUS; SUBCUTANEOUS at 21:30

## 2025-01-26 RX ADMIN — LANSOPRAZOLE 30 MG: 30 TABLET, ORALLY DISINTEGRATING, DELAYED RELEASE ORAL at 08:04

## 2025-01-26 RX ADMIN — CARVEDILOL 6.25 MG: 6.25 TABLET, FILM COATED ORAL at 08:04

## 2025-01-26 RX ADMIN — POTASSIUM PHOSPHATE, MONOBASIC AND POTASSIUM PHOSPHATE, DIBASIC 20 MMOL: 224; 236 INJECTION, SOLUTION, CONCENTRATE INTRAVENOUS at 06:34

## 2025-01-26 RX ADMIN — LABETALOL HYDROCHLORIDE 10 MG: 5 INJECTION, SOLUTION INTRAVENOUS at 17:40

## 2025-01-26 RX ADMIN — POTASSIUM BICARBONATE 40 MEQ: 782 TABLET, EFFERVESCENT ORAL at 06:32

## 2025-01-26 RX ADMIN — SODIUM PHOSPHATE, MONOBASIC, MONOHYDRATE AND SODIUM PHOSPHATE, DIBASIC, ANHYDROUS 30 MMOL: 142; 276 INJECTION, SOLUTION INTRAVENOUS at 21:22

## 2025-01-26 RX ADMIN — POTASSIUM BICARBONATE 40 MEQ: 782 TABLET, EFFERVESCENT ORAL at 21:00

## 2025-01-26 RX ADMIN — NICARDIPINE HYDROCHLORIDE 2.5 MG/HR: 0.1 INJECTION INTRAVENOUS at 14:13

## 2025-01-26 RX ADMIN — METHOCARBAMOL 1000 MG: 100 INJECTION INTRAMUSCULAR; INTRAVENOUS at 02:00

## 2025-01-26 RX ADMIN — METHOCARBAMOL 1000 MG: 100 INJECTION INTRAMUSCULAR; INTRAVENOUS at 18:25

## 2025-01-26 RX ADMIN — METHOCARBAMOL 1000 MG: 100 INJECTION INTRAMUSCULAR; INTRAVENOUS at 10:05

## 2025-01-26 RX ADMIN — GADOTERIDOL 14 ML: 279.3 INJECTION, SOLUTION INTRAVENOUS at 18:10

## 2025-01-26 RX ADMIN — HEPARIN SODIUM 5000 UNITS: 5000 INJECTION INTRAVENOUS; SUBCUTANEOUS at 06:28

## 2025-01-26 RX ADMIN — VANCOMYCIN 1000 MG: 1 INJECTION, SOLUTION INTRAVENOUS at 21:28

## 2025-01-26 RX ADMIN — VANCOMYCIN 1000 MG: 1 INJECTION, SOLUTION INTRAVENOUS at 08:26

## 2025-01-26 RX ADMIN — HEPARIN SODIUM 5000 UNITS: 5000 INJECTION INTRAVENOUS; SUBCUTANEOUS at 13:06

## 2025-01-26 RX ADMIN — NICARDIPINE HYDROCHLORIDE 2.5 MG/HR: 0.1 INJECTION INTRAVENOUS at 18:27

## 2025-01-26 RX ADMIN — SODIUM CHLORIDE, PRESERVATIVE FREE 10 ML: 5 INJECTION INTRAVENOUS at 08:04

## 2025-01-26 RX ADMIN — Medication: at 21:15

## 2025-01-26 RX ADMIN — Medication: at 10:05

## 2025-01-26 ASSESSMENT — PULMONARY FUNCTION TESTS
PIF_VALUE: 17
PIF_VALUE: 15
PIF_VALUE: 15
PIF_VALUE: 22
PIF_VALUE: 15
PIF_VALUE: 15
PIF_VALUE: 6
PIF_VALUE: 18
PIF_VALUE: 15
PIF_VALUE: 18
PIF_VALUE: 18
PIF_VALUE: 15
PIF_VALUE: 19
PIF_VALUE: 15
PIF_VALUE: 17
PIF_VALUE: 15
PIF_VALUE: 21
PIF_VALUE: 19
PIF_VALUE: 17
PIF_VALUE: 15
PIF_VALUE: 17
PIF_VALUE: 15
PIF_VALUE: 19
PIF_VALUE: 15
PIF_VALUE: 15
PIF_VALUE: 17
PIF_VALUE: 15
PIF_VALUE: 16
PIF_VALUE: 15
PIF_VALUE: 18
PIF_VALUE: 18
PIF_VALUE: 16
PIF_VALUE: 15
PIF_VALUE: 16
PIF_VALUE: 16
PIF_VALUE: 15
PIF_VALUE: 16
PIF_VALUE: 19
PIF_VALUE: 15
PIF_VALUE: 15
PIF_VALUE: 19
PIF_VALUE: 15
PIF_VALUE: 16
PIF_VALUE: 18
PIF_VALUE: 15
PIF_VALUE: 15
PIF_VALUE: 17
PIF_VALUE: 15
PIF_VALUE: 15
PIF_VALUE: 19
PIF_VALUE: 15
PIF_VALUE: 9
PIF_VALUE: 16
PIF_VALUE: 9
PIF_VALUE: 15
PIF_VALUE: 19
PIF_VALUE: 15
PIF_VALUE: 16
PIF_VALUE: 15
PIF_VALUE: 15
PIF_VALUE: 16
PIF_VALUE: 17
PIF_VALUE: 17
PIF_VALUE: 18
PIF_VALUE: 15
PIF_VALUE: 16
PIF_VALUE: 15
PIF_VALUE: 16
PIF_VALUE: 15

## 2025-01-26 ASSESSMENT — PAIN SCALES - GENERAL
PAINLEVEL_OUTOF10: 0

## 2025-01-27 ENCOUNTER — APPOINTMENT (OUTPATIENT)
Dept: CT IMAGING | Age: 80
DRG: 003 | End: 2025-01-27
Payer: MEDICARE

## 2025-01-27 LAB
ANION GAP SERPL CALCULATED.3IONS-SCNC: 15 MMOL/L (ref 3–16)
BUN SERPL-MCNC: 17 MG/DL (ref 7–20)
CALCIUM SERPL-MCNC: 9.7 MG/DL (ref 8.3–10.6)
CHLORIDE SERPL-SCNC: 115 MMOL/L (ref 99–110)
CO2 SERPL-SCNC: 23 MMOL/L (ref 21–32)
CREAT SERPL-MCNC: 0.5 MG/DL (ref 0.6–1.2)
DEPRECATED RDW RBC AUTO: 13.1 % (ref 12.4–15.4)
GFR SERPLBLD CREATININE-BSD FMLA CKD-EPI: >90 ML/MIN/{1.73_M2}
GLUCOSE SERPL-MCNC: 112 MG/DL (ref 70–99)
HCT VFR BLD AUTO: 31.9 % (ref 36–48)
HGB BLD-MCNC: 11 G/DL (ref 12–16)
MAGNESIUM SERPL-MCNC: 2.34 MG/DL (ref 1.8–2.4)
MCH RBC QN AUTO: 35.4 PG (ref 26–34)
MCHC RBC AUTO-ENTMCNC: 34.4 G/DL (ref 31–36)
MCV RBC AUTO: 102.9 FL (ref 80–100)
PHOSPHATE SERPL-MCNC: 2.9 MG/DL (ref 2.5–4.9)
PLATELET # BLD AUTO: 254 K/UL (ref 135–450)
PMV BLD AUTO: 7.7 FL (ref 5–10.5)
POTASSIUM SERPL-SCNC: 3.4 MMOL/L (ref 3.5–5.1)
POTASSIUM SERPL-SCNC: ABNORMAL MMOL/L (ref 3.5–5.1)
PROCALCITONIN SERPL IA-MCNC: 0.18 NG/ML (ref 0–0.15)
RBC # BLD AUTO: 3.1 M/UL (ref 4–5.2)
REASON FOR REJECTION: NORMAL
REJECTED TEST: NORMAL
SODIUM SERPL-SCNC: 153 MMOL/L (ref 136–145)
VANCOMYCIN SERPL-MCNC: 12.6 UG/ML
WBC # BLD AUTO: 12.2 K/UL (ref 4–11)

## 2025-01-27 PROCEDURE — 84145 PROCALCITONIN (PCT): CPT

## 2025-01-27 PROCEDURE — 2500000003 HC RX 250 WO HCPCS

## 2025-01-27 PROCEDURE — 70450 CT HEAD/BRAIN W/O DYE: CPT

## 2025-01-27 PROCEDURE — 6360000002 HC RX W HCPCS

## 2025-01-27 PROCEDURE — 84132 ASSAY OF SERUM POTASSIUM: CPT

## 2025-01-27 PROCEDURE — 85027 COMPLETE CBC AUTOMATED: CPT

## 2025-01-27 PROCEDURE — 80202 ASSAY OF VANCOMYCIN: CPT

## 2025-01-27 PROCEDURE — APPNB45 APP NON BILLABLE 31-45 MINUTES: Performed by: NURSE PRACTITIONER

## 2025-01-27 PROCEDURE — 94003 VENT MGMT INPAT SUBQ DAY: CPT

## 2025-01-27 PROCEDURE — 83735 ASSAY OF MAGNESIUM: CPT

## 2025-01-27 PROCEDURE — 2000000000 HC ICU R&B

## 2025-01-27 PROCEDURE — 009630Z DRAINAGE OF CEREBRAL VENTRICLE WITH DRAINAGE DEVICE, PERCUTANEOUS APPROACH: ICD-10-PCS | Performed by: STUDENT IN AN ORGANIZED HEALTH CARE EDUCATION/TRAINING PROGRAM

## 2025-01-27 PROCEDURE — 99291 CRITICAL CARE FIRST HOUR: CPT | Performed by: NURSE PRACTITIONER

## 2025-01-27 PROCEDURE — 6360000002 HC RX W HCPCS: Performed by: NURSE PRACTITIONER

## 2025-01-27 PROCEDURE — 84100 ASSAY OF PHOSPHORUS: CPT

## 2025-01-27 PROCEDURE — 80048 BASIC METABOLIC PNL TOTAL CA: CPT

## 2025-01-27 PROCEDURE — 99291 CRITICAL CARE FIRST HOUR: CPT | Performed by: INTERNAL MEDICINE

## 2025-01-27 PROCEDURE — 2700000000 HC OXYGEN THERAPY PER DAY

## 2025-01-27 PROCEDURE — 6370000000 HC RX 637 (ALT 250 FOR IP)

## 2025-01-27 PROCEDURE — 94761 N-INVAS EAR/PLS OXIMETRY MLT: CPT

## 2025-01-27 PROCEDURE — 99024 POSTOP FOLLOW-UP VISIT: CPT | Performed by: NURSE PRACTITIONER

## 2025-01-27 RX ORDER — VANCOMYCIN HCL IN 5 % DEXTROSE 1.25 G/25
1250 PLASTIC BAG, INJECTION (ML) INTRAVENOUS EVERY 12 HOURS
Status: DISCONTINUED | OUTPATIENT
Start: 2025-01-27 | End: 2025-01-28

## 2025-01-27 RX ORDER — ENALAPRILAT 1.25 MG/ML
1.25 INJECTION INTRAVENOUS EVERY 6 HOURS SCHEDULED
Status: DISCONTINUED | OUTPATIENT
Start: 2025-01-27 | End: 2025-01-29

## 2025-01-27 RX ORDER — LIDOCAINE HYDROCHLORIDE 10 MG/ML
100 INJECTION, SOLUTION EPIDURAL; INFILTRATION; INTRACAUDAL; PERINEURAL ONCE
Status: COMPLETED | OUTPATIENT
Start: 2025-01-27 | End: 2025-01-27

## 2025-01-27 RX ORDER — VANCOMYCIN HCL IN 5 % DEXTROSE 1.25 G/25
1250 PLASTIC BAG, INJECTION (ML) INTRAVENOUS EVERY 12 HOURS
Status: DISCONTINUED | OUTPATIENT
Start: 2025-01-27 | End: 2025-01-27

## 2025-01-27 RX ORDER — SENNOSIDES A AND B 8.6 MG/1
1 TABLET, FILM COATED ORAL NIGHTLY
Status: DISCONTINUED | OUTPATIENT
Start: 2025-01-27 | End: 2025-01-31

## 2025-01-27 RX ADMIN — HYDRALAZINE HYDROCHLORIDE 5 MG: 20 INJECTION INTRAMUSCULAR; INTRAVENOUS at 20:10

## 2025-01-27 RX ADMIN — HYDRALAZINE HYDROCHLORIDE 5 MG: 20 INJECTION INTRAMUSCULAR; INTRAVENOUS at 01:54

## 2025-01-27 RX ADMIN — SODIUM CHLORIDE, PRESERVATIVE FREE 10 ML: 5 INJECTION INTRAVENOUS at 07:41

## 2025-01-27 RX ADMIN — SODIUM CHLORIDE, PRESERVATIVE FREE 10 ML: 5 INJECTION INTRAVENOUS at 20:14

## 2025-01-27 RX ADMIN — VANCOMYCIN HYDROCHLORIDE 1250 MG: 1.25 INJECTION, SOLUTION INTRAVITREAL at 09:36

## 2025-01-27 RX ADMIN — VANCOMYCIN HYDROCHLORIDE 1250 MG: 1.25 INJECTION, SOLUTION INTRAVITREAL at 21:27

## 2025-01-27 RX ADMIN — LANSOPRAZOLE 30 MG: 30 TABLET, ORALLY DISINTEGRATING, DELAYED RELEASE ORAL at 07:42

## 2025-01-27 RX ADMIN — POTASSIUM BICARBONATE 40 MEQ: 782 TABLET, EFFERVESCENT ORAL at 02:36

## 2025-01-27 RX ADMIN — HYDRALAZINE HYDROCHLORIDE 5 MG: 20 INJECTION INTRAMUSCULAR; INTRAVENOUS at 12:32

## 2025-01-27 RX ADMIN — LABETALOL HYDROCHLORIDE 10 MG: 5 INJECTION, SOLUTION INTRAVENOUS at 18:49

## 2025-01-27 RX ADMIN — LABETALOL HYDROCHLORIDE 10 MG: 5 INJECTION, SOLUTION INTRAVENOUS at 11:15

## 2025-01-27 RX ADMIN — Medication: at 07:41

## 2025-01-27 RX ADMIN — POTASSIUM BICARBONATE 40 MEQ: 782 TABLET, EFFERVESCENT ORAL at 17:17

## 2025-01-27 RX ADMIN — LIDOCAINE HYDROCHLORIDE 100 MG: 10 INJECTION, SOLUTION EPIDURAL; INFILTRATION; INTRACAUDAL; PERINEURAL at 17:27

## 2025-01-27 RX ADMIN — METHOCARBAMOL 1000 MG: 100 INJECTION INTRAMUSCULAR; INTRAVENOUS at 02:36

## 2025-01-27 RX ADMIN — CARVEDILOL 12.5 MG: 6.25 TABLET, FILM COATED ORAL at 20:14

## 2025-01-27 RX ADMIN — POTASSIUM BICARBONATE 40 MEQ: 782 TABLET, EFFERVESCENT ORAL at 00:04

## 2025-01-27 RX ADMIN — HEPARIN SODIUM 5000 UNITS: 5000 INJECTION INTRAVENOUS; SUBCUTANEOUS at 05:55

## 2025-01-27 RX ADMIN — SENNOSIDES 8.6 MG: 8.6 TABLET, COATED ORAL at 20:14

## 2025-01-27 RX ADMIN — ENALAPRILAT 1.25 MG: 2.5 INJECTION INTRAVENOUS at 17:17

## 2025-01-27 RX ADMIN — LABETALOL HYDROCHLORIDE 10 MG: 5 INJECTION, SOLUTION INTRAVENOUS at 00:05

## 2025-01-27 RX ADMIN — HEPARIN SODIUM 5000 UNITS: 5000 INJECTION INTRAVENOUS; SUBCUTANEOUS at 21:30

## 2025-01-27 RX ADMIN — HEPARIN SODIUM 5000 UNITS: 5000 INJECTION INTRAVENOUS; SUBCUTANEOUS at 14:05

## 2025-01-27 RX ADMIN — Medication: at 20:15

## 2025-01-27 RX ADMIN — CARVEDILOL 12.5 MG: 6.25 TABLET, FILM COATED ORAL at 07:42

## 2025-01-27 RX ADMIN — NICARDIPINE HYDROCHLORIDE 5 MG/HR: 0.1 INJECTION INTRAVENOUS at 06:28

## 2025-01-27 ASSESSMENT — PULMONARY FUNCTION TESTS
PIF_VALUE: 15
PIF_VALUE: 19
PIF_VALUE: 15
PIF_VALUE: 18
PIF_VALUE: 15
PIF_VALUE: 17
PIF_VALUE: 15
PIF_VALUE: 17
PIF_VALUE: 17
PIF_VALUE: 15
PIF_VALUE: 17
PIF_VALUE: 15
PIF_VALUE: 7
PIF_VALUE: 20
PIF_VALUE: 15
PIF_VALUE: 16
PIF_VALUE: 18
PIF_VALUE: 15
PIF_VALUE: 18
PIF_VALUE: 15
PIF_VALUE: 19
PIF_VALUE: 15
PIF_VALUE: 15
PIF_VALUE: 17
PIF_VALUE: 15
PIF_VALUE: 16
PIF_VALUE: 15
PIF_VALUE: 16
PIF_VALUE: 18
PIF_VALUE: 15
PIF_VALUE: 15
PIF_VALUE: 7
PIF_VALUE: 15

## 2025-01-27 ASSESSMENT — PAIN SCALES - GENERAL
PAINLEVEL_OUTOF10: 0

## 2025-01-27 NOTE — CARE COORDINATION
DISCHARGE PLANNING:  Chart reviewed.  Patient is from home with her . No current services.    Currently she is intubated with no sedation. Restraints in place.  She is on a Cardene drip.  EVD in place.    [facility] started following patient today.    CM team will continue to follow.  Janet Kumar RN Case Manager  [phone]

## 2025-01-28 LAB
ANION GAP SERPL CALCULATED.3IONS-SCNC: 10 MMOL/L (ref 3–16)
APPEARANCE CSF: CLEAR
BUN SERPL-MCNC: 21 MG/DL (ref 7–20)
CALCIUM SERPL-MCNC: 10.3 MG/DL (ref 8.3–10.6)
CHLORIDE SERPL-SCNC: 112 MMOL/L (ref 99–110)
CLOT EVALUATION CSF: ABNORMAL
CO2 SERPL-SCNC: 30 MMOL/L (ref 21–32)
COLOR CSF: ABNORMAL
CREAT SERPL-MCNC: 0.6 MG/DL (ref 0.6–1.2)
DEPRECATED RDW RBC AUTO: 13.4 % (ref 12.4–15.4)
GFR SERPLBLD CREATININE-BSD FMLA CKD-EPI: >90 ML/MIN/{1.73_M2}
GLUCOSE CSF-MCNC: 101 MG/DL (ref 40–80)
GLUCOSE SERPL-MCNC: 144 MG/DL (ref 70–99)
HCT VFR BLD AUTO: 33.2 % (ref 36–48)
HGB BLD-MCNC: 11.3 G/DL (ref 12–16)
MAGNESIUM SERPL-MCNC: 2.37 MG/DL (ref 1.8–2.4)
MANUAL DIF COMMENT CSF-IMP: ABNORMAL
MCH RBC QN AUTO: 35.1 PG (ref 26–34)
MCHC RBC AUTO-ENTMCNC: 34.1 G/DL (ref 31–36)
MCV RBC AUTO: 103 FL (ref 80–100)
MENING+ENC PNL CSF NAA+NON-PROBE: NORMAL
NUC CELL # FLD MANUAL: 9 /CUMM (ref 0–5)
PHOSPHATE SERPL-MCNC: 3 MG/DL (ref 2.5–4.9)
PLATELET # BLD AUTO: 289 K/UL (ref 135–450)
PMV BLD AUTO: 6.8 FL (ref 5–10.5)
POTASSIUM SERPL-SCNC: 3.8 MMOL/L (ref 3.5–5.1)
PROT CSF-MCNC: 21 MG/DL (ref 15–45)
RBC # BLD AUTO: 3.22 M/UL (ref 4–5.2)
RBC # FLD MANUAL: 2400 /CUMM
REPORT: NORMAL
SODIUM SERPL-SCNC: 152 MMOL/L (ref 136–145)
TUBE # CSF: ABNORMAL
WBC # BLD AUTO: 12.2 K/UL (ref 4–11)

## 2025-01-28 PROCEDURE — 84157 ASSAY OF PROTEIN OTHER: CPT

## 2025-01-28 PROCEDURE — 94761 N-INVAS EAR/PLS OXIMETRY MLT: CPT

## 2025-01-28 PROCEDURE — 94003 VENT MGMT INPAT SUBQ DAY: CPT

## 2025-01-28 PROCEDURE — 95700 EEG CONT REC W/VID EEG TECH: CPT

## 2025-01-28 PROCEDURE — 6360000002 HC RX W HCPCS

## 2025-01-28 PROCEDURE — 87205 SMEAR GRAM STAIN: CPT

## 2025-01-28 PROCEDURE — 83735 ASSAY OF MAGNESIUM: CPT

## 2025-01-28 PROCEDURE — 6370000000 HC RX 637 (ALT 250 FOR IP)

## 2025-01-28 PROCEDURE — 95713 VEEG 2-12 HR CONT MNTR: CPT

## 2025-01-28 PROCEDURE — 82945 GLUCOSE OTHER FLUID: CPT

## 2025-01-28 PROCEDURE — 87483 CNS DNA AMP PROBE TYPE 12-25: CPT

## 2025-01-28 PROCEDURE — 80048 BASIC METABOLIC PNL TOTAL CA: CPT

## 2025-01-28 PROCEDURE — 36415 COLL VENOUS BLD VENIPUNCTURE: CPT

## 2025-01-28 PROCEDURE — 2500000003 HC RX 250 WO HCPCS

## 2025-01-28 PROCEDURE — 89050 BODY FLUID CELL COUNT: CPT

## 2025-01-28 PROCEDURE — 87070 CULTURE OTHR SPECIMN AEROBIC: CPT

## 2025-01-28 PROCEDURE — 2000000000 HC ICU R&B

## 2025-01-28 PROCEDURE — 85027 COMPLETE CBC AUTOMATED: CPT

## 2025-01-28 PROCEDURE — 2700000000 HC OXYGEN THERAPY PER DAY

## 2025-01-28 PROCEDURE — 99291 CRITICAL CARE FIRST HOUR: CPT | Performed by: INTERNAL MEDICINE

## 2025-01-28 PROCEDURE — 84100 ASSAY OF PHOSPHORUS: CPT

## 2025-01-28 PROCEDURE — 2580000003 HC RX 258

## 2025-01-28 PROCEDURE — 99233 SBSQ HOSP IP/OBS HIGH 50: CPT

## 2025-01-28 RX ADMIN — ENALAPRILAT 1.25 MG: 2.5 INJECTION INTRAVENOUS at 23:38

## 2025-01-28 RX ADMIN — Medication: at 20:04

## 2025-01-28 RX ADMIN — CARVEDILOL 12.5 MG: 6.25 TABLET, FILM COATED ORAL at 20:05

## 2025-01-28 RX ADMIN — VANCOMYCIN HYDROCHLORIDE 1250 MG: 1.25 INJECTION, SOLUTION INTRAVITREAL at 08:55

## 2025-01-28 RX ADMIN — WATER 1000 MG: 1 INJECTION INTRAMUSCULAR; INTRAVENOUS; SUBCUTANEOUS at 12:19

## 2025-01-28 RX ADMIN — ENALAPRILAT 1.25 MG: 2.5 INJECTION INTRAVENOUS at 00:07

## 2025-01-28 RX ADMIN — HEPARIN SODIUM 5000 UNITS: 5000 INJECTION INTRAVENOUS; SUBCUTANEOUS at 14:04

## 2025-01-28 RX ADMIN — HYDRALAZINE HYDROCHLORIDE 5 MG: 20 INJECTION INTRAMUSCULAR; INTRAVENOUS at 07:40

## 2025-01-28 RX ADMIN — Medication: at 07:40

## 2025-01-28 RX ADMIN — SODIUM CHLORIDE, PRESERVATIVE FREE 10 ML: 5 INJECTION INTRAVENOUS at 07:39

## 2025-01-28 RX ADMIN — HEPARIN SODIUM 5000 UNITS: 5000 INJECTION INTRAVENOUS; SUBCUTANEOUS at 05:31

## 2025-01-28 RX ADMIN — SODIUM CHLORIDE, PRESERVATIVE FREE 10 ML: 5 INJECTION INTRAVENOUS at 20:05

## 2025-01-28 RX ADMIN — LABETALOL HYDROCHLORIDE 10 MG: 5 INJECTION, SOLUTION INTRAVENOUS at 04:35

## 2025-01-28 RX ADMIN — ENALAPRILAT 1.25 MG: 2.5 INJECTION INTRAVENOUS at 05:31

## 2025-01-28 RX ADMIN — LABETALOL HYDROCHLORIDE 10 MG: 5 INJECTION, SOLUTION INTRAVENOUS at 14:05

## 2025-01-28 RX ADMIN — ENALAPRILAT 1.25 MG: 2.5 INJECTION INTRAVENOUS at 12:19

## 2025-01-28 RX ADMIN — SENNOSIDES 8.6 MG: 8.6 TABLET, COATED ORAL at 20:05

## 2025-01-28 RX ADMIN — ENALAPRILAT 1.25 MG: 2.5 INJECTION INTRAVENOUS at 17:44

## 2025-01-28 RX ADMIN — SODIUM CHLORIDE, PRESERVATIVE FREE 40 MG: 5 INJECTION INTRAVENOUS at 07:39

## 2025-01-28 RX ADMIN — HEPARIN SODIUM 5000 UNITS: 5000 INJECTION INTRAVENOUS; SUBCUTANEOUS at 21:40

## 2025-01-28 RX ADMIN — CARVEDILOL 12.5 MG: 6.25 TABLET, FILM COATED ORAL at 07:40

## 2025-01-28 ASSESSMENT — PULMONARY FUNCTION TESTS
PIF_VALUE: 16
PIF_VALUE: 19
PIF_VALUE: 20
PIF_VALUE: 18
PIF_VALUE: 15
PIF_VALUE: 21
PIF_VALUE: 19
PIF_VALUE: 20
PIF_VALUE: 19
PIF_VALUE: 17
PIF_VALUE: 16
PIF_VALUE: 19
PIF_VALUE: 15
PIF_VALUE: 21
PIF_VALUE: 15
PIF_VALUE: 20
PIF_VALUE: 20
PIF_VALUE: 17
PIF_VALUE: 21
PIF_VALUE: 15
PIF_VALUE: 17
PIF_VALUE: 16
PIF_VALUE: 18
PIF_VALUE: 17
PIF_VALUE: 19
PIF_VALUE: 19
PIF_VALUE: 15
PIF_VALUE: 20
PIF_VALUE: 18
PIF_VALUE: 16
PIF_VALUE: 19
PIF_VALUE: 16
PIF_VALUE: 15
PIF_VALUE: 20
PIF_VALUE: 17
PIF_VALUE: 19
PIF_VALUE: 18
PIF_VALUE: 16
PIF_VALUE: 35
PIF_VALUE: 16
PIF_VALUE: 16
PIF_VALUE: 17
PIF_VALUE: 18
PIF_VALUE: 15
PIF_VALUE: 25
PIF_VALUE: 17
PIF_VALUE: 16
PIF_VALUE: 16
PIF_VALUE: 20
PIF_VALUE: 15
PIF_VALUE: 20
PIF_VALUE: 19
PIF_VALUE: 18
PIF_VALUE: 17
PIF_VALUE: 20
PIF_VALUE: 15
PIF_VALUE: 18

## 2025-01-28 ASSESSMENT — PAIN SCALES - GENERAL
PAINLEVEL_OUTOF10: 0

## 2025-01-28 NOTE — CARE COORDINATION
DISCHARGE PLANNING:  Chart reviewed.  Patient is from home with her . No current services.     Currently she is intubated with no sedation. Restraints in place.  EVD replaced yesterday by Neurosurgery.    Current discharge plan is TBD pending medical course.  [facility] is following.    CM team will continue to follow.  Janet Kumar, RN Case Manager  [phone]

## 2025-01-29 LAB
ANION GAP SERPL CALCULATED.3IONS-SCNC: 9 MMOL/L (ref 3–16)
BASE EXCESS BLDA CALC-SCNC: 8 MMOL/L (ref -3–3)
BUN SERPL-MCNC: 27 MG/DL (ref 7–20)
CALCIUM SERPL-MCNC: 10 MG/DL (ref 8.3–10.6)
CHLORIDE SERPL-SCNC: 113 MMOL/L (ref 99–110)
CO2 BLDA-SCNC: 32 MMOL/L
CO2 SERPL-SCNC: 31 MMOL/L (ref 21–32)
CREAT SERPL-MCNC: 0.7 MG/DL (ref 0.6–1.2)
DEPRECATED RDW RBC AUTO: 13.7 % (ref 12.4–15.4)
GFR SERPLBLD CREATININE-BSD FMLA CKD-EPI: 89 ML/MIN/{1.73_M2}
GLUCOSE SERPL-MCNC: 134 MG/DL (ref 70–99)
HCO3 BLDA-SCNC: 30.9 MMOL/L (ref 21–29)
HCT VFR BLD AUTO: 34.9 % (ref 36–48)
HGB BLD-MCNC: 11.7 G/DL (ref 12–16)
MAGNESIUM SERPL-MCNC: 2.34 MG/DL (ref 1.8–2.4)
MCH RBC QN AUTO: 35.1 PG (ref 26–34)
MCHC RBC AUTO-ENTMCNC: 33.7 G/DL (ref 31–36)
MCV RBC AUTO: 104.4 FL (ref 80–100)
PCO2 BLDA: 37.6 MM HG (ref 35–45)
PERFORMED ON: ABNORMAL
PH BLDA: 7.52 [PH] (ref 7.35–7.45)
PHOSPHATE SERPL-MCNC: 3 MG/DL (ref 2.5–4.9)
PLATELET # BLD AUTO: 274 K/UL (ref 135–450)
PMV BLD AUTO: 7.1 FL (ref 5–10.5)
PO2 BLDA: 96.1 MM HG (ref 75–108)
POC SAMPLE TYPE: ABNORMAL
POTASSIUM SERPL-SCNC: 3.6 MMOL/L (ref 3.5–5.1)
RBC # BLD AUTO: 3.34 M/UL (ref 4–5.2)
SAO2 % BLDA: 98 % (ref 93–100)
SODIUM SERPL-SCNC: 153 MMOL/L (ref 136–145)
WBC # BLD AUTO: 10 K/UL (ref 4–11)

## 2025-01-29 PROCEDURE — 6360000002 HC RX W HCPCS

## 2025-01-29 PROCEDURE — 2700000000 HC OXYGEN THERAPY PER DAY

## 2025-01-29 PROCEDURE — 95718 EEG PHYS/QHP 2-12 HR W/VEEG: CPT | Performed by: PSYCHIATRY & NEUROLOGY

## 2025-01-29 PROCEDURE — 36415 COLL VENOUS BLD VENIPUNCTURE: CPT

## 2025-01-29 PROCEDURE — 6370000000 HC RX 637 (ALT 250 FOR IP)

## 2025-01-29 PROCEDURE — 85027 COMPLETE CBC AUTOMATED: CPT

## 2025-01-29 PROCEDURE — 94003 VENT MGMT INPAT SUBQ DAY: CPT

## 2025-01-29 PROCEDURE — 82803 BLOOD GASES ANY COMBINATION: CPT

## 2025-01-29 PROCEDURE — 95720 EEG PHY/QHP EA INCR W/VEEG: CPT | Performed by: PSYCHIATRY & NEUROLOGY

## 2025-01-29 PROCEDURE — 82746 ASSAY OF FOLIC ACID SERUM: CPT

## 2025-01-29 PROCEDURE — 95716 VEEG EA 12-26HR CONT MNTR: CPT

## 2025-01-29 PROCEDURE — 2500000003 HC RX 250 WO HCPCS

## 2025-01-29 PROCEDURE — 82607 VITAMIN B-12: CPT

## 2025-01-29 PROCEDURE — 80048 BASIC METABOLIC PNL TOTAL CA: CPT

## 2025-01-29 PROCEDURE — 2000000000 HC ICU R&B

## 2025-01-29 PROCEDURE — 84100 ASSAY OF PHOSPHORUS: CPT

## 2025-01-29 PROCEDURE — 99291 CRITICAL CARE FIRST HOUR: CPT | Performed by: INTERNAL MEDICINE

## 2025-01-29 PROCEDURE — 94761 N-INVAS EAR/PLS OXIMETRY MLT: CPT

## 2025-01-29 PROCEDURE — 2580000003 HC RX 258

## 2025-01-29 PROCEDURE — 83735 ASSAY OF MAGNESIUM: CPT

## 2025-01-29 PROCEDURE — 6370000000 HC RX 637 (ALT 250 FOR IP): Performed by: STUDENT IN AN ORGANIZED HEALTH CARE EDUCATION/TRAINING PROGRAM

## 2025-01-29 RX ORDER — MULTIVITAMIN WITH IRON
1 TABLET ORAL DAILY
Status: DISCONTINUED | OUTPATIENT
Start: 2025-01-29 | End: 2025-01-29 | Stop reason: SDUPTHER

## 2025-01-29 RX ORDER — MULTIVIT-MIN/FERROUS GLUCONATE 9 MG/15 ML
15 LIQUID (ML) ORAL DAILY
Status: DISCONTINUED | OUTPATIENT
Start: 2025-01-29 | End: 2025-02-10

## 2025-01-29 RX ORDER — LISINOPRIL 5 MG/1
5 TABLET ORAL DAILY
Status: DISCONTINUED | OUTPATIENT
Start: 2025-01-29 | End: 2025-01-30

## 2025-01-29 RX ORDER — MODAFINIL 100 MG/1
100 TABLET ORAL DAILY
Status: DISCONTINUED | OUTPATIENT
Start: 2025-01-29 | End: 2025-01-31

## 2025-01-29 RX ORDER — FOLIC ACID 1 MG/1
1 TABLET ORAL DAILY
Status: DISCONTINUED | OUTPATIENT
Start: 2025-01-29 | End: 2025-02-10

## 2025-01-29 RX ADMIN — HEPARIN SODIUM 5000 UNITS: 5000 INJECTION INTRAVENOUS; SUBCUTANEOUS at 13:44

## 2025-01-29 RX ADMIN — LABETALOL HYDROCHLORIDE 10 MG: 5 INJECTION, SOLUTION INTRAVENOUS at 18:04

## 2025-01-29 RX ADMIN — ENALAPRILAT 1.25 MG: 2.5 INJECTION INTRAVENOUS at 05:31

## 2025-01-29 RX ADMIN — HEPARIN SODIUM 5000 UNITS: 5000 INJECTION INTRAVENOUS; SUBCUTANEOUS at 21:55

## 2025-01-29 RX ADMIN — NICARDIPINE HYDROCHLORIDE 5 MG/HR: 0.1 INJECTION INTRAVENOUS at 20:18

## 2025-01-29 RX ADMIN — SODIUM CHLORIDE, PRESERVATIVE FREE 40 MG: 5 INJECTION INTRAVENOUS at 08:27

## 2025-01-29 RX ADMIN — CARVEDILOL 12.5 MG: 6.25 TABLET, FILM COATED ORAL at 08:27

## 2025-01-29 RX ADMIN — SODIUM CHLORIDE, PRESERVATIVE FREE 10 ML: 5 INJECTION INTRAVENOUS at 08:28

## 2025-01-29 RX ADMIN — LISINOPRIL 5 MG: 5 TABLET ORAL at 13:44

## 2025-01-29 RX ADMIN — HYDRALAZINE HYDROCHLORIDE 5 MG: 20 INJECTION INTRAMUSCULAR; INTRAVENOUS at 03:42

## 2025-01-29 RX ADMIN — SODIUM CHLORIDE, PRESERVATIVE FREE 10 ML: 5 INJECTION INTRAVENOUS at 20:29

## 2025-01-29 RX ADMIN — Medication: at 20:28

## 2025-01-29 RX ADMIN — Medication: at 08:36

## 2025-01-29 RX ADMIN — HYDRALAZINE HYDROCHLORIDE 5 MG: 20 INJECTION INTRAMUSCULAR; INTRAVENOUS at 08:27

## 2025-01-29 RX ADMIN — NICARDIPINE HYDROCHLORIDE 2.5 MG/HR: 0.1 INJECTION INTRAVENOUS at 22:58

## 2025-01-29 RX ADMIN — HEPARIN SODIUM 5000 UNITS: 5000 INJECTION INTRAVENOUS; SUBCUTANEOUS at 05:31

## 2025-01-29 RX ADMIN — MODAFINIL 100 MG: 100 TABLET ORAL at 13:44

## 2025-01-29 RX ADMIN — LABETALOL HYDROCHLORIDE 10 MG: 5 INJECTION, SOLUTION INTRAVENOUS at 01:47

## 2025-01-29 RX ADMIN — FOLIC ACID 1 MG: 1 TABLET ORAL at 13:44

## 2025-01-29 RX ADMIN — WATER 1000 MG: 1 INJECTION INTRAMUSCULAR; INTRAVENOUS; SUBCUTANEOUS at 13:44

## 2025-01-29 RX ADMIN — Medication 15 ML: at 17:27

## 2025-01-29 RX ADMIN — SENNOSIDES 8.6 MG: 8.6 TABLET, COATED ORAL at 21:55

## 2025-01-29 RX ADMIN — NICARDIPINE HYDROCHLORIDE 5 MG/HR: 0.1 INJECTION INTRAVENOUS at 08:36

## 2025-01-29 ASSESSMENT — PULMONARY FUNCTION TESTS
PIF_VALUE: 16
PIF_VALUE: 15
PIF_VALUE: 19
PIF_VALUE: 16
PIF_VALUE: 30
PIF_VALUE: 22
PIF_VALUE: 16
PIF_VALUE: 17
PIF_VALUE: 20
PIF_VALUE: 15
PIF_VALUE: 15
PIF_VALUE: 9
PIF_VALUE: 17
PIF_VALUE: 23
PIF_VALUE: 20
PIF_VALUE: 17
PIF_VALUE: 15
PIF_VALUE: 25
PIF_VALUE: 15
PIF_VALUE: 16
PIF_VALUE: 22
PIF_VALUE: 15
PIF_VALUE: 15
PIF_VALUE: 16
PIF_VALUE: 18
PIF_VALUE: 22
PIF_VALUE: 23
PIF_VALUE: 22
PIF_VALUE: 16
PIF_VALUE: 15
PIF_VALUE: 16
PIF_VALUE: 18
PIF_VALUE: 15
PIF_VALUE: 16
PIF_VALUE: 24
PIF_VALUE: 15

## 2025-01-29 ASSESSMENT — PAIN SCALES - GENERAL
PAINLEVEL_OUTOF10: 0

## 2025-01-30 LAB
ANION GAP SERPL CALCULATED.3IONS-SCNC: 10 MMOL/L (ref 3–16)
BUN SERPL-MCNC: 24 MG/DL (ref 7–20)
CALCIUM SERPL-MCNC: 9.5 MG/DL (ref 8.3–10.6)
CHLORIDE SERPL-SCNC: 114 MMOL/L (ref 99–110)
CO2 SERPL-SCNC: 29 MMOL/L (ref 21–32)
CREAT SERPL-MCNC: 0.6 MG/DL (ref 0.6–1.2)
DEPRECATED RDW RBC AUTO: 13.8 % (ref 12.4–15.4)
FOLATE SERPL-MCNC: 6.96 NG/ML (ref 4.78–24.2)
GFR SERPLBLD CREATININE-BSD FMLA CKD-EPI: >90 ML/MIN/{1.73_M2}
GLUCOSE SERPL-MCNC: 163 MG/DL (ref 70–99)
HCT VFR BLD AUTO: 33.7 % (ref 36–48)
HGB BLD-MCNC: 11.2 G/DL (ref 12–16)
MAGNESIUM SERPL-MCNC: 2.23 MG/DL (ref 1.8–2.4)
MCH RBC QN AUTO: 34.8 PG (ref 26–34)
MCHC RBC AUTO-ENTMCNC: 33.2 G/DL (ref 31–36)
MCV RBC AUTO: 104.8 FL (ref 80–100)
PHOSPHATE SERPL-MCNC: 2.5 MG/DL (ref 2.5–4.9)
PLATELET # BLD AUTO: 276 K/UL (ref 135–450)
PMV BLD AUTO: 7.5 FL (ref 5–10.5)
POTASSIUM SERPL-SCNC: 3.2 MMOL/L (ref 3.5–5.1)
RBC # BLD AUTO: 3.21 M/UL (ref 4–5.2)
SODIUM SERPL-SCNC: 153 MMOL/L (ref 136–145)
VIT B12 SERPL-MCNC: 407 PG/ML (ref 211–911)
WBC # BLD AUTO: 10 K/UL (ref 4–11)

## 2025-01-30 PROCEDURE — 6370000000 HC RX 637 (ALT 250 FOR IP): Performed by: STUDENT IN AN ORGANIZED HEALTH CARE EDUCATION/TRAINING PROGRAM

## 2025-01-30 PROCEDURE — 2700000000 HC OXYGEN THERAPY PER DAY

## 2025-01-30 PROCEDURE — 2500000003 HC RX 250 WO HCPCS

## 2025-01-30 PROCEDURE — 80048 BASIC METABOLIC PNL TOTAL CA: CPT

## 2025-01-30 PROCEDURE — 99232 SBSQ HOSP IP/OBS MODERATE 35: CPT

## 2025-01-30 PROCEDURE — 6370000000 HC RX 637 (ALT 250 FOR IP)

## 2025-01-30 PROCEDURE — 51798 US URINE CAPACITY MEASURE: CPT

## 2025-01-30 PROCEDURE — 94003 VENT MGMT INPAT SUBQ DAY: CPT

## 2025-01-30 PROCEDURE — 6360000002 HC RX W HCPCS

## 2025-01-30 PROCEDURE — 2000000000 HC ICU R&B

## 2025-01-30 PROCEDURE — 84100 ASSAY OF PHOSPHORUS: CPT

## 2025-01-30 PROCEDURE — 83735 ASSAY OF MAGNESIUM: CPT

## 2025-01-30 PROCEDURE — 2580000003 HC RX 258

## 2025-01-30 PROCEDURE — 94761 N-INVAS EAR/PLS OXIMETRY MLT: CPT

## 2025-01-30 PROCEDURE — 99291 CRITICAL CARE FIRST HOUR: CPT | Performed by: INTERNAL MEDICINE

## 2025-01-30 PROCEDURE — 36415 COLL VENOUS BLD VENIPUNCTURE: CPT

## 2025-01-30 PROCEDURE — 85027 COMPLETE CBC AUTOMATED: CPT

## 2025-01-30 RX ORDER — LISINOPRIL 10 MG/1
10 TABLET ORAL DAILY
Status: DISCONTINUED | OUTPATIENT
Start: 2025-01-31 | End: 2025-02-01

## 2025-01-30 RX ADMIN — NICARDIPINE HYDROCHLORIDE 2.5 MG/HR: 0.1 INJECTION INTRAVENOUS at 06:46

## 2025-01-30 RX ADMIN — LISINOPRIL 5 MG: 5 TABLET ORAL at 07:56

## 2025-01-30 RX ADMIN — SENNOSIDES 8.6 MG: 8.6 TABLET, COATED ORAL at 20:09

## 2025-01-30 RX ADMIN — HEPARIN SODIUM 5000 UNITS: 5000 INJECTION INTRAVENOUS; SUBCUTANEOUS at 13:36

## 2025-01-30 RX ADMIN — MODAFINIL 100 MG: 100 TABLET ORAL at 07:54

## 2025-01-30 RX ADMIN — Medication: at 07:57

## 2025-01-30 RX ADMIN — FOLIC ACID 1 MG: 1 TABLET ORAL at 07:56

## 2025-01-30 RX ADMIN — Medication 15 ML: at 08:58

## 2025-01-30 RX ADMIN — SODIUM CHLORIDE, PRESERVATIVE FREE 40 MG: 5 INJECTION INTRAVENOUS at 07:54

## 2025-01-30 RX ADMIN — Medication: at 20:02

## 2025-01-30 RX ADMIN — HEPARIN SODIUM 5000 UNITS: 5000 INJECTION INTRAVENOUS; SUBCUTANEOUS at 06:47

## 2025-01-30 RX ADMIN — SODIUM CHLORIDE, PRESERVATIVE FREE 10 ML: 5 INJECTION INTRAVENOUS at 20:02

## 2025-01-30 RX ADMIN — SODIUM CHLORIDE, PRESERVATIVE FREE 10 ML: 5 INJECTION INTRAVENOUS at 08:00

## 2025-01-30 RX ADMIN — HEPARIN SODIUM 5000 UNITS: 5000 INJECTION INTRAVENOUS; SUBCUTANEOUS at 21:46

## 2025-01-30 RX ADMIN — POTASSIUM BICARBONATE 40 MEQ: 782 TABLET, EFFERVESCENT ORAL at 08:58

## 2025-01-30 ASSESSMENT — PULMONARY FUNCTION TESTS
PIF_VALUE: 19
PIF_VALUE: 24
PIF_VALUE: 23
PIF_VALUE: 19
PIF_VALUE: 26
PIF_VALUE: 19
PIF_VALUE: 20
PIF_VALUE: 24
PIF_VALUE: 18
PIF_VALUE: 19
PIF_VALUE: 22
PIF_VALUE: 18
PIF_VALUE: 19
PIF_VALUE: 18
PIF_VALUE: 19
PIF_VALUE: 27
PIF_VALUE: 21
PIF_VALUE: 22
PIF_VALUE: 17
PIF_VALUE: 19
PIF_VALUE: 26
PIF_VALUE: 17
PIF_VALUE: 21
PIF_VALUE: 16
PIF_VALUE: 28
PIF_VALUE: 28
PIF_VALUE: 25
PIF_VALUE: 28
PIF_VALUE: 17

## 2025-01-30 NOTE — CARE COORDINATION
[facility] following. Patient from home with spouse. Consult placed for wound eval right heel. POD#8. Remains intubated, vent, and restraints. CM will continue to follow patient until discharge.  Electronically signed by Kinjal Guillen RN on 1/30/2025 at 5:03 PM

## 2025-01-31 LAB
ALBUMIN SERPL-MCNC: 3.2 G/DL (ref 3.4–5)
ANION GAP SERPL CALCULATED.3IONS-SCNC: 9 MMOL/L (ref 3–16)
BASOPHILS # BLD: 0 K/UL (ref 0–0.2)
BASOPHILS NFR BLD: 0.2 %
BUN SERPL-MCNC: 27 MG/DL (ref 7–20)
CALCIUM SERPL-MCNC: 9.4 MG/DL (ref 8.3–10.6)
CHLORIDE SERPL-SCNC: 111 MMOL/L (ref 99–110)
CO2 SERPL-SCNC: 31 MMOL/L (ref 21–32)
CREAT SERPL-MCNC: 0.6 MG/DL (ref 0.6–1.2)
DEPRECATED RDW RBC AUTO: 13.3 % (ref 12.4–15.4)
EOSINOPHIL # BLD: 0.2 K/UL (ref 0–0.6)
EOSINOPHIL NFR BLD: 1.6 %
GFR SERPLBLD CREATININE-BSD FMLA CKD-EPI: >90 ML/MIN/{1.73_M2}
GLUCOSE SERPL-MCNC: 145 MG/DL (ref 70–99)
HCT VFR BLD AUTO: 32.2 % (ref 36–48)
HGB BLD-MCNC: 10.7 G/DL (ref 12–16)
LYMPHOCYTES # BLD: 1.9 K/UL (ref 1–5.1)
LYMPHOCYTES NFR BLD: 16.5 %
MAGNESIUM SERPL-MCNC: 2.26 MG/DL (ref 1.8–2.4)
MCH RBC QN AUTO: 34.9 PG (ref 26–34)
MCHC RBC AUTO-ENTMCNC: 33.4 G/DL (ref 31–36)
MCV RBC AUTO: 104.4 FL (ref 80–100)
MONOCYTES # BLD: 1.1 K/UL (ref 0–1.3)
MONOCYTES NFR BLD: 9.5 %
NEUTROPHILS # BLD: 8.2 K/UL (ref 1.7–7.7)
NEUTROPHILS NFR BLD: 72.2 %
PHOSPHATE SERPL-MCNC: 2.7 MG/DL (ref 2.5–4.9)
PLATELET # BLD AUTO: 287 K/UL (ref 135–450)
PMV BLD AUTO: 7.3 FL (ref 5–10.5)
POTASSIUM SERPL-SCNC: 3.5 MMOL/L (ref 3.5–5.1)
RBC # BLD AUTO: 3.08 M/UL (ref 4–5.2)
SODIUM SERPL-SCNC: 151 MMOL/L (ref 136–145)
WBC # BLD AUTO: 11.4 K/UL (ref 4–11)

## 2025-01-31 PROCEDURE — 99291 CRITICAL CARE FIRST HOUR: CPT

## 2025-01-31 PROCEDURE — 99291 CRITICAL CARE FIRST HOUR: CPT | Performed by: INTERNAL MEDICINE

## 2025-01-31 PROCEDURE — 6360000002 HC RX W HCPCS

## 2025-01-31 PROCEDURE — 2500000003 HC RX 250 WO HCPCS

## 2025-01-31 PROCEDURE — 6370000000 HC RX 637 (ALT 250 FOR IP): Performed by: STUDENT IN AN ORGANIZED HEALTH CARE EDUCATION/TRAINING PROGRAM

## 2025-01-31 PROCEDURE — 87070 CULTURE OTHR SPECIMN AEROBIC: CPT

## 2025-01-31 PROCEDURE — 2700000000 HC OXYGEN THERAPY PER DAY

## 2025-01-31 PROCEDURE — 51798 US URINE CAPACITY MEASURE: CPT

## 2025-01-31 PROCEDURE — 2000000000 HC ICU R&B

## 2025-01-31 PROCEDURE — 89220 SPUTUM SPECIMEN COLLECTION: CPT

## 2025-01-31 PROCEDURE — 87205 SMEAR GRAM STAIN: CPT

## 2025-01-31 PROCEDURE — 85025 COMPLETE CBC W/AUTO DIFF WBC: CPT

## 2025-01-31 PROCEDURE — 2580000003 HC RX 258

## 2025-01-31 PROCEDURE — 6370000000 HC RX 637 (ALT 250 FOR IP)

## 2025-01-31 PROCEDURE — 94003 VENT MGMT INPAT SUBQ DAY: CPT

## 2025-01-31 PROCEDURE — 80069 RENAL FUNCTION PANEL: CPT

## 2025-01-31 PROCEDURE — 36415 COLL VENOUS BLD VENIPUNCTURE: CPT

## 2025-01-31 PROCEDURE — APPNB45 APP NON BILLABLE 31-45 MINUTES: Performed by: NURSE PRACTITIONER

## 2025-01-31 PROCEDURE — 83735 ASSAY OF MAGNESIUM: CPT

## 2025-01-31 PROCEDURE — 51701 INSERT BLADDER CATHETER: CPT

## 2025-01-31 PROCEDURE — 99024 POSTOP FOLLOW-UP VISIT: CPT | Performed by: NURSE PRACTITIONER

## 2025-01-31 PROCEDURE — 94761 N-INVAS EAR/PLS OXIMETRY MLT: CPT

## 2025-01-31 RX ORDER — POLYETHYLENE GLYCOL 3350 17 G/17G
17 POWDER, FOR SOLUTION ORAL 2 TIMES DAILY
Status: DISCONTINUED | OUTPATIENT
Start: 2025-01-31 | End: 2025-02-12 | Stop reason: HOSPADM

## 2025-01-31 RX ORDER — MODAFINIL 100 MG/1
200 TABLET ORAL DAILY
Status: DISCONTINUED | OUTPATIENT
Start: 2025-02-01 | End: 2025-02-07

## 2025-01-31 RX ORDER — GLUCAGON 1 MG/ML
1 KIT INJECTION PRN
Status: DISCONTINUED | OUTPATIENT
Start: 2025-01-31 | End: 2025-02-12 | Stop reason: HOSPADM

## 2025-01-31 RX ORDER — SENNOSIDES A AND B 8.6 MG/1
2 TABLET, FILM COATED ORAL 2 TIMES DAILY
Status: DISCONTINUED | OUTPATIENT
Start: 2025-01-31 | End: 2025-02-12 | Stop reason: HOSPADM

## 2025-01-31 RX ORDER — DEXTROSE MONOHYDRATE 100 MG/ML
INJECTION, SOLUTION INTRAVENOUS CONTINUOUS PRN
Status: DISCONTINUED | OUTPATIENT
Start: 2025-01-31 | End: 2025-02-12 | Stop reason: HOSPADM

## 2025-01-31 RX ADMIN — HYDRALAZINE HYDROCHLORIDE 5 MG: 20 INJECTION INTRAMUSCULAR; INTRAVENOUS at 04:54

## 2025-01-31 RX ADMIN — FOLIC ACID 1 MG: 1 TABLET ORAL at 08:20

## 2025-01-31 RX ADMIN — LISINOPRIL 10 MG: 10 TABLET ORAL at 08:19

## 2025-01-31 RX ADMIN — SODIUM CHLORIDE, PRESERVATIVE FREE 10 ML: 5 INJECTION INTRAVENOUS at 20:03

## 2025-01-31 RX ADMIN — HEPARIN SODIUM 5000 UNITS: 5000 INJECTION INTRAVENOUS; SUBCUTANEOUS at 14:16

## 2025-01-31 RX ADMIN — HEPARIN SODIUM 5000 UNITS: 5000 INJECTION INTRAVENOUS; SUBCUTANEOUS at 22:36

## 2025-01-31 RX ADMIN — Medication: at 20:03

## 2025-01-31 RX ADMIN — SODIUM CHLORIDE, PRESERVATIVE FREE 40 MG: 5 INJECTION INTRAVENOUS at 08:20

## 2025-01-31 RX ADMIN — HEPARIN SODIUM 5000 UNITS: 5000 INJECTION INTRAVENOUS; SUBCUTANEOUS at 06:17

## 2025-01-31 RX ADMIN — SENNOSIDES 17.2 MG: 8.6 TABLET, COATED ORAL at 08:19

## 2025-01-31 RX ADMIN — MODAFINIL 100 MG: 100 TABLET ORAL at 08:19

## 2025-01-31 RX ADMIN — POLYETHYLENE GLYCOL 3350 17 G: 17 POWDER, FOR SOLUTION ORAL at 08:21

## 2025-01-31 RX ADMIN — Medication 15 ML: at 08:21

## 2025-01-31 RX ADMIN — Medication: at 08:37

## 2025-01-31 RX ADMIN — SODIUM CHLORIDE, PRESERVATIVE FREE 10 ML: 5 INJECTION INTRAVENOUS at 08:37

## 2025-01-31 RX ADMIN — SENNOSIDES 17.2 MG: 8.6 TABLET, COATED ORAL at 20:06

## 2025-01-31 RX ADMIN — POLYETHYLENE GLYCOL 3350 17 G: 17 POWDER, FOR SOLUTION ORAL at 20:03

## 2025-01-31 ASSESSMENT — PULMONARY FUNCTION TESTS
PIF_VALUE: 16
PIF_VALUE: 18
PIF_VALUE: 22
PIF_VALUE: 13
PIF_VALUE: 20
PIF_VALUE: 19
PIF_VALUE: 13
PIF_VALUE: 28
PIF_VALUE: 18
PIF_VALUE: 19
PIF_VALUE: 24
PIF_VALUE: 27
PIF_VALUE: 26
PIF_VALUE: 23
PIF_VALUE: 27
PIF_VALUE: 13
PIF_VALUE: 21
PIF_VALUE: 22
PIF_VALUE: 13
PIF_VALUE: 29
PIF_VALUE: 18
PIF_VALUE: 24
PIF_VALUE: 35
PIF_VALUE: 13
PIF_VALUE: 10
PIF_VALUE: 21
PIF_VALUE: 13
PIF_VALUE: 26
PIF_VALUE: 18
PIF_VALUE: 19
PIF_VALUE: 13
PIF_VALUE: 22
PIF_VALUE: 24
PIF_VALUE: 18
PIF_VALUE: 21
PIF_VALUE: 21
PIF_VALUE: 24
PIF_VALUE: 20
PIF_VALUE: 19
PIF_VALUE: 17
PIF_VALUE: 32
PIF_VALUE: 19
PIF_VALUE: 20
PIF_VALUE: 19
PIF_VALUE: 21
PIF_VALUE: 26
PIF_VALUE: 17
PIF_VALUE: 13
PIF_VALUE: 20
PIF_VALUE: 31
PIF_VALUE: 18
PIF_VALUE: 19
PIF_VALUE: 21
PIF_VALUE: 25
PIF_VALUE: 13
PIF_VALUE: 21
PIF_VALUE: 26
PIF_VALUE: 19
PIF_VALUE: 29
PIF_VALUE: 23
PIF_VALUE: 32
PIF_VALUE: 22

## 2025-01-31 NOTE — CARE COORDINATION
Patient remains on vent. Patient is scheduled to have a tracheostomy on Monday. CM will continue to follow patient until discharge.  Electronically signed by Kinjal Guillen RN on 1/31/2025 at 5:59 PM

## 2025-02-01 ENCOUNTER — APPOINTMENT (OUTPATIENT)
Dept: GENERAL RADIOLOGY | Age: 80
DRG: 003 | End: 2025-02-01
Payer: MEDICARE

## 2025-02-01 LAB
ALBUMIN SERPL-MCNC: 3 G/DL (ref 3.4–5)
ANION GAP SERPL CALCULATED.3IONS-SCNC: 9 MMOL/L (ref 3–16)
BASOPHILS # BLD: 0 K/UL (ref 0–0.2)
BASOPHILS NFR BLD: 0.1 %
BUN SERPL-MCNC: 24 MG/DL (ref 7–20)
CALCIUM SERPL-MCNC: 9.1 MG/DL (ref 8.3–10.6)
CHLORIDE SERPL-SCNC: 111 MMOL/L (ref 99–110)
CO2 SERPL-SCNC: 27 MMOL/L (ref 21–32)
CREAT SERPL-MCNC: 0.5 MG/DL (ref 0.6–1.2)
DEPRECATED RDW RBC AUTO: 13.3 % (ref 12.4–15.4)
EOSINOPHIL # BLD: 0.1 K/UL (ref 0–0.6)
EOSINOPHIL NFR BLD: 1.1 %
GFR SERPLBLD CREATININE-BSD FMLA CKD-EPI: >90 ML/MIN/{1.73_M2}
GLUCOSE SERPL-MCNC: 134 MG/DL (ref 70–99)
HCT VFR BLD AUTO: 30.9 % (ref 36–48)
HGB BLD-MCNC: 10.3 G/DL (ref 12–16)
LYMPHOCYTES # BLD: 1.8 K/UL (ref 1–5.1)
LYMPHOCYTES NFR BLD: 15.4 %
MAGNESIUM SERPL-MCNC: 2.21 MG/DL (ref 1.8–2.4)
MCH RBC QN AUTO: 34.9 PG (ref 26–34)
MCHC RBC AUTO-ENTMCNC: 33.4 G/DL (ref 31–36)
MCV RBC AUTO: 104.6 FL (ref 80–100)
MONOCYTES # BLD: 0.8 K/UL (ref 0–1.3)
MONOCYTES NFR BLD: 7 %
NEUTROPHILS # BLD: 9 K/UL (ref 1.7–7.7)
NEUTROPHILS NFR BLD: 76.4 %
PHOSPHATE SERPL-MCNC: 2.4 MG/DL (ref 2.5–4.9)
PLATELET # BLD AUTO: 275 K/UL (ref 135–450)
PMV BLD AUTO: 7.3 FL (ref 5–10.5)
POTASSIUM SERPL-SCNC: 3.1 MMOL/L (ref 3.5–5.1)
RBC # BLD AUTO: 2.96 M/UL (ref 4–5.2)
SODIUM SERPL-SCNC: 147 MMOL/L (ref 136–145)
WBC # BLD AUTO: 11.8 K/UL (ref 4–11)

## 2025-02-01 PROCEDURE — 94761 N-INVAS EAR/PLS OXIMETRY MLT: CPT

## 2025-02-01 PROCEDURE — 85025 COMPLETE CBC W/AUTO DIFF WBC: CPT

## 2025-02-01 PROCEDURE — 83735 ASSAY OF MAGNESIUM: CPT

## 2025-02-01 PROCEDURE — 99291 CRITICAL CARE FIRST HOUR: CPT | Performed by: INTERNAL MEDICINE

## 2025-02-01 PROCEDURE — 2500000003 HC RX 250 WO HCPCS

## 2025-02-01 PROCEDURE — 94003 VENT MGMT INPAT SUBQ DAY: CPT

## 2025-02-01 PROCEDURE — 80069 RENAL FUNCTION PANEL: CPT

## 2025-02-01 PROCEDURE — 99291 CRITICAL CARE FIRST HOUR: CPT

## 2025-02-01 PROCEDURE — 2580000003 HC RX 258

## 2025-02-01 PROCEDURE — 2700000000 HC OXYGEN THERAPY PER DAY

## 2025-02-01 PROCEDURE — 71045 X-RAY EXAM CHEST 1 VIEW: CPT

## 2025-02-01 PROCEDURE — 51701 INSERT BLADDER CATHETER: CPT

## 2025-02-01 PROCEDURE — 51798 US URINE CAPACITY MEASURE: CPT

## 2025-02-01 PROCEDURE — 6360000002 HC RX W HCPCS

## 2025-02-01 PROCEDURE — 6370000000 HC RX 637 (ALT 250 FOR IP)

## 2025-02-01 PROCEDURE — 6360000002 HC RX W HCPCS: Performed by: NURSE PRACTITIONER

## 2025-02-01 PROCEDURE — 36415 COLL VENOUS BLD VENIPUNCTURE: CPT

## 2025-02-01 PROCEDURE — 6370000000 HC RX 637 (ALT 250 FOR IP): Performed by: STUDENT IN AN ORGANIZED HEALTH CARE EDUCATION/TRAINING PROGRAM

## 2025-02-01 PROCEDURE — 2000000000 HC ICU R&B

## 2025-02-01 RX ORDER — LISINOPRIL 20 MG/1
20 TABLET ORAL DAILY
Status: DISCONTINUED | OUTPATIENT
Start: 2025-02-02 | End: 2025-02-02

## 2025-02-01 RX ADMIN — POTASSIUM BICARBONATE 40 MEQ: 782 TABLET, EFFERVESCENT ORAL at 07:54

## 2025-02-01 RX ADMIN — Medication: at 07:54

## 2025-02-01 RX ADMIN — HEPARIN SODIUM 5000 UNITS: 5000 INJECTION INTRAVENOUS; SUBCUTANEOUS at 21:05

## 2025-02-01 RX ADMIN — HEPARIN SODIUM 5000 UNITS: 5000 INJECTION INTRAVENOUS; SUBCUTANEOUS at 05:39

## 2025-02-01 RX ADMIN — HEPARIN SODIUM 5000 UNITS: 5000 INJECTION INTRAVENOUS; SUBCUTANEOUS at 15:18

## 2025-02-01 RX ADMIN — SENNOSIDES 17.2 MG: 8.6 TABLET, COATED ORAL at 07:54

## 2025-02-01 RX ADMIN — POLYETHYLENE GLYCOL 3350 17 G: 17 POWDER, FOR SOLUTION ORAL at 07:54

## 2025-02-01 RX ADMIN — FOLIC ACID 1 MG: 1 TABLET ORAL at 07:54

## 2025-02-01 RX ADMIN — LABETALOL HYDROCHLORIDE 10 MG: 5 INJECTION, SOLUTION INTRAVENOUS at 15:18

## 2025-02-01 RX ADMIN — SODIUM CHLORIDE, PRESERVATIVE FREE 10 ML: 5 INJECTION INTRAVENOUS at 07:54

## 2025-02-01 RX ADMIN — SODIUM CHLORIDE, PRESERVATIVE FREE 40 MG: 5 INJECTION INTRAVENOUS at 07:54

## 2025-02-01 RX ADMIN — Medication 15 ML: at 09:14

## 2025-02-01 RX ADMIN — SODIUM CHLORIDE, PRESERVATIVE FREE 10 ML: 5 INJECTION INTRAVENOUS at 19:40

## 2025-02-01 RX ADMIN — POTASSIUM PHOSPHATE, MONOBASIC AND POTASSIUM PHOSPHATE, DIBASIC 15 MMOL: 224; 236 INJECTION, SOLUTION, CONCENTRATE INTRAVENOUS at 09:14

## 2025-02-01 RX ADMIN — SENNOSIDES 17.2 MG: 8.6 TABLET, COATED ORAL at 19:45

## 2025-02-01 RX ADMIN — Medication: at 19:40

## 2025-02-01 RX ADMIN — MORPHINE SULFATE 2 MG: 2 INJECTION, SOLUTION INTRAMUSCULAR; INTRAVENOUS at 19:02

## 2025-02-01 RX ADMIN — MODAFINIL 200 MG: 100 TABLET ORAL at 07:54

## 2025-02-01 RX ADMIN — LISINOPRIL 10 MG: 10 TABLET ORAL at 07:54

## 2025-02-01 ASSESSMENT — PULMONARY FUNCTION TESTS
PIF_VALUE: 19
PIF_VALUE: 13
PIF_VALUE: 14
PIF_VALUE: 13
PIF_VALUE: 14
PIF_VALUE: 13
PIF_VALUE: 13
PIF_VALUE: 17
PIF_VALUE: 18
PIF_VALUE: 13
PIF_VALUE: 18
PIF_VALUE: 14
PIF_VALUE: 13
PIF_VALUE: 13
PIF_VALUE: 16
PIF_VALUE: 13
PIF_VALUE: 13
PIF_VALUE: 23
PIF_VALUE: 13
PIF_VALUE: 13
PIF_VALUE: 19
PIF_VALUE: 14
PIF_VALUE: 13
PIF_VALUE: 13
PIF_VALUE: 17
PIF_VALUE: 13
PIF_VALUE: 14
PIF_VALUE: 14
PIF_VALUE: 23
PIF_VALUE: 13
PIF_VALUE: 13
PIF_VALUE: 18
PIF_VALUE: 13
PIF_VALUE: 19
PIF_VALUE: 24
PIF_VALUE: 13
PIF_VALUE: 14
PIF_VALUE: 13
PIF_VALUE: 17
PIF_VALUE: 13
PIF_VALUE: 18
PIF_VALUE: 24
PIF_VALUE: 13
PIF_VALUE: 30
PIF_VALUE: 19
PIF_VALUE: 20
PIF_VALUE: 21
PIF_VALUE: 13
PIF_VALUE: 23
PIF_VALUE: 13
PIF_VALUE: 13

## 2025-02-02 LAB
ALBUMIN SERPL-MCNC: 3 G/DL (ref 3.4–5)
ANION GAP SERPL CALCULATED.3IONS-SCNC: 7 MMOL/L (ref 3–16)
BASOPHILS # BLD: 0.1 K/UL (ref 0–0.2)
BASOPHILS NFR BLD: 0.5 %
BUN SERPL-MCNC: 23 MG/DL (ref 7–20)
CALCIUM SERPL-MCNC: 8.7 MG/DL (ref 8.3–10.6)
CHLORIDE SERPL-SCNC: 109 MMOL/L (ref 99–110)
CO2 SERPL-SCNC: 28 MMOL/L (ref 21–32)
CREAT SERPL-MCNC: 0.5 MG/DL (ref 0.6–1.2)
DEPRECATED RDW RBC AUTO: 13.5 % (ref 12.4–15.4)
EOSINOPHIL # BLD: 0.1 K/UL (ref 0–0.6)
EOSINOPHIL NFR BLD: 1.1 %
GFR SERPLBLD CREATININE-BSD FMLA CKD-EPI: >90 ML/MIN/{1.73_M2}
GLUCOSE SERPL-MCNC: 129 MG/DL (ref 70–99)
HCT VFR BLD AUTO: 29.1 % (ref 36–48)
HGB BLD-MCNC: 9.7 G/DL (ref 12–16)
LYMPHOCYTES # BLD: 1.9 K/UL (ref 1–5.1)
LYMPHOCYTES NFR BLD: 14.8 %
MAGNESIUM SERPL-MCNC: 2.19 MG/DL (ref 1.8–2.4)
MCH RBC QN AUTO: 34.8 PG (ref 26–34)
MCHC RBC AUTO-ENTMCNC: 33.2 G/DL (ref 31–36)
MCV RBC AUTO: 104.7 FL (ref 80–100)
MONOCYTES # BLD: 0.9 K/UL (ref 0–1.3)
MONOCYTES NFR BLD: 7.1 %
NEUTROPHILS # BLD: 9.7 K/UL (ref 1.7–7.7)
NEUTROPHILS NFR BLD: 76.5 %
PHOSPHATE SERPL-MCNC: 2.5 MG/DL (ref 2.5–4.9)
PLATELET # BLD AUTO: 268 K/UL (ref 135–450)
PMV BLD AUTO: 7 FL (ref 5–10.5)
POTASSIUM SERPL-SCNC: 3.8 MMOL/L (ref 3.5–5.1)
RBC # BLD AUTO: 2.78 M/UL (ref 4–5.2)
SODIUM SERPL-SCNC: 144 MMOL/L (ref 136–145)
WBC # BLD AUTO: 12.7 K/UL (ref 4–11)

## 2025-02-02 PROCEDURE — 99291 CRITICAL CARE FIRST HOUR: CPT | Performed by: INTERNAL MEDICINE

## 2025-02-02 PROCEDURE — 6370000000 HC RX 637 (ALT 250 FOR IP): Performed by: STUDENT IN AN ORGANIZED HEALTH CARE EDUCATION/TRAINING PROGRAM

## 2025-02-02 PROCEDURE — 6370000000 HC RX 637 (ALT 250 FOR IP)

## 2025-02-02 PROCEDURE — 36415 COLL VENOUS BLD VENIPUNCTURE: CPT

## 2025-02-02 PROCEDURE — 99232 SBSQ HOSP IP/OBS MODERATE 35: CPT | Performed by: NURSE PRACTITIONER

## 2025-02-02 PROCEDURE — 99024 POSTOP FOLLOW-UP VISIT: CPT | Performed by: NURSE PRACTITIONER

## 2025-02-02 PROCEDURE — 2580000003 HC RX 258

## 2025-02-02 PROCEDURE — 6360000002 HC RX W HCPCS

## 2025-02-02 PROCEDURE — 85025 COMPLETE CBC W/AUTO DIFF WBC: CPT

## 2025-02-02 PROCEDURE — 83735 ASSAY OF MAGNESIUM: CPT

## 2025-02-02 PROCEDURE — 51798 US URINE CAPACITY MEASURE: CPT

## 2025-02-02 PROCEDURE — APPNB60 APP NON BILLABLE TIME 46-60 MINS: Performed by: NURSE PRACTITIONER

## 2025-02-02 PROCEDURE — 2500000003 HC RX 250 WO HCPCS

## 2025-02-02 PROCEDURE — 94003 VENT MGMT INPAT SUBQ DAY: CPT

## 2025-02-02 PROCEDURE — 80069 RENAL FUNCTION PANEL: CPT

## 2025-02-02 PROCEDURE — 94761 N-INVAS EAR/PLS OXIMETRY MLT: CPT

## 2025-02-02 PROCEDURE — 2700000000 HC OXYGEN THERAPY PER DAY

## 2025-02-02 PROCEDURE — 51701 INSERT BLADDER CATHETER: CPT

## 2025-02-02 PROCEDURE — 2000000000 HC ICU R&B

## 2025-02-02 RX ORDER — LISINOPRIL 40 MG/1
40 TABLET ORAL DAILY
Status: DISCONTINUED | OUTPATIENT
Start: 2025-02-03 | End: 2025-02-10

## 2025-02-02 RX ADMIN — FOLIC ACID 1 MG: 1 TABLET ORAL at 09:29

## 2025-02-02 RX ADMIN — HEPARIN SODIUM 5000 UNITS: 5000 INJECTION INTRAVENOUS; SUBCUTANEOUS at 05:37

## 2025-02-02 RX ADMIN — HEPARIN SODIUM 5000 UNITS: 5000 INJECTION INTRAVENOUS; SUBCUTANEOUS at 21:34

## 2025-02-02 RX ADMIN — ACETAMINOPHEN 650 MG: 325 TABLET ORAL at 19:54

## 2025-02-02 RX ADMIN — HEPARIN SODIUM 5000 UNITS: 5000 INJECTION INTRAVENOUS; SUBCUTANEOUS at 16:00

## 2025-02-02 RX ADMIN — Medication: at 09:30

## 2025-02-02 RX ADMIN — Medication: at 19:55

## 2025-02-02 RX ADMIN — SODIUM CHLORIDE, PRESERVATIVE FREE 10 ML: 5 INJECTION INTRAVENOUS at 09:30

## 2025-02-02 RX ADMIN — LABETALOL HYDROCHLORIDE 10 MG: 5 INJECTION, SOLUTION INTRAVENOUS at 10:35

## 2025-02-02 RX ADMIN — MODAFINIL 200 MG: 100 TABLET ORAL at 09:30

## 2025-02-02 RX ADMIN — SODIUM CHLORIDE, PRESERVATIVE FREE 40 MG: 5 INJECTION INTRAVENOUS at 09:29

## 2025-02-02 RX ADMIN — SODIUM CHLORIDE, PRESERVATIVE FREE 10 ML: 5 INJECTION INTRAVENOUS at 20:06

## 2025-02-02 RX ADMIN — LISINOPRIL 20 MG: 20 TABLET ORAL at 09:29

## 2025-02-02 RX ADMIN — Medication 15 ML: at 09:30

## 2025-02-02 RX ADMIN — HYDRALAZINE HYDROCHLORIDE 5 MG: 20 INJECTION INTRAMUSCULAR; INTRAVENOUS at 12:39

## 2025-02-02 ASSESSMENT — PULMONARY FUNCTION TESTS
PIF_VALUE: 18
PIF_VALUE: 23
PIF_VALUE: 21
PIF_VALUE: 18
PIF_VALUE: 19
PIF_VALUE: 16
PIF_VALUE: 17
PIF_VALUE: 20
PIF_VALUE: 16
PIF_VALUE: 22
PIF_VALUE: 23
PIF_VALUE: 15
PIF_VALUE: 22
PIF_VALUE: 22
PIF_VALUE: 16
PIF_VALUE: 15
PIF_VALUE: 18
PIF_VALUE: 19
PIF_VALUE: 22
PIF_VALUE: 16
PIF_VALUE: 21
PIF_VALUE: 18
PIF_VALUE: 19
PIF_VALUE: 22
PIF_VALUE: 24
PIF_VALUE: 21
PIF_VALUE: 19
PIF_VALUE: 18
PIF_VALUE: 25
PIF_VALUE: 21
PIF_VALUE: 16
PIF_VALUE: 18
PIF_VALUE: 22
PIF_VALUE: 23
PIF_VALUE: 17
PIF_VALUE: 21
PIF_VALUE: 25
PIF_VALUE: 19
PIF_VALUE: 20
PIF_VALUE: 15
PIF_VALUE: 23
PIF_VALUE: 16
PIF_VALUE: 19
PIF_VALUE: 22
PIF_VALUE: 17
PIF_VALUE: 18
PIF_VALUE: 16
PIF_VALUE: 17
PIF_VALUE: 24
PIF_VALUE: 21
PIF_VALUE: 21
PIF_VALUE: 19
PIF_VALUE: 22
PIF_VALUE: 22
PIF_VALUE: 21
PIF_VALUE: 15
PIF_VALUE: 23
PIF_VALUE: 23
PIF_VALUE: 18
PIF_VALUE: 18
PIF_VALUE: 21
PIF_VALUE: 17
PIF_VALUE: 21
PIF_VALUE: 23
PIF_VALUE: 18
PIF_VALUE: 23
PIF_VALUE: 17
PIF_VALUE: 25
PIF_VALUE: 23
PIF_VALUE: 18
PIF_VALUE: 29
PIF_VALUE: 22
PIF_VALUE: 23
PIF_VALUE: 22
PIF_VALUE: 16

## 2025-02-02 ASSESSMENT — PAIN SCALES - GENERAL
PAINLEVEL_OUTOF10: 0

## 2025-02-03 ENCOUNTER — APPOINTMENT (OUTPATIENT)
Dept: CT IMAGING | Age: 80
DRG: 003 | End: 2025-02-03
Payer: MEDICARE

## 2025-02-03 LAB
ALBUMIN SERPL-MCNC: 3 G/DL (ref 3.4–5)
ANION GAP SERPL CALCULATED.3IONS-SCNC: 7 MMOL/L (ref 3–16)
BACTERIA SPEC RESP CULT: NORMAL
BASOPHILS # BLD: 0 K/UL (ref 0–0.2)
BASOPHILS NFR BLD: 0.2 %
BUN SERPL-MCNC: 23 MG/DL (ref 7–20)
CALCIUM SERPL-MCNC: 8.8 MG/DL (ref 8.3–10.6)
CHLORIDE SERPL-SCNC: 107 MMOL/L (ref 99–110)
CO2 SERPL-SCNC: 28 MMOL/L (ref 21–32)
CREAT SERPL-MCNC: 0.5 MG/DL (ref 0.6–1.2)
DEPRECATED RDW RBC AUTO: 13.7 % (ref 12.4–15.4)
EOSINOPHIL # BLD: 0.1 K/UL (ref 0–0.6)
EOSINOPHIL NFR BLD: 1.2 %
GFR SERPLBLD CREATININE-BSD FMLA CKD-EPI: >90 ML/MIN/{1.73_M2}
GLUCOSE SERPL-MCNC: 116 MG/DL (ref 70–99)
GRAM STN SPEC: NORMAL
HCT VFR BLD AUTO: 28.9 % (ref 36–48)
HGB BLD-MCNC: 9.7 G/DL (ref 12–16)
LYMPHOCYTES # BLD: 1.6 K/UL (ref 1–5.1)
LYMPHOCYTES NFR BLD: 15.2 %
MAGNESIUM SERPL-MCNC: 2.31 MG/DL (ref 1.8–2.4)
MCH RBC QN AUTO: 35 PG (ref 26–34)
MCHC RBC AUTO-ENTMCNC: 33.5 G/DL (ref 31–36)
MCV RBC AUTO: 104.5 FL (ref 80–100)
MONOCYTES # BLD: 0.8 K/UL (ref 0–1.3)
MONOCYTES NFR BLD: 8 %
NEUTROPHILS # BLD: 8 K/UL (ref 1.7–7.7)
NEUTROPHILS NFR BLD: 75.4 %
PHOSPHATE SERPL-MCNC: 2.7 MG/DL (ref 2.5–4.9)
PLATELET # BLD AUTO: 275 K/UL (ref 135–450)
PMV BLD AUTO: 7.3 FL (ref 5–10.5)
POTASSIUM SERPL-SCNC: 3.6 MMOL/L (ref 3.5–5.1)
RBC # BLD AUTO: 2.77 M/UL (ref 4–5.2)
SODIUM SERPL-SCNC: 142 MMOL/L (ref 136–145)
WBC # BLD AUTO: 10.7 K/UL (ref 4–11)

## 2025-02-03 PROCEDURE — 31622 DX BRONCHOSCOPE/WASH: CPT | Performed by: INTERNAL MEDICINE

## 2025-02-03 PROCEDURE — 2580000003 HC RX 258

## 2025-02-03 PROCEDURE — 80069 RENAL FUNCTION PANEL: CPT

## 2025-02-03 PROCEDURE — 6370000000 HC RX 637 (ALT 250 FOR IP): Performed by: STUDENT IN AN ORGANIZED HEALTH CARE EDUCATION/TRAINING PROGRAM

## 2025-02-03 PROCEDURE — 94003 VENT MGMT INPAT SUBQ DAY: CPT

## 2025-02-03 PROCEDURE — 0B113F4 BYPASS TRACHEA TO CUTANEOUS WITH TRACHEOSTOMY DEVICE, PERCUTANEOUS APPROACH: ICD-10-PCS | Performed by: INTERNAL MEDICINE

## 2025-02-03 PROCEDURE — 51798 US URINE CAPACITY MEASURE: CPT

## 2025-02-03 PROCEDURE — 2500000003 HC RX 250 WO HCPCS

## 2025-02-03 PROCEDURE — 6370000000 HC RX 637 (ALT 250 FOR IP)

## 2025-02-03 PROCEDURE — 36415 COLL VENOUS BLD VENIPUNCTURE: CPT

## 2025-02-03 PROCEDURE — 83735 ASSAY OF MAGNESIUM: CPT

## 2025-02-03 PROCEDURE — 70450 CT HEAD/BRAIN W/O DYE: CPT

## 2025-02-03 PROCEDURE — 2700000000 HC OXYGEN THERAPY PER DAY

## 2025-02-03 PROCEDURE — 6360000002 HC RX W HCPCS

## 2025-02-03 PROCEDURE — 99291 CRITICAL CARE FIRST HOUR: CPT | Performed by: INTERNAL MEDICINE

## 2025-02-03 PROCEDURE — 94761 N-INVAS EAR/PLS OXIMETRY MLT: CPT

## 2025-02-03 PROCEDURE — 31622 DX BRONCHOSCOPE/WASH: CPT

## 2025-02-03 PROCEDURE — 51701 INSERT BLADDER CATHETER: CPT

## 2025-02-03 PROCEDURE — 99232 SBSQ HOSP IP/OBS MODERATE 35: CPT | Performed by: NURSE PRACTITIONER

## 2025-02-03 PROCEDURE — 2000000000 HC ICU R&B

## 2025-02-03 PROCEDURE — 0BH18EZ INSERTION OF ENDOTRACHEAL AIRWAY INTO TRACHEA, VIA NATURAL OR ARTIFICIAL OPENING ENDOSCOPIC: ICD-10-PCS | Performed by: INTERNAL MEDICINE

## 2025-02-03 PROCEDURE — 6370000000 HC RX 637 (ALT 250 FOR IP): Performed by: NURSE PRACTITIONER

## 2025-02-03 PROCEDURE — 85025 COMPLETE CBC W/AUTO DIFF WBC: CPT

## 2025-02-03 PROCEDURE — 31600 PLANNED TRACHEOSTOMY: CPT | Performed by: INTERNAL MEDICINE

## 2025-02-03 RX ORDER — PROPOFOL 10 MG/ML
INJECTION, EMULSION INTRAVENOUS
Status: COMPLETED
Start: 2025-02-03 | End: 2025-02-03

## 2025-02-03 RX ORDER — ROCURONIUM BROMIDE 10 MG/ML
INJECTION, SOLUTION INTRAVENOUS
Status: COMPLETED
Start: 2025-02-03 | End: 2025-02-03

## 2025-02-03 RX ORDER — FENTANYL CITRATE 50 UG/ML
INJECTION, SOLUTION INTRAMUSCULAR; INTRAVENOUS
Status: COMPLETED
Start: 2025-02-03 | End: 2025-02-03

## 2025-02-03 RX ADMIN — PROPOFOL 1000 MG: 10 INJECTION, EMULSION INTRAVENOUS at 14:31

## 2025-02-03 RX ADMIN — POLYETHYLENE GLYCOL 3350 17 G: 17 POWDER, FOR SOLUTION ORAL at 19:49

## 2025-02-03 RX ADMIN — SENNOSIDES 17.2 MG: 8.6 TABLET, COATED ORAL at 19:49

## 2025-02-03 RX ADMIN — MODAFINIL 200 MG: 100 TABLET ORAL at 09:40

## 2025-02-03 RX ADMIN — LISINOPRIL 40 MG: 40 TABLET ORAL at 09:40

## 2025-02-03 RX ADMIN — LABETALOL HYDROCHLORIDE 10 MG: 5 INJECTION, SOLUTION INTRAVENOUS at 23:25

## 2025-02-03 RX ADMIN — ROCURONIUM BROMIDE 50 MG: 10 INJECTION, SOLUTION INTRAVENOUS at 13:43

## 2025-02-03 RX ADMIN — Medication 15 ML: at 09:40

## 2025-02-03 RX ADMIN — FENTANYL CITRATE 100 MCG: 50 INJECTION INTRAMUSCULAR; INTRAVENOUS at 13:35

## 2025-02-03 RX ADMIN — SODIUM CHLORIDE, PRESERVATIVE FREE 10 ML: 5 INJECTION INTRAVENOUS at 09:41

## 2025-02-03 RX ADMIN — POTASSIUM PHOSPHATE, MONOBASIC AND POTASSIUM PHOSPHATE, DIBASIC 15 MMOL: 224; 236 INJECTION, SOLUTION, CONCENTRATE INTRAVENOUS at 09:55

## 2025-02-03 RX ADMIN — HYDRALAZINE HYDROCHLORIDE 5 MG: 20 INJECTION INTRAMUSCULAR; INTRAVENOUS at 15:58

## 2025-02-03 RX ADMIN — SODIUM CHLORIDE, PRESERVATIVE FREE 40 MG: 5 INJECTION INTRAVENOUS at 09:39

## 2025-02-03 RX ADMIN — Medication: at 19:50

## 2025-02-03 RX ADMIN — HEPARIN SODIUM 5000 UNITS: 5000 INJECTION INTRAVENOUS; SUBCUTANEOUS at 06:15

## 2025-02-03 RX ADMIN — SODIUM CHLORIDE, PRESERVATIVE FREE 10 ML: 5 INJECTION INTRAVENOUS at 19:56

## 2025-02-03 RX ADMIN — OXYCODONE HYDROCHLORIDE 10 MG: 5 TABLET ORAL at 15:29

## 2025-02-03 RX ADMIN — FOLIC ACID 1 MG: 1 TABLET ORAL at 09:40

## 2025-02-03 RX ADMIN — FENTANYL CITRATE 100 MCG: 50 INJECTION INTRAMUSCULAR; INTRAVENOUS at 14:05

## 2025-02-03 RX ADMIN — Medication: at 09:42

## 2025-02-03 ASSESSMENT — PULMONARY FUNCTION TESTS
PIF_VALUE: 18
PIF_VALUE: 15
PIF_VALUE: 17
PIF_VALUE: 21
PIF_VALUE: 18
PIF_VALUE: 23
PIF_VALUE: 24
PIF_VALUE: 17
PIF_VALUE: 15
PIF_VALUE: 16
PIF_VALUE: 21
PIF_VALUE: 35
PIF_VALUE: 15
PIF_VALUE: 15
PIF_VALUE: 18
PIF_VALUE: 17
PIF_VALUE: 19
PIF_VALUE: 16
PIF_VALUE: 20
PIF_VALUE: 15
PIF_VALUE: 18
PIF_VALUE: 19
PIF_VALUE: 16
PIF_VALUE: 23
PIF_VALUE: 16
PIF_VALUE: 15
PIF_VALUE: 17
PIF_VALUE: 18
PIF_VALUE: 16
PIF_VALUE: 17
PIF_VALUE: 19
PIF_VALUE: 26
PIF_VALUE: 20
PIF_VALUE: 18
PIF_VALUE: 15
PIF_VALUE: 15
PIF_VALUE: 16
PIF_VALUE: 15
PIF_VALUE: 15
PIF_VALUE: 17
PIF_VALUE: 15
PIF_VALUE: 15
PIF_VALUE: 19
PIF_VALUE: 15
PIF_VALUE: 15
PIF_VALUE: 16
PIF_VALUE: 28
PIF_VALUE: 17
PIF_VALUE: 15
PIF_VALUE: 19
PIF_VALUE: 16
PIF_VALUE: 17
PIF_VALUE: 15
PIF_VALUE: 18
PIF_VALUE: 20
PIF_VALUE: 38
PIF_VALUE: 18
PIF_VALUE: 24
PIF_VALUE: 20
PIF_VALUE: 15
PIF_VALUE: 15
PIF_VALUE: 17
PIF_VALUE: 15
PIF_VALUE: 17
PIF_VALUE: 17
PIF_VALUE: 15
PIF_VALUE: 16
PIF_VALUE: 16
PIF_VALUE: 15

## 2025-02-03 ASSESSMENT — PAIN SCALES - GENERAL
PAINLEVEL_OUTOF10: 0

## 2025-02-03 NOTE — CARE COORDINATION
CM continues to follow for DCP- pt had tracheostomy today. Continues to be followed by pulm, NCC, Nsgy teams. EVD and NGT remain in place. JAZMINE spoke with Aileen at [facility], as they have been following case- pt does qualify for LTACH but would need EVD removed prior to LTACH. No precert needed for placement. Hospitalist aware. CM will continue to follow.    Thank you  Cat Fco HERNANDEZ, BSN,   ICU   [phone]

## 2025-02-04 ENCOUNTER — APPOINTMENT (OUTPATIENT)
Dept: MRI IMAGING | Age: 80
DRG: 003 | End: 2025-02-04
Payer: MEDICARE

## 2025-02-04 LAB
ALBUMIN SERPL-MCNC: 3 G/DL (ref 3.4–5)
ANION GAP SERPL CALCULATED.3IONS-SCNC: 9 MMOL/L (ref 3–16)
APPEARANCE CSF: ABNORMAL
BACTERIA CSF CULT: NORMAL
BASOPHILS # BLD: 0.1 K/UL (ref 0–0.2)
BASOPHILS NFR BLD: 0.4 %
BUN SERPL-MCNC: 19 MG/DL (ref 7–20)
CALCIUM SERPL-MCNC: 8.6 MG/DL (ref 8.3–10.6)
CHLORIDE SERPL-SCNC: 107 MMOL/L (ref 99–110)
CLOT EVALUATION CSF: ABNORMAL
CO2 SERPL-SCNC: 24 MMOL/L (ref 21–32)
COLOR CSF: ABNORMAL
CREAT SERPL-MCNC: 0.5 MG/DL (ref 0.6–1.2)
DEPRECATED RDW RBC AUTO: 13.8 % (ref 12.4–15.4)
EOSINOPHIL # BLD: 0.1 K/UL (ref 0–0.6)
EOSINOPHIL NFR BLD: 0.6 %
GFR SERPLBLD CREATININE-BSD FMLA CKD-EPI: >90 ML/MIN/{1.73_M2}
GLUCOSE CSF-MCNC: 82 MG/DL (ref 40–80)
GLUCOSE SERPL-MCNC: 132 MG/DL (ref 70–99)
GRAM STN SPEC: NORMAL
HCT VFR BLD AUTO: 30.5 % (ref 36–48)
HGB BLD-MCNC: 10.2 G/DL (ref 12–16)
LYMPHOCYTES # BLD: 1.3 K/UL (ref 1–5.1)
LYMPHOCYTES NFR BLD: 8.8 %
LYMPHOCYTES NFR CSF: 32 % (ref 40–80)
MACROPHAGES NFR CSF: 10 %
MAGNESIUM SERPL-MCNC: 2.28 MG/DL (ref 1.8–2.4)
MCH RBC QN AUTO: 34.9 PG (ref 26–34)
MCHC RBC AUTO-ENTMCNC: 33.5 G/DL (ref 31–36)
MCV RBC AUTO: 104.2 FL (ref 80–100)
MENING+ENC PNL CSF NAA+NON-PROBE: NORMAL
MONOCYTES # BLD: 1.1 K/UL (ref 0–1.3)
MONOCYTES NFR BLD: 7 %
MONONUC CELLS NFR CSF: 12 % (ref 15–45)
NEUTROPHILS # BLD: 12.6 K/UL (ref 1.7–7.7)
NEUTROPHILS NFR BLD: 83.2 %
NEUTROPHILS NFR CSF: 46 % (ref 0–6)
NUC CELL # FLD MANUAL: 66 /CUMM (ref 0–5)
PHOSPHATE SERPL-MCNC: 2.7 MG/DL (ref 2.5–4.9)
PLATELET # BLD AUTO: 289 K/UL (ref 135–450)
PMV BLD AUTO: 7.4 FL (ref 5–10.5)
POTASSIUM SERPL-SCNC: 3.9 MMOL/L (ref 3.5–5.1)
PROT CSF-MCNC: 17 MG/DL (ref 15–45)
RBC # BLD AUTO: 2.93 M/UL (ref 4–5.2)
RBC # FLD MANUAL: 8500 /CUMM
REPORT: NORMAL
SODIUM SERPL-SCNC: 140 MMOL/L (ref 136–145)
TOTAL CELLS COUNTED CSF: 50
TUBE # CSF: ABNORMAL
WBC # BLD AUTO: 15.2 K/UL (ref 4–11)

## 2025-02-04 PROCEDURE — 87483 CNS DNA AMP PROBE TYPE 12-25: CPT

## 2025-02-04 PROCEDURE — 70551 MRI BRAIN STEM W/O DYE: CPT

## 2025-02-04 PROCEDURE — 94761 N-INVAS EAR/PLS OXIMETRY MLT: CPT

## 2025-02-04 PROCEDURE — 94640 AIRWAY INHALATION TREATMENT: CPT

## 2025-02-04 PROCEDURE — 6370000000 HC RX 637 (ALT 250 FOR IP)

## 2025-02-04 PROCEDURE — 6370000000 HC RX 637 (ALT 250 FOR IP): Performed by: STUDENT IN AN ORGANIZED HEALTH CARE EDUCATION/TRAINING PROGRAM

## 2025-02-04 PROCEDURE — 6360000002 HC RX W HCPCS

## 2025-02-04 PROCEDURE — 99233 SBSQ HOSP IP/OBS HIGH 50: CPT

## 2025-02-04 PROCEDURE — 2700000000 HC OXYGEN THERAPY PER DAY

## 2025-02-04 PROCEDURE — 2000000000 HC ICU R&B

## 2025-02-04 PROCEDURE — 84157 ASSAY OF PROTEIN OTHER: CPT

## 2025-02-04 PROCEDURE — 2580000003 HC RX 258

## 2025-02-04 PROCEDURE — 2500000003 HC RX 250 WO HCPCS

## 2025-02-04 PROCEDURE — 51701 INSERT BLADDER CATHETER: CPT

## 2025-02-04 PROCEDURE — 72141 MRI NECK SPINE W/O DYE: CPT

## 2025-02-04 PROCEDURE — 83735 ASSAY OF MAGNESIUM: CPT

## 2025-02-04 PROCEDURE — 89051 BODY FLUID CELL COUNT: CPT

## 2025-02-04 PROCEDURE — 82945 GLUCOSE OTHER FLUID: CPT

## 2025-02-04 PROCEDURE — 99291 CRITICAL CARE FIRST HOUR: CPT | Performed by: INTERNAL MEDICINE

## 2025-02-04 PROCEDURE — 6360000002 HC RX W HCPCS: Performed by: NURSE PRACTITIONER

## 2025-02-04 PROCEDURE — 94003 VENT MGMT INPAT SUBQ DAY: CPT

## 2025-02-04 PROCEDURE — 87205 SMEAR GRAM STAIN: CPT

## 2025-02-04 PROCEDURE — 36415 COLL VENOUS BLD VENIPUNCTURE: CPT

## 2025-02-04 PROCEDURE — 51798 US URINE CAPACITY MEASURE: CPT

## 2025-02-04 PROCEDURE — 87070 CULTURE OTHR SPECIMN AEROBIC: CPT

## 2025-02-04 PROCEDURE — 80069 RENAL FUNCTION PANEL: CPT

## 2025-02-04 PROCEDURE — 85025 COMPLETE CBC W/AUTO DIFF WBC: CPT

## 2025-02-04 RX ORDER — IPRATROPIUM BROMIDE AND ALBUTEROL SULFATE 2.5; .5 MG/3ML; MG/3ML
1 SOLUTION RESPIRATORY (INHALATION)
Status: DISCONTINUED | OUTPATIENT
Start: 2025-02-04 | End: 2025-02-10

## 2025-02-04 RX ORDER — AMLODIPINE BESYLATE 5 MG/1
5 TABLET ORAL NIGHTLY
Status: DISCONTINUED | OUTPATIENT
Start: 2025-02-04 | End: 2025-02-09

## 2025-02-04 RX ORDER — SODIUM CHLORIDE FOR INHALATION 3 %
4 VIAL, NEBULIZER (ML) INHALATION 2 TIMES DAILY
Status: DISCONTINUED | OUTPATIENT
Start: 2025-02-04 | End: 2025-02-10

## 2025-02-04 RX ADMIN — HEPARIN SODIUM 5000 UNITS: 5000 INJECTION INTRAVENOUS; SUBCUTANEOUS at 14:37

## 2025-02-04 RX ADMIN — SODIUM CHLORIDE 30 MG/ML INHALATION SOLUTION 4 ML: 30 SOLUTION INHALANT at 21:44

## 2025-02-04 RX ADMIN — SODIUM CHLORIDE, PRESERVATIVE FREE 40 MG: 5 INJECTION INTRAVENOUS at 07:56

## 2025-02-04 RX ADMIN — MORPHINE SULFATE 2 MG: 2 INJECTION, SOLUTION INTRAMUSCULAR; INTRAVENOUS at 14:34

## 2025-02-04 RX ADMIN — FOLIC ACID 1 MG: 1 TABLET ORAL at 07:56

## 2025-02-04 RX ADMIN — POLYETHYLENE GLYCOL 3350 17 G: 17 POWDER, FOR SOLUTION ORAL at 07:56

## 2025-02-04 RX ADMIN — SODIUM CHLORIDE, PRESERVATIVE FREE 10 ML: 5 INJECTION INTRAVENOUS at 21:03

## 2025-02-04 RX ADMIN — Medication: at 07:56

## 2025-02-04 RX ADMIN — Medication: at 21:04

## 2025-02-04 RX ADMIN — HEPARIN SODIUM 5000 UNITS: 5000 INJECTION INTRAVENOUS; SUBCUTANEOUS at 21:31

## 2025-02-04 RX ADMIN — SENNOSIDES 17.2 MG: 8.6 TABLET, COATED ORAL at 07:56

## 2025-02-04 RX ADMIN — SODIUM CHLORIDE, PRESERVATIVE FREE 10 ML: 5 INJECTION INTRAVENOUS at 07:57

## 2025-02-04 RX ADMIN — Medication 15 ML: at 07:56

## 2025-02-04 RX ADMIN — IPRATROPIUM BROMIDE AND ALBUTEROL SULFATE 1 DOSE: 2.5; .5 SOLUTION RESPIRATORY (INHALATION) at 15:42

## 2025-02-04 RX ADMIN — LISINOPRIL 40 MG: 40 TABLET ORAL at 07:56

## 2025-02-04 RX ADMIN — IPRATROPIUM BROMIDE AND ALBUTEROL SULFATE 1 DOSE: 2.5; .5 SOLUTION RESPIRATORY (INHALATION) at 21:44

## 2025-02-04 RX ADMIN — SENNOSIDES 17.2 MG: 8.6 TABLET, COATED ORAL at 21:31

## 2025-02-04 RX ADMIN — SODIUM CHLORIDE 30 MG/ML INHALATION SOLUTION 4 ML: 30 SOLUTION INHALANT at 11:04

## 2025-02-04 RX ADMIN — AMLODIPINE BESYLATE 5 MG: 5 TABLET ORAL at 21:05

## 2025-02-04 RX ADMIN — MODAFINIL 200 MG: 100 TABLET ORAL at 07:55

## 2025-02-04 RX ADMIN — POLYETHYLENE GLYCOL 3350 17 G: 17 POWDER, FOR SOLUTION ORAL at 21:31

## 2025-02-04 RX ADMIN — IPRATROPIUM BROMIDE AND ALBUTEROL SULFATE 1 DOSE: 2.5; .5 SOLUTION RESPIRATORY (INHALATION) at 11:04

## 2025-02-04 ASSESSMENT — PULMONARY FUNCTION TESTS
PIF_VALUE: 16
PIF_VALUE: 15
PIF_VALUE: 15
PIF_VALUE: 16
PIF_VALUE: 15
PIF_VALUE: 17
PIF_VALUE: 20
PIF_VALUE: 10
PIF_VALUE: 15
PIF_VALUE: 18
PIF_VALUE: 19
PIF_VALUE: 19
PIF_VALUE: 15
PIF_VALUE: 15
PIF_VALUE: 17
PIF_VALUE: 15
PIF_VALUE: 16
PIF_VALUE: 18
PIF_VALUE: 19
PIF_VALUE: 19
PIF_VALUE: 15
PIF_VALUE: 16
PIF_VALUE: 15
PIF_VALUE: 17
PIF_VALUE: 15
PIF_VALUE: 15
PIF_VALUE: 18
PIF_VALUE: 15
PIF_VALUE: 15
PIF_VALUE: 16
PIF_VALUE: 15

## 2025-02-04 ASSESSMENT — PAIN SCALES - WONG BAKER: WONGBAKER_NUMERICALRESPONSE: NO HURT

## 2025-02-04 ASSESSMENT — PAIN SCALES - GENERAL
PAINLEVEL_OUTOF10: 0

## 2025-02-04 NOTE — CARE COORDINATION
MANUEL and Aileen at [facility] following case- pt does qualify for LTACH but would need EVD removed prior to LTACH. No precert needed for placement. Hospitalist aware. CM will continue to follow.    Possible Peg to be placed today,     SW following    Electronically signed by RAVIN Sullivan on 2/4/2025 at 10:43 AM

## 2025-02-05 ENCOUNTER — ANESTHESIA (OUTPATIENT)
Dept: ENDOSCOPY | Age: 80
End: 2025-02-05
Payer: MEDICARE

## 2025-02-05 ENCOUNTER — APPOINTMENT (OUTPATIENT)
Dept: ENDOSCOPY | Age: 80
DRG: 003 | End: 2025-02-05
Attending: INTERNAL MEDICINE
Payer: MEDICARE

## 2025-02-05 ENCOUNTER — ANESTHESIA EVENT (OUTPATIENT)
Dept: ENDOSCOPY | Age: 80
End: 2025-02-05
Payer: MEDICARE

## 2025-02-05 LAB
ALBUMIN SERPL-MCNC: 3.2 G/DL (ref 3.4–5)
ANION GAP SERPL CALCULATED.3IONS-SCNC: 8 MMOL/L (ref 3–16)
BACTERIA URNS QL MICRO: ABNORMAL /HPF
BASOPHILS # BLD: 0 K/UL (ref 0–0.2)
BASOPHILS NFR BLD: 0.1 %
BILIRUB UR QL STRIP.AUTO: NEGATIVE
BUN SERPL-MCNC: 16 MG/DL (ref 7–20)
CALCIUM SERPL-MCNC: 8.9 MG/DL (ref 8.3–10.6)
CHLORIDE SERPL-SCNC: 107 MMOL/L (ref 99–110)
CLARITY UR: CLEAR
CO2 SERPL-SCNC: 27 MMOL/L (ref 21–32)
COLOR UR: YELLOW
CREAT SERPL-MCNC: 0.5 MG/DL (ref 0.6–1.2)
DEPRECATED RDW RBC AUTO: 13.8 % (ref 12.4–15.4)
EOSINOPHIL # BLD: 0.1 K/UL (ref 0–0.6)
EOSINOPHIL NFR BLD: 0.6 %
EPI CELLS #/AREA URNS HPF: ABNORMAL /HPF (ref 0–5)
GFR SERPLBLD CREATININE-BSD FMLA CKD-EPI: >90 ML/MIN/{1.73_M2}
GLUCOSE SERPL-MCNC: 121 MG/DL (ref 70–99)
GLUCOSE UR STRIP.AUTO-MCNC: NEGATIVE MG/DL
HCT VFR BLD AUTO: 28.2 % (ref 36–48)
HGB BLD-MCNC: 9.5 G/DL (ref 12–16)
HGB UR QL STRIP.AUTO: ABNORMAL
INR PPP: 1.12 (ref 0.85–1.15)
KETONES UR STRIP.AUTO-MCNC: NEGATIVE MG/DL
LEUKOCYTE ESTERASE UR QL STRIP.AUTO: NEGATIVE
LYMPHOCYTES # BLD: 0.9 K/UL (ref 1–5.1)
LYMPHOCYTES NFR BLD: 6.7 %
MAGNESIUM SERPL-MCNC: 2.33 MG/DL (ref 1.8–2.4)
MCH RBC QN AUTO: 35 PG (ref 26–34)
MCHC RBC AUTO-ENTMCNC: 33.7 G/DL (ref 31–36)
MCV RBC AUTO: 103.9 FL (ref 80–100)
MONOCYTES # BLD: 1 K/UL (ref 0–1.3)
MONOCYTES NFR BLD: 7.2 %
MUCOUS THREADS #/AREA URNS LPF: ABNORMAL /LPF
NEUTROPHILS # BLD: 11.6 K/UL (ref 1.7–7.7)
NEUTROPHILS NFR BLD: 85.4 %
NITRITE UR QL STRIP.AUTO: POSITIVE
PH UR STRIP.AUTO: 6 [PH] (ref 5–8)
PHOSPHATE SERPL-MCNC: 2.8 MG/DL (ref 2.5–4.9)
PLATELET # BLD AUTO: 314 K/UL (ref 135–450)
PMV BLD AUTO: 7.3 FL (ref 5–10.5)
POTASSIUM SERPL-SCNC: 3.9 MMOL/L (ref 3.5–5.1)
PROT UR STRIP.AUTO-MCNC: 30 MG/DL
PROTHROMBIN TIME: 14.6 SEC (ref 11.9–14.9)
RBC # BLD AUTO: 2.71 M/UL (ref 4–5.2)
RBC #/AREA URNS HPF: ABNORMAL /HPF (ref 0–4)
RENAL EPI CELLS #/AREA UR COMP ASSIST: ABNORMAL /HPF (ref 0–1)
SODIUM SERPL-SCNC: 142 MMOL/L (ref 136–145)
SP GR UR STRIP.AUTO: 1.02 (ref 1–1.03)
UA COMPLETE W REFLEX CULTURE PNL UR: ABNORMAL
UA DIPSTICK W REFLEX MICRO PNL UR: YES
URN SPEC COLLECT METH UR: ABNORMAL
UROBILINOGEN UR STRIP-ACNC: 1 E.U./DL
WBC # BLD AUTO: 13.6 K/UL (ref 4–11)
WBC #/AREA URNS HPF: ABNORMAL /HPF (ref 0–5)

## 2025-02-05 PROCEDURE — 2580000003 HC RX 258

## 2025-02-05 PROCEDURE — 6370000000 HC RX 637 (ALT 250 FOR IP)

## 2025-02-05 PROCEDURE — 2700000000 HC OXYGEN THERAPY PER DAY

## 2025-02-05 PROCEDURE — 2500000003 HC RX 250 WO HCPCS: Performed by: INTERNAL MEDICINE

## 2025-02-05 PROCEDURE — 51798 US URINE CAPACITY MEASURE: CPT

## 2025-02-05 PROCEDURE — APPNB45 APP NON BILLABLE 31-45 MINUTES: Performed by: NURSE PRACTITIONER

## 2025-02-05 PROCEDURE — 3700000001 HC ADD 15 MINUTES (ANESTHESIA): Performed by: INTERNAL MEDICINE

## 2025-02-05 PROCEDURE — 3E0G76Z INTRODUCTION OF NUTRITIONAL SUBSTANCE INTO UPPER GI, VIA NATURAL OR ARTIFICIAL OPENING: ICD-10-PCS | Performed by: INTERNAL MEDICINE

## 2025-02-05 PROCEDURE — 3700000000 HC ANESTHESIA ATTENDED CARE: Performed by: INTERNAL MEDICINE

## 2025-02-05 PROCEDURE — 99291 CRITICAL CARE FIRST HOUR: CPT | Performed by: INTERNAL MEDICINE

## 2025-02-05 PROCEDURE — 83735 ASSAY OF MAGNESIUM: CPT

## 2025-02-05 PROCEDURE — 94003 VENT MGMT INPAT SUBQ DAY: CPT

## 2025-02-05 PROCEDURE — 81001 URINALYSIS AUTO W/SCOPE: CPT

## 2025-02-05 PROCEDURE — 6360000002 HC RX W HCPCS: Performed by: INTERNAL MEDICINE

## 2025-02-05 PROCEDURE — 2500000003 HC RX 250 WO HCPCS

## 2025-02-05 PROCEDURE — 94640 AIRWAY INHALATION TREATMENT: CPT

## 2025-02-05 PROCEDURE — 36415 COLL VENOUS BLD VENIPUNCTURE: CPT

## 2025-02-05 PROCEDURE — 85610 PROTHROMBIN TIME: CPT

## 2025-02-05 PROCEDURE — 0DH63UZ INSERTION OF FEEDING DEVICE INTO STOMACH, PERCUTANEOUS APPROACH: ICD-10-PCS | Performed by: INTERNAL MEDICINE

## 2025-02-05 PROCEDURE — 85025 COMPLETE CBC W/AUTO DIFF WBC: CPT

## 2025-02-05 PROCEDURE — 51701 INSERT BLADDER CATHETER: CPT

## 2025-02-05 PROCEDURE — 99024 POSTOP FOLLOW-UP VISIT: CPT | Performed by: NURSE PRACTITIONER

## 2025-02-05 PROCEDURE — 6360000002 HC RX W HCPCS

## 2025-02-05 PROCEDURE — 6370000000 HC RX 637 (ALT 250 FOR IP): Performed by: STUDENT IN AN ORGANIZED HEALTH CARE EDUCATION/TRAINING PROGRAM

## 2025-02-05 PROCEDURE — 80069 RENAL FUNCTION PANEL: CPT

## 2025-02-05 PROCEDURE — 2000000000 HC ICU R&B

## 2025-02-05 PROCEDURE — 3609013300 HC EGD TUBE PLACEMENT: Performed by: INTERNAL MEDICINE

## 2025-02-05 PROCEDURE — 2709999900 HC NON-CHARGEABLE SUPPLY: Performed by: INTERNAL MEDICINE

## 2025-02-05 PROCEDURE — 94761 N-INVAS EAR/PLS OXIMETRY MLT: CPT

## 2025-02-05 PROCEDURE — 99233 SBSQ HOSP IP/OBS HIGH 50: CPT

## 2025-02-05 RX ORDER — PROPOFOL 10 MG/ML
INJECTION, EMULSION INTRAVENOUS
Status: DISCONTINUED | OUTPATIENT
Start: 2025-02-05 | End: 2025-02-05 | Stop reason: SDUPTHER

## 2025-02-05 RX ORDER — CEFAZOLIN SODIUM 1 G/3ML
INJECTION, POWDER, FOR SOLUTION INTRAMUSCULAR; INTRAVENOUS
Status: DISCONTINUED | OUTPATIENT
Start: 2025-02-05 | End: 2025-02-05

## 2025-02-05 RX ORDER — SODIUM CHLORIDE 9 MG/ML
INJECTION, SOLUTION INTRAVENOUS
Status: DISCONTINUED | OUTPATIENT
Start: 2025-02-05 | End: 2025-02-05 | Stop reason: SDUPTHER

## 2025-02-05 RX ADMIN — LISINOPRIL 40 MG: 40 TABLET ORAL at 07:30

## 2025-02-05 RX ADMIN — Medication: at 20:18

## 2025-02-05 RX ADMIN — FOLIC ACID 1 MG: 1 TABLET ORAL at 07:30

## 2025-02-05 RX ADMIN — WATER 2000 MG: 1 INJECTION INTRAMUSCULAR; INTRAVENOUS; SUBCUTANEOUS at 15:04

## 2025-02-05 RX ADMIN — SODIUM CHLORIDE 30 MG/ML INHALATION SOLUTION 4 ML: 30 SOLUTION INHALANT at 07:55

## 2025-02-05 RX ADMIN — IPRATROPIUM BROMIDE AND ALBUTEROL SULFATE 1 DOSE: 2.5; .5 SOLUTION RESPIRATORY (INHALATION) at 11:03

## 2025-02-05 RX ADMIN — SODIUM CHLORIDE, PRESERVATIVE FREE 40 MG: 5 INJECTION INTRAVENOUS at 07:29

## 2025-02-05 RX ADMIN — SODIUM CHLORIDE 30 MG/ML INHALATION SOLUTION 4 ML: 30 SOLUTION INHALANT at 20:38

## 2025-02-05 RX ADMIN — Medication 15 ML: at 07:50

## 2025-02-05 RX ADMIN — SODIUM CHLORIDE, PRESERVATIVE FREE 10 ML: 5 INJECTION INTRAVENOUS at 20:18

## 2025-02-05 RX ADMIN — POLYETHYLENE GLYCOL 3350 17 G: 17 POWDER, FOR SOLUTION ORAL at 20:18

## 2025-02-05 RX ADMIN — AMLODIPINE BESYLATE 5 MG: 5 TABLET ORAL at 20:18

## 2025-02-05 RX ADMIN — IPRATROPIUM BROMIDE AND ALBUTEROL SULFATE 1 DOSE: 2.5; .5 SOLUTION RESPIRATORY (INHALATION) at 20:38

## 2025-02-05 RX ADMIN — SODIUM CHLORIDE, PRESERVATIVE FREE 10 ML: 5 INJECTION INTRAVENOUS at 07:29

## 2025-02-05 RX ADMIN — MODAFINIL 200 MG: 100 TABLET ORAL at 07:30

## 2025-02-05 RX ADMIN — POLYETHYLENE GLYCOL 3350 17 G: 17 POWDER, FOR SOLUTION ORAL at 07:30

## 2025-02-05 RX ADMIN — SENNOSIDES 17.2 MG: 8.6 TABLET, COATED ORAL at 07:30

## 2025-02-05 RX ADMIN — SENNOSIDES 17.2 MG: 8.6 TABLET, COATED ORAL at 20:18

## 2025-02-05 RX ADMIN — PROPOFOL 20 MG: 10 INJECTION, EMULSION INTRAVENOUS at 15:38

## 2025-02-05 RX ADMIN — IPRATROPIUM BROMIDE AND ALBUTEROL SULFATE 1 DOSE: 2.5; .5 SOLUTION RESPIRATORY (INHALATION) at 07:55

## 2025-02-05 RX ADMIN — PROPOFOL 40 MG: 10 INJECTION, EMULSION INTRAVENOUS at 15:27

## 2025-02-05 RX ADMIN — PROPOFOL 20 MG: 10 INJECTION, EMULSION INTRAVENOUS at 15:33

## 2025-02-05 RX ADMIN — Medication: at 07:29

## 2025-02-05 RX ADMIN — SODIUM CHLORIDE: 900 INJECTION, SOLUTION INTRAVENOUS at 15:23

## 2025-02-05 ASSESSMENT — PULMONARY FUNCTION TESTS
PIF_VALUE: 15
PIF_VALUE: 15
PIF_VALUE: 16
PIF_VALUE: 15
PIF_VALUE: 16
PIF_VALUE: 15
PIF_VALUE: 17
PIF_VALUE: 15
PIF_VALUE: 16
PIF_VALUE: 15
PIF_VALUE: 15
PIF_VALUE: 18
PIF_VALUE: 17

## 2025-02-05 ASSESSMENT — PAIN SCALES - GENERAL
PAINLEVEL_OUTOF10: 0

## 2025-02-05 ASSESSMENT — PAIN SCALES - WONG BAKER: WONGBAKER_NUMERICALRESPONSE: NO HURT

## 2025-02-05 NOTE — ANESTHESIA PRE PROCEDURE
Department of Anesthesiology  Preprocedure Note       Name:  Maria Antonia Crawford   Age:  79 y.o.  :  1945                                          MRN:  [id number]         Date:  2025      Surgeon: Surgeon(s):  Patricia Chamberlain MD    Procedure: Procedure(s):  ESOPHAGOGASTRODUODENOSCOPY PERCUTANEOUS ENDOSCOPIC GASTROSTOMY TUBE INSERTION    Medications prior to admission:   Prior to Admission medications    Medication Sig Start Date End Date Taking? Authorizing Provider   alendronate (FOSAMAX) 70 MG tablet Take 1 tablet by mouth every 7 days   Yes Janice Brownlee MD   atorvastatin (LIPITOR) 10 MG tablet TAKE ONE TABLET BY MOUTH DAILY 6/3/22  Yes Janice Brownlee MD   aspirin 81 MG EC tablet Take 1 tablet by mouth daily   Yes Janice Brownlee MD   omeprazole (PRILOSEC) 20 MG delayed release capsule Take 1 capsule by mouth 2 times daily (before meals) 3/25/22  Yes Janice Brownlee MD   CALCIUM-VITAMIN D PO Take 1 tablet by mouth 2 times daily (with meals)   Yes ProviderJanice MD       Current medications:    Current Facility-Administered Medications   Medication Dose Route Frequency Provider Last Rate Last Admin   • ipratropium 0.5 mg-albuterol 2.5 mg (DUONEB) nebulizer solution 1 Dose  1 Dose Inhalation Q4H WA RT Ingrid Guaman MD   1 Dose at 25 1103   • sodium chloride (Inhalant) 3 % nebulizer solution 4 mL  4 mL Nebulization BID Ingrid Guaman MD   4 mL at 25 0755   • amLODIPine (NORVASC) tablet 5 mg  5 mg Oral Nightly Ingrid Guaman MD   5 mg at 25 2105   • lisinopril (PRINIVIL;ZESTRIL) tablet 40 mg  40 mg Per NG tube Daily Jeremiah Sotelo DO   40 mg at 25 0730   • glucose chewable tablet 16 g  4 tablet Oral PRN Ingrid Guaman MD       • dextrose bolus 10% 125 mL  125 mL IntraVENous PRN Ingrid Guaman MD        Or   • dextrose bolus 10% 250 mL  250 mL IntraVENous PRN Ingrid Guaman MD       • glucagon injection 1 mg  1 mg SubCUTAneous PRN Ingrid Guaman MD

## 2025-02-05 NOTE — ANESTHESIA POSTPROCEDURE EVALUATION
Department of Anesthesiology  Postprocedure Note    Patient: Maria Antonia Crawford  MRN: [id number]  YOB: 1945  Date of evaluation: 2/5/2025    Procedure Summary       Date: 02/05/25 Room / Location: [facility]    Anesthesia Start: 1523 Anesthesia Stop: 1544    Procedure: ESOPHAGOGASTRODUODENOSCOPY PERCUTANEOUS ENDOSCOPIC GASTROSTOMY TUBE INSERTION Diagnosis:       Poor nutrition      (Poor nutrition [E63.9])    Surgeons: Patricia Chamberlain MD Responsible Provider: Mickey Atkinson DO    Anesthesia Type: MAC ASA Status: 3            Anesthesia Type: No value filed.    Bita Phase I: Bita Score: 4    Bita Phase II:      Vitals:    02/05/25 1800   BP: (!) 154/71   Pulse: 91   Resp: 18   Temp:    SpO2: 100%       Anesthesia Post Evaluation    Patient location during evaluation: PACU  Patient participation: complete - patient participated  Level of consciousness: awake and awake and alert  Pain score: 0  Airway patency: patent  Nausea & Vomiting: no nausea and no vomiting  Cardiovascular status: hemodynamically stable  Respiratory status: acceptable  Hydration status: euvolemic  Pain management: adequate and satisfactory to patient    No notable events documented.

## 2025-02-05 NOTE — OP NOTE
Endoscopy Note    Patient: Maria Antonia Crawford   : 1945  Acct#:     Procedure: Esophagogastroduodenoscopy with percutaneous endoscopic gastrostomy tube placement.    Date:  2025     Surgeon: Patricia Chamberlain MD, DO      Preoperative Diagnosis:  Pre-Op Diagnosis Codes:      * Poor nutrition [E63.9]      Anesthesia:  MAC.  2 g of Ancef IV        Consent:  The patient or their legal guardian has signed an informed consent, and is aware of the potential risks, benefits, alternatives, and potential complications of this procedure.  These include, but are not limited to hemorrhage, bleeding, post procedural pain, perforation, phlebitis, aspiration, hypotension, hypoxia, cardiovascular events such as arryhthmia, and possibly death.     Procedure: Informed consent was obtained from the patient and the patient's power of  after explanation of indications, benefits, possible risks and complications of the procedure.  The patient was then taken to the endoscopy suite, placed in the supine position with the head of the bed elevated at 45 degrees, and the above IV anesthesia was administered.    An Olympus video endoscope was place in the patient's mouth, and under direct visualization passed into the esophagus.  Visualization of the esophagus demonstrated normal mucosa.  Scope was then advanced into the stomach.  Visualization of the gastric body and antrum demonstrated normal mucosa.   The pylorus was patent, and the scope was advance into the duodenum.   Visualization of the duodenal bulb demonstrated normal mucosa.  The second portion of the duodenum was also normal.  The scope was then withdrawn back into the stomach.    Transillumination and finger denting were accomplished successfully through the skin of the anterior abdominal wall in the left upper quadrant.  This area was marked, prepped and draped in sterile fashion and infiltrated with 1% Xylocaine solution.  A 1 cm transverse skin incision was made

## 2025-02-05 NOTE — CARE COORDINATION
MANUEL and Aileen at [facility] following case- pt does qualify for LTACH but would need EVD removed prior to LTACH. No precert needed for placement. Hospitalist aware. CM will continue to follow.     Possible Peg to be placed today,      SW following    Electronically signed by RAVIN Sullivan on 2/5/2025 at 3:34 PM

## 2025-02-06 ENCOUNTER — APPOINTMENT (OUTPATIENT)
Dept: CT IMAGING | Age: 80
DRG: 003 | End: 2025-02-06
Payer: MEDICARE

## 2025-02-06 PROBLEM — Z71.89 ADVANCE CARE PLANNING: Status: ACTIVE | Noted: 2025-02-06

## 2025-02-06 PROBLEM — Z51.5 ENCOUNTER FOR PALLIATIVE CARE: Status: ACTIVE | Noted: 2025-02-06

## 2025-02-06 LAB
ALBUMIN SERPL-MCNC: 3.2 G/DL (ref 3.4–5)
ANION GAP SERPL CALCULATED.3IONS-SCNC: 8 MMOL/L (ref 3–16)
BACTERIA URNS QL MICRO: ABNORMAL /HPF
BASOPHILS # BLD: 0 K/UL (ref 0–0.2)
BASOPHILS NFR BLD: 0.1 %
BILIRUB UR QL STRIP.AUTO: NEGATIVE
BUN SERPL-MCNC: 16 MG/DL (ref 7–20)
CALCIUM SERPL-MCNC: 9.3 MG/DL (ref 8.3–10.6)
CHLORIDE SERPL-SCNC: 108 MMOL/L (ref 99–110)
CLARITY UR: CLEAR
CO2 SERPL-SCNC: 27 MMOL/L (ref 21–32)
COLOR UR: YELLOW
CREAT SERPL-MCNC: 0.5 MG/DL (ref 0.6–1.2)
DEPRECATED RDW RBC AUTO: 14.4 % (ref 12.4–15.4)
EOSINOPHIL # BLD: 0.1 K/UL (ref 0–0.6)
EOSINOPHIL NFR BLD: 0.4 %
GFR SERPLBLD CREATININE-BSD FMLA CKD-EPI: >90 ML/MIN/{1.73_M2}
GLUCOSE SERPL-MCNC: 112 MG/DL (ref 70–99)
GLUCOSE UR STRIP.AUTO-MCNC: NEGATIVE MG/DL
HCT VFR BLD AUTO: 28 % (ref 36–48)
HGB BLD-MCNC: 9.4 G/DL (ref 12–16)
HGB UR QL STRIP.AUTO: NEGATIVE
KETONES UR STRIP.AUTO-MCNC: ABNORMAL MG/DL
LEUKOCYTE ESTERASE UR QL STRIP.AUTO: NEGATIVE
LYMPHOCYTES # BLD: 0.8 K/UL (ref 1–5.1)
LYMPHOCYTES NFR BLD: 5.6 %
MAGNESIUM SERPL-MCNC: 2.35 MG/DL (ref 1.8–2.4)
MCH RBC QN AUTO: 35 PG (ref 26–34)
MCHC RBC AUTO-ENTMCNC: 33.6 G/DL (ref 31–36)
MCV RBC AUTO: 104.4 FL (ref 80–100)
MONOCYTES # BLD: 0.8 K/UL (ref 0–1.3)
MONOCYTES NFR BLD: 5.4 %
NEUTROPHILS # BLD: 12.3 K/UL (ref 1.7–7.7)
NEUTROPHILS NFR BLD: 88.5 %
NITRITE UR QL STRIP.AUTO: POSITIVE
PH UR STRIP.AUTO: 6 [PH] (ref 5–8)
PHOSPHATE SERPL-MCNC: 2.6 MG/DL (ref 2.5–4.9)
PLATELET # BLD AUTO: 296 K/UL (ref 135–450)
PMV BLD AUTO: 7.1 FL (ref 5–10.5)
POTASSIUM SERPL-SCNC: 3.4 MMOL/L (ref 3.5–5.1)
PROT UR STRIP.AUTO-MCNC: 30 MG/DL
RBC # BLD AUTO: 2.68 M/UL (ref 4–5.2)
RBC #/AREA URNS HPF: ABNORMAL /HPF (ref 0–4)
SODIUM SERPL-SCNC: 143 MMOL/L (ref 136–145)
SP GR UR STRIP.AUTO: 1.02 (ref 1–1.03)
UA COMPLETE W REFLEX CULTURE PNL UR: ABNORMAL
UA DIPSTICK W REFLEX MICRO PNL UR: YES
URN SPEC COLLECT METH UR: ABNORMAL
UROBILINOGEN UR STRIP-ACNC: 0.2 E.U./DL
WBC # BLD AUTO: 13.9 K/UL (ref 4–11)
WBC #/AREA URNS HPF: ABNORMAL /HPF (ref 0–5)

## 2025-02-06 PROCEDURE — 95713 VEEG 2-12 HR CONT MNTR: CPT

## 2025-02-06 PROCEDURE — 2580000003 HC RX 258

## 2025-02-06 PROCEDURE — 6360000002 HC RX W HCPCS

## 2025-02-06 PROCEDURE — 81001 URINALYSIS AUTO W/SCOPE: CPT

## 2025-02-06 PROCEDURE — 94761 N-INVAS EAR/PLS OXIMETRY MLT: CPT

## 2025-02-06 PROCEDURE — 83735 ASSAY OF MAGNESIUM: CPT

## 2025-02-06 PROCEDURE — 87040 BLOOD CULTURE FOR BACTERIA: CPT

## 2025-02-06 PROCEDURE — 6370000000 HC RX 637 (ALT 250 FOR IP)

## 2025-02-06 PROCEDURE — 87070 CULTURE OTHR SPECIMN AEROBIC: CPT

## 2025-02-06 PROCEDURE — 94640 AIRWAY INHALATION TREATMENT: CPT

## 2025-02-06 PROCEDURE — 94003 VENT MGMT INPAT SUBQ DAY: CPT

## 2025-02-06 PROCEDURE — 6370000000 HC RX 637 (ALT 250 FOR IP): Performed by: STUDENT IN AN ORGANIZED HEALTH CARE EDUCATION/TRAINING PROGRAM

## 2025-02-06 PROCEDURE — 2000000000 HC ICU R&B

## 2025-02-06 PROCEDURE — 51798 US URINE CAPACITY MEASURE: CPT

## 2025-02-06 PROCEDURE — 95700 EEG CONT REC W/VID EEG TECH: CPT

## 2025-02-06 PROCEDURE — 51701 INSERT BLADDER CATHETER: CPT

## 2025-02-06 PROCEDURE — 87077 CULTURE AEROBIC IDENTIFY: CPT

## 2025-02-06 PROCEDURE — 70450 CT HEAD/BRAIN W/O DYE: CPT

## 2025-02-06 PROCEDURE — 2500000003 HC RX 250 WO HCPCS

## 2025-02-06 PROCEDURE — 99291 CRITICAL CARE FIRST HOUR: CPT | Performed by: INTERNAL MEDICINE

## 2025-02-06 PROCEDURE — 87186 SC STD MICRODIL/AGAR DIL: CPT

## 2025-02-06 PROCEDURE — 99291 CRITICAL CARE FIRST HOUR: CPT | Performed by: NURSE PRACTITIONER

## 2025-02-06 PROCEDURE — 87205 SMEAR GRAM STAIN: CPT

## 2025-02-06 PROCEDURE — 80069 RENAL FUNCTION PANEL: CPT

## 2025-02-06 PROCEDURE — 2700000000 HC OXYGEN THERAPY PER DAY

## 2025-02-06 PROCEDURE — 85025 COMPLETE CBC W/AUTO DIFF WBC: CPT

## 2025-02-06 PROCEDURE — 36415 COLL VENOUS BLD VENIPUNCTURE: CPT

## 2025-02-06 PROCEDURE — 99221 1ST HOSP IP/OBS SF/LOW 40: CPT | Performed by: NURSE PRACTITIONER

## 2025-02-06 RX ADMIN — LABETALOL HYDROCHLORIDE 10 MG: 5 INJECTION, SOLUTION INTRAVENOUS at 06:31

## 2025-02-06 RX ADMIN — HYDRALAZINE HYDROCHLORIDE 5 MG: 20 INJECTION INTRAMUSCULAR; INTRAVENOUS at 16:18

## 2025-02-06 RX ADMIN — POLYETHYLENE GLYCOL 3350 17 G: 17 POWDER, FOR SOLUTION ORAL at 20:35

## 2025-02-06 RX ADMIN — LABETALOL HYDROCHLORIDE 10 MG: 5 INJECTION, SOLUTION INTRAVENOUS at 16:06

## 2025-02-06 RX ADMIN — Medication 15 ML: at 07:33

## 2025-02-06 RX ADMIN — SODIUM CHLORIDE 30 MG/ML INHALATION SOLUTION 4 ML: 30 SOLUTION INHALANT at 08:33

## 2025-02-06 RX ADMIN — IPRATROPIUM BROMIDE AND ALBUTEROL SULFATE 1 DOSE: 2.5; .5 SOLUTION RESPIRATORY (INHALATION) at 12:22

## 2025-02-06 RX ADMIN — AMLODIPINE BESYLATE 5 MG: 5 TABLET ORAL at 20:35

## 2025-02-06 RX ADMIN — SODIUM CHLORIDE, PRESERVATIVE FREE 40 MG: 5 INJECTION INTRAVENOUS at 07:32

## 2025-02-06 RX ADMIN — SENNOSIDES 17.2 MG: 8.6 TABLET, COATED ORAL at 07:32

## 2025-02-06 RX ADMIN — ACETAMINOPHEN 650 MG: 325 TABLET ORAL at 20:35

## 2025-02-06 RX ADMIN — SODIUM CHLORIDE, PRESERVATIVE FREE 10 ML: 5 INJECTION INTRAVENOUS at 07:32

## 2025-02-06 RX ADMIN — SODIUM CHLORIDE 30 MG/ML INHALATION SOLUTION 4 ML: 30 SOLUTION INHALANT at 22:17

## 2025-02-06 RX ADMIN — LISINOPRIL 40 MG: 40 TABLET ORAL at 07:32

## 2025-02-06 RX ADMIN — FOLIC ACID 1 MG: 1 TABLET ORAL at 07:32

## 2025-02-06 RX ADMIN — IPRATROPIUM BROMIDE AND ALBUTEROL SULFATE 1 DOSE: 2.5; .5 SOLUTION RESPIRATORY (INHALATION) at 08:33

## 2025-02-06 RX ADMIN — SODIUM CHLORIDE, PRESERVATIVE FREE 10 ML: 5 INJECTION INTRAVENOUS at 20:35

## 2025-02-06 RX ADMIN — Medication: at 07:32

## 2025-02-06 RX ADMIN — POTASSIUM BICARBONATE 20 MEQ: 782 TABLET, EFFERVESCENT ORAL at 06:18

## 2025-02-06 RX ADMIN — IPRATROPIUM BROMIDE AND ALBUTEROL SULFATE 1 DOSE: 2.5; .5 SOLUTION RESPIRATORY (INHALATION) at 22:17

## 2025-02-06 RX ADMIN — IPRATROPIUM BROMIDE AND ALBUTEROL SULFATE 1 DOSE: 2.5; .5 SOLUTION RESPIRATORY (INHALATION) at 15:33

## 2025-02-06 RX ADMIN — POLYETHYLENE GLYCOL 3350 17 G: 17 POWDER, FOR SOLUTION ORAL at 07:32

## 2025-02-06 RX ADMIN — Medication: at 20:36

## 2025-02-06 RX ADMIN — SENNOSIDES 17.2 MG: 8.6 TABLET, COATED ORAL at 20:35

## 2025-02-06 RX ADMIN — MODAFINIL 200 MG: 100 TABLET ORAL at 07:32

## 2025-02-06 ASSESSMENT — PULMONARY FUNCTION TESTS
PIF_VALUE: 15
PIF_VALUE: 25
PIF_VALUE: 16
PIF_VALUE: 15
PIF_VALUE: 21
PIF_VALUE: 18
PIF_VALUE: 22
PIF_VALUE: 15
PIF_VALUE: 17
PIF_VALUE: 15
PIF_VALUE: 15
PIF_VALUE: 20
PIF_VALUE: 18
PIF_VALUE: 15
PIF_VALUE: 17
PIF_VALUE: 15
PIF_VALUE: 15
PIF_VALUE: 16
PIF_VALUE: 15
PIF_VALUE: 17
PIF_VALUE: 15
PIF_VALUE: 17
PIF_VALUE: 15
PIF_VALUE: 15

## 2025-02-06 ASSESSMENT — PAIN SCALES - GENERAL
PAINLEVEL_OUTOF10: 0

## 2025-02-06 NOTE — CARE COORDINATION
Discharge Planning:    JAZMINE spoke with family at bedside regarding DCP- discussed LTACH and location options. Preference of [facility] at [facility]- agreeable to speak with Aileen from [facility] this afternoon. Aileen made aware and meeting with family now.     Thank you  Cat Fco HERNANDEZ, BSN,   ICU   [phone]

## 2025-02-07 ENCOUNTER — APPOINTMENT (OUTPATIENT)
Dept: GENERAL RADIOLOGY | Age: 80
DRG: 003 | End: 2025-02-07
Payer: MEDICARE

## 2025-02-07 PROBLEM — Z45.41 ENCOUNTER FOR MANAGEMENT OF CEREBROSPINAL FLUID DRAINAGE DEVICE: Status: ACTIVE | Noted: 2025-02-07

## 2025-02-07 LAB
ALBUMIN SERPL-MCNC: 3.1 G/DL (ref 3.4–5)
ANION GAP SERPL CALCULATED.3IONS-SCNC: 9 MMOL/L (ref 3–16)
BASOPHILS # BLD: 0 K/UL (ref 0–0.2)
BASOPHILS NFR BLD: 0.2 %
BUN SERPL-MCNC: 27 MG/DL (ref 7–20)
CALCIUM SERPL-MCNC: 9.4 MG/DL (ref 8.3–10.6)
CHLORIDE SERPL-SCNC: 108 MMOL/L (ref 99–110)
CO2 SERPL-SCNC: 28 MMOL/L (ref 21–32)
CREAT SERPL-MCNC: 0.6 MG/DL (ref 0.6–1.2)
DEPRECATED RDW RBC AUTO: 14.7 % (ref 12.4–15.4)
EOSINOPHIL # BLD: 0 K/UL (ref 0–0.6)
EOSINOPHIL NFR BLD: 0.3 %
GFR SERPLBLD CREATININE-BSD FMLA CKD-EPI: >90 ML/MIN/{1.73_M2}
GLUCOSE SERPL-MCNC: 150 MG/DL (ref 70–99)
HCT VFR BLD AUTO: 25.8 % (ref 36–48)
HGB BLD-MCNC: 8.3 G/DL (ref 12–16)
LYMPHOCYTES # BLD: 1.3 K/UL (ref 1–5.1)
LYMPHOCYTES NFR BLD: 9 %
MAGNESIUM SERPL-MCNC: 2.45 MG/DL (ref 1.8–2.4)
MCH RBC QN AUTO: 34.3 PG (ref 26–34)
MCHC RBC AUTO-ENTMCNC: 32.2 G/DL (ref 31–36)
MCV RBC AUTO: 106.6 FL (ref 80–100)
MONOCYTES # BLD: 0.7 K/UL (ref 0–1.3)
MONOCYTES NFR BLD: 4.8 %
NEUTROPHILS # BLD: 12.7 K/UL (ref 1.7–7.7)
NEUTROPHILS NFR BLD: 85.7 %
PHOSPHATE SERPL-MCNC: 2.6 MG/DL (ref 2.5–4.9)
PLATELET # BLD AUTO: 388 K/UL (ref 135–450)
PMV BLD AUTO: 7.4 FL (ref 5–10.5)
POTASSIUM SERPL-SCNC: 3.3 MMOL/L (ref 3.5–5.1)
RBC # BLD AUTO: 2.42 M/UL (ref 4–5.2)
SODIUM SERPL-SCNC: 145 MMOL/L (ref 136–145)
WBC # BLD AUTO: 14.9 K/UL (ref 4–11)

## 2025-02-07 PROCEDURE — 6370000000 HC RX 637 (ALT 250 FOR IP): Performed by: STUDENT IN AN ORGANIZED HEALTH CARE EDUCATION/TRAINING PROGRAM

## 2025-02-07 PROCEDURE — 2580000003 HC RX 258

## 2025-02-07 PROCEDURE — 6360000002 HC RX W HCPCS

## 2025-02-07 PROCEDURE — 51798 US URINE CAPACITY MEASURE: CPT

## 2025-02-07 PROCEDURE — 2700000000 HC OXYGEN THERAPY PER DAY

## 2025-02-07 PROCEDURE — 80069 RENAL FUNCTION PANEL: CPT

## 2025-02-07 PROCEDURE — 85025 COMPLETE CBC W/AUTO DIFF WBC: CPT

## 2025-02-07 PROCEDURE — 36415 COLL VENOUS BLD VENIPUNCTURE: CPT

## 2025-02-07 PROCEDURE — 2500000003 HC RX 250 WO HCPCS

## 2025-02-07 PROCEDURE — 99291 CRITICAL CARE FIRST HOUR: CPT | Performed by: INTERNAL MEDICINE

## 2025-02-07 PROCEDURE — 71045 X-RAY EXAM CHEST 1 VIEW: CPT

## 2025-02-07 PROCEDURE — 95716 VEEG EA 12-26HR CONT MNTR: CPT

## 2025-02-07 PROCEDURE — 2000000000 HC ICU R&B

## 2025-02-07 PROCEDURE — 6370000000 HC RX 637 (ALT 250 FOR IP)

## 2025-02-07 PROCEDURE — 51701 INSERT BLADDER CATHETER: CPT

## 2025-02-07 PROCEDURE — 95720 EEG PHY/QHP EA INCR W/VEEG: CPT | Performed by: PSYCHIATRY & NEUROLOGY

## 2025-02-07 PROCEDURE — 99291 CRITICAL CARE FIRST HOUR: CPT | Performed by: NURSE PRACTITIONER

## 2025-02-07 PROCEDURE — 94003 VENT MGMT INPAT SUBQ DAY: CPT

## 2025-02-07 PROCEDURE — 94761 N-INVAS EAR/PLS OXIMETRY MLT: CPT

## 2025-02-07 PROCEDURE — 94640 AIRWAY INHALATION TREATMENT: CPT

## 2025-02-07 PROCEDURE — 83735 ASSAY OF MAGNESIUM: CPT

## 2025-02-07 RX ORDER — POTASSIUM CHLORIDE 7.45 MG/ML
10 INJECTION INTRAVENOUS
Status: COMPLETED | OUTPATIENT
Start: 2025-02-07 | End: 2025-02-07

## 2025-02-07 RX ORDER — LEVETIRACETAM 500 MG/5ML
500 INJECTION, SOLUTION, CONCENTRATE INTRAVENOUS EVERY 12 HOURS
Status: DISCONTINUED | OUTPATIENT
Start: 2025-02-07 | End: 2025-02-12 | Stop reason: HOSPADM

## 2025-02-07 RX ADMIN — SODIUM CHLORIDE 30 MG/ML INHALATION SOLUTION 4 ML: 30 SOLUTION INHALANT at 20:29

## 2025-02-07 RX ADMIN — IPRATROPIUM BROMIDE AND ALBUTEROL SULFATE 1 DOSE: 2.5; .5 SOLUTION RESPIRATORY (INHALATION) at 20:29

## 2025-02-07 RX ADMIN — SODIUM CHLORIDE, PRESERVATIVE FREE 40 MG: 5 INJECTION INTRAVENOUS at 08:10

## 2025-02-07 RX ADMIN — POLYETHYLENE GLYCOL 3350 17 G: 17 POWDER, FOR SOLUTION ORAL at 08:17

## 2025-02-07 RX ADMIN — MODAFINIL 200 MG: 100 TABLET ORAL at 08:13

## 2025-02-07 RX ADMIN — IPRATROPIUM BROMIDE AND ALBUTEROL SULFATE 1 DOSE: 2.5; .5 SOLUTION RESPIRATORY (INHALATION) at 16:47

## 2025-02-07 RX ADMIN — LABETALOL HYDROCHLORIDE 10 MG: 5 INJECTION, SOLUTION INTRAVENOUS at 17:18

## 2025-02-07 RX ADMIN — SENNOSIDES 17.2 MG: 8.6 TABLET, COATED ORAL at 08:13

## 2025-02-07 RX ADMIN — POTASSIUM CHLORIDE 10 MEQ: 7.46 INJECTION, SOLUTION INTRAVENOUS at 06:29

## 2025-02-07 RX ADMIN — IPRATROPIUM BROMIDE AND ALBUTEROL SULFATE 1 DOSE: 2.5; .5 SOLUTION RESPIRATORY (INHALATION) at 07:56

## 2025-02-07 RX ADMIN — LEVETIRACETAM 500 MG: 100 INJECTION INTRAVENOUS at 08:15

## 2025-02-07 RX ADMIN — AMLODIPINE BESYLATE 5 MG: 5 TABLET ORAL at 21:05

## 2025-02-07 RX ADMIN — Medication: at 08:24

## 2025-02-07 RX ADMIN — Medication 15 ML: at 08:23

## 2025-02-07 RX ADMIN — LISINOPRIL 40 MG: 40 TABLET ORAL at 08:14

## 2025-02-07 RX ADMIN — IPRATROPIUM BROMIDE AND ALBUTEROL SULFATE 1 DOSE: 2.5; .5 SOLUTION RESPIRATORY (INHALATION) at 11:04

## 2025-02-07 RX ADMIN — SODIUM CHLORIDE 30 MG/ML INHALATION SOLUTION 4 ML: 30 SOLUTION INHALANT at 07:56

## 2025-02-07 RX ADMIN — Medication: at 21:22

## 2025-02-07 RX ADMIN — SODIUM CHLORIDE, PRESERVATIVE FREE 10 ML: 5 INJECTION INTRAVENOUS at 08:15

## 2025-02-07 RX ADMIN — HEPARIN SODIUM 5000 UNITS: 5000 INJECTION INTRAVENOUS; SUBCUTANEOUS at 14:39

## 2025-02-07 RX ADMIN — POTASSIUM BICARBONATE 20 MEQ: 782 TABLET, EFFERVESCENT ORAL at 06:30

## 2025-02-07 RX ADMIN — FOLIC ACID 1 MG: 1 TABLET ORAL at 08:13

## 2025-02-07 RX ADMIN — LEVETIRACETAM 500 MG: 100 INJECTION INTRAVENOUS at 19:52

## 2025-02-07 RX ADMIN — POTASSIUM CHLORIDE 10 MEQ: 7.46 INJECTION, SOLUTION INTRAVENOUS at 08:36

## 2025-02-07 RX ADMIN — SODIUM CHLORIDE, PRESERVATIVE FREE 10 ML: 5 INJECTION INTRAVENOUS at 19:53

## 2025-02-07 RX ADMIN — HEPARIN SODIUM 5000 UNITS: 5000 INJECTION INTRAVENOUS; SUBCUTANEOUS at 22:23

## 2025-02-07 ASSESSMENT — PULMONARY FUNCTION TESTS
PIF_VALUE: 19
PIF_VALUE: 15
PIF_VALUE: 15
PIF_VALUE: 20
PIF_VALUE: 15
PIF_VALUE: 20
PIF_VALUE: 15
PIF_VALUE: 16
PIF_VALUE: 15
PIF_VALUE: 20
PIF_VALUE: 15
PIF_VALUE: 15
PIF_VALUE: 17
PIF_VALUE: 20
PIF_VALUE: 15
PIF_VALUE: 19
PIF_VALUE: 16
PIF_VALUE: 15
PIF_VALUE: 18
PIF_VALUE: 15

## 2025-02-07 ASSESSMENT — PAIN SCALES - GENERAL
PAINLEVEL_OUTOF10: 0
PAINLEVEL_OUTOF10: 0

## 2025-02-07 NOTE — CARE COORDINATION
Patient is from home with . Patient remains intubated and sedated. EVD remains. Palliative care met with family and they would like to monitor her progress over the next few days. Referral made with [facility] but they do not want her to go there as she is now.     Electronically signed by Izabela Astorga RN on 2/7/2025 at 6:09 PM  [phone]

## 2025-02-08 LAB
ALBUMIN SERPL-MCNC: 3.3 G/DL (ref 3.4–5)
ANION GAP SERPL CALCULATED.3IONS-SCNC: 14 MMOL/L (ref 3–16)
APPEARANCE CSF: CLEAR
BUN SERPL-MCNC: 21 MG/DL (ref 7–20)
CALCIUM SERPL-MCNC: 9.7 MG/DL (ref 8.3–10.6)
CHLORIDE SERPL-SCNC: 109 MMOL/L (ref 99–110)
CLOT EVALUATION CSF: ABNORMAL
CO2 SERPL-SCNC: 23 MMOL/L (ref 21–32)
COLOR CSF: COLORLESS
CREAT SERPL-MCNC: 0.5 MG/DL (ref 0.6–1.2)
GFR SERPLBLD CREATININE-BSD FMLA CKD-EPI: >90 ML/MIN/{1.73_M2}
GLUCOSE BLD-MCNC: 109 MG/DL (ref 70–99)
GLUCOSE BLD-MCNC: 134 MG/DL (ref 70–99)
GLUCOSE CSF-MCNC: 83 MG/DL (ref 40–80)
GLUCOSE SERPL-MCNC: 129 MG/DL (ref 70–99)
MAGNESIUM SERPL-MCNC: 2.27 MG/DL (ref 1.8–2.4)
MANUAL DIF COMMENT CSF-IMP: ABNORMAL
NUC CELL # FLD MANUAL: 7 /CUMM (ref 0–5)
PERFORMED ON: ABNORMAL
PERFORMED ON: ABNORMAL
PHOSPHATE SERPL-MCNC: 2.7 MG/DL (ref 2.5–4.9)
POTASSIUM SERPL-SCNC: 4.6 MMOL/L (ref 3.5–5.1)
PROT CSF-MCNC: 24 MG/DL (ref 15–45)
RBC # FLD MANUAL: 1500 /CUMM
SODIUM SERPL-SCNC: 146 MMOL/L (ref 136–145)
TUBE # CSF: ABNORMAL

## 2025-02-08 PROCEDURE — 83735 ASSAY OF MAGNESIUM: CPT

## 2025-02-08 PROCEDURE — 94761 N-INVAS EAR/PLS OXIMETRY MLT: CPT

## 2025-02-08 PROCEDURE — 2000000000 HC ICU R&B

## 2025-02-08 PROCEDURE — 51798 US URINE CAPACITY MEASURE: CPT

## 2025-02-08 PROCEDURE — 6370000000 HC RX 637 (ALT 250 FOR IP)

## 2025-02-08 PROCEDURE — 2580000003 HC RX 258

## 2025-02-08 PROCEDURE — 6360000002 HC RX W HCPCS

## 2025-02-08 PROCEDURE — 2700000000 HC OXYGEN THERAPY PER DAY

## 2025-02-08 PROCEDURE — 94640 AIRWAY INHALATION TREATMENT: CPT

## 2025-02-08 PROCEDURE — 99291 CRITICAL CARE FIRST HOUR: CPT | Performed by: INTERNAL MEDICINE

## 2025-02-08 PROCEDURE — 87070 CULTURE OTHR SPECIMN AEROBIC: CPT

## 2025-02-08 PROCEDURE — 89050 BODY FLUID CELL COUNT: CPT

## 2025-02-08 PROCEDURE — APPNB45 APP NON BILLABLE 31-45 MINUTES: Performed by: NURSE PRACTITIONER

## 2025-02-08 PROCEDURE — 82945 GLUCOSE OTHER FLUID: CPT

## 2025-02-08 PROCEDURE — 36415 COLL VENOUS BLD VENIPUNCTURE: CPT

## 2025-02-08 PROCEDURE — 2500000003 HC RX 250 WO HCPCS

## 2025-02-08 PROCEDURE — 84157 ASSAY OF PROTEIN OTHER: CPT

## 2025-02-08 PROCEDURE — 99024 POSTOP FOLLOW-UP VISIT: CPT | Performed by: NURSE PRACTITIONER

## 2025-02-08 PROCEDURE — 95720 EEG PHY/QHP EA INCR W/VEEG: CPT | Performed by: PSYCHIATRY & NEUROLOGY

## 2025-02-08 PROCEDURE — 99291 CRITICAL CARE FIRST HOUR: CPT | Performed by: NURSE PRACTITIONER

## 2025-02-08 PROCEDURE — 95716 VEEG EA 12-26HR CONT MNTR: CPT

## 2025-02-08 PROCEDURE — 80069 RENAL FUNCTION PANEL: CPT

## 2025-02-08 PROCEDURE — 94003 VENT MGMT INPAT SUBQ DAY: CPT

## 2025-02-08 PROCEDURE — 87205 SMEAR GRAM STAIN: CPT

## 2025-02-08 RX ORDER — IPRATROPIUM BROMIDE AND ALBUTEROL SULFATE 2.5; .5 MG/3ML; MG/3ML
1 SOLUTION RESPIRATORY (INHALATION) EVERY 4 HOURS PRN
Status: DISCONTINUED | OUTPATIENT
Start: 2025-02-08 | End: 2025-02-12 | Stop reason: HOSPADM

## 2025-02-08 RX ADMIN — SODIUM CHLORIDE 30 MG/ML INHALATION SOLUTION 4 ML: 30 SOLUTION INHALANT at 08:24

## 2025-02-08 RX ADMIN — HEPARIN SODIUM 5000 UNITS: 5000 INJECTION INTRAVENOUS; SUBCUTANEOUS at 21:38

## 2025-02-08 RX ADMIN — SODIUM CHLORIDE, PRESERVATIVE FREE 10 ML: 5 INJECTION INTRAVENOUS at 09:16

## 2025-02-08 RX ADMIN — LEVETIRACETAM 500 MG: 100 INJECTION INTRAVENOUS at 19:41

## 2025-02-08 RX ADMIN — IPRATROPIUM BROMIDE AND ALBUTEROL SULFATE 1 DOSE: 2.5; .5 SOLUTION RESPIRATORY (INHALATION) at 16:51

## 2025-02-08 RX ADMIN — SODIUM CHLORIDE, PRESERVATIVE FREE 10 ML: 5 INJECTION INTRAVENOUS at 21:22

## 2025-02-08 RX ADMIN — LABETALOL HYDROCHLORIDE 10 MG: 5 INJECTION, SOLUTION INTRAVENOUS at 02:20

## 2025-02-08 RX ADMIN — POLYETHYLENE GLYCOL 3350 17 G: 17 POWDER, FOR SOLUTION ORAL at 09:15

## 2025-02-08 RX ADMIN — SODIUM CHLORIDE, PRESERVATIVE FREE 40 MG: 5 INJECTION INTRAVENOUS at 09:15

## 2025-02-08 RX ADMIN — AMLODIPINE BESYLATE 5 MG: 5 TABLET ORAL at 21:22

## 2025-02-08 RX ADMIN — Medication 15 ML: at 09:15

## 2025-02-08 RX ADMIN — IPRATROPIUM BROMIDE AND ALBUTEROL SULFATE 1 DOSE: 2.5; .5 SOLUTION RESPIRATORY (INHALATION) at 20:29

## 2025-02-08 RX ADMIN — IPRATROPIUM BROMIDE AND ALBUTEROL SULFATE 1 DOSE: 2.5; .5 SOLUTION RESPIRATORY (INHALATION) at 08:24

## 2025-02-08 RX ADMIN — FOLIC ACID 1 MG: 1 TABLET ORAL at 09:15

## 2025-02-08 RX ADMIN — SENNOSIDES 17.2 MG: 8.6 TABLET, COATED ORAL at 09:15

## 2025-02-08 RX ADMIN — LEVETIRACETAM 500 MG: 100 INJECTION INTRAVENOUS at 08:13

## 2025-02-08 RX ADMIN — Medication: at 21:38

## 2025-02-08 RX ADMIN — LISINOPRIL 40 MG: 40 TABLET ORAL at 09:15

## 2025-02-08 RX ADMIN — IPRATROPIUM BROMIDE AND ALBUTEROL SULFATE 1 DOSE: 2.5; .5 SOLUTION RESPIRATORY (INHALATION) at 23:50

## 2025-02-08 RX ADMIN — IPRATROPIUM BROMIDE AND ALBUTEROL SULFATE 1 DOSE: 2.5; .5 SOLUTION RESPIRATORY (INHALATION) at 12:07

## 2025-02-08 RX ADMIN — HEPARIN SODIUM 5000 UNITS: 5000 INJECTION INTRAVENOUS; SUBCUTANEOUS at 06:01

## 2025-02-08 RX ADMIN — Medication: at 09:16

## 2025-02-08 RX ADMIN — HEPARIN SODIUM 5000 UNITS: 5000 INJECTION INTRAVENOUS; SUBCUTANEOUS at 14:17

## 2025-02-08 RX ADMIN — ACETAMINOPHEN 650 MG: 325 TABLET ORAL at 12:21

## 2025-02-08 RX ADMIN — SODIUM CHLORIDE 30 MG/ML INHALATION SOLUTION 4 ML: 30 SOLUTION INHALANT at 20:29

## 2025-02-08 RX ADMIN — HYDRALAZINE HYDROCHLORIDE 5 MG: 20 INJECTION INTRAMUSCULAR; INTRAVENOUS at 06:00

## 2025-02-08 ASSESSMENT — PULMONARY FUNCTION TESTS
PIF_VALUE: 22
PIF_VALUE: 19
PIF_VALUE: 15
PIF_VALUE: 27
PIF_VALUE: 16
PIF_VALUE: 18
PIF_VALUE: 15
PIF_VALUE: 28
PIF_VALUE: 19
PIF_VALUE: 15
PIF_VALUE: 15
PIF_VALUE: 17
PIF_VALUE: 15
PIF_VALUE: 19
PIF_VALUE: 15
PIF_VALUE: 18
PIF_VALUE: 16
PIF_VALUE: 16
PIF_VALUE: 17
PIF_VALUE: 20
PIF_VALUE: 29
PIF_VALUE: 16
PIF_VALUE: 29
PIF_VALUE: 15
PIF_VALUE: 15
PIF_VALUE: 22
PIF_VALUE: 20
PIF_VALUE: 15
PIF_VALUE: 19

## 2025-02-08 ASSESSMENT — PAIN SCALES - GENERAL
PAINLEVEL_OUTOF10: 0
PAINLEVEL_OUTOF10: 0

## 2025-02-08 ASSESSMENT — PAIN SCALES - WONG BAKER
WONGBAKER_NUMERICALRESPONSE: NO HURT
WONGBAKER_NUMERICALRESPONSE: NO HURT

## 2025-02-09 LAB
ALBUMIN SERPL-MCNC: 2.9 G/DL (ref 3.4–5)
ANION GAP SERPL CALCULATED.3IONS-SCNC: 10 MMOL/L (ref 3–16)
BASOPHILS # BLD: 0 K/UL (ref 0–0.2)
BASOPHILS NFR BLD: 0.4 %
BUN SERPL-MCNC: 24 MG/DL (ref 7–20)
CALCIUM SERPL-MCNC: 9.1 MG/DL (ref 8.3–10.6)
CHLORIDE SERPL-SCNC: 110 MMOL/L (ref 99–110)
CO2 SERPL-SCNC: 26 MMOL/L (ref 21–32)
CREAT SERPL-MCNC: 0.5 MG/DL (ref 0.6–1.2)
DEPRECATED RDW RBC AUTO: 14.8 % (ref 12.4–15.4)
EOSINOPHIL # BLD: 0.2 K/UL (ref 0–0.6)
EOSINOPHIL NFR BLD: 2.1 %
GFR SERPLBLD CREATININE-BSD FMLA CKD-EPI: >90 ML/MIN/{1.73_M2}
GLUCOSE SERPL-MCNC: 140 MG/DL (ref 70–99)
HCT VFR BLD AUTO: 27.4 % (ref 36–48)
HGB BLD-MCNC: 8.9 G/DL (ref 12–16)
LYMPHOCYTES # BLD: 0.8 K/UL (ref 1–5.1)
LYMPHOCYTES NFR BLD: 8.1 %
MAGNESIUM SERPL-MCNC: 2.29 MG/DL (ref 1.8–2.4)
MCH RBC QN AUTO: 34.9 PG (ref 26–34)
MCHC RBC AUTO-ENTMCNC: 32.6 G/DL (ref 31–36)
MCV RBC AUTO: 107.1 FL (ref 80–100)
MONOCYTES # BLD: 0.7 K/UL (ref 0–1.3)
MONOCYTES NFR BLD: 6.7 %
NEUTROPHILS # BLD: 8.1 K/UL (ref 1.7–7.7)
NEUTROPHILS NFR BLD: 82.7 %
PHOSPHATE SERPL-MCNC: 3.1 MG/DL (ref 2.5–4.9)
PLATELET # BLD AUTO: 318 K/UL (ref 135–450)
PMV BLD AUTO: 6.9 FL (ref 5–10.5)
POTASSIUM SERPL-SCNC: 4 MMOL/L (ref 3.5–5.1)
RBC # BLD AUTO: 2.56 M/UL (ref 4–5.2)
SODIUM SERPL-SCNC: 146 MMOL/L (ref 136–145)
WBC # BLD AUTO: 9.8 K/UL (ref 4–11)

## 2025-02-09 PROCEDURE — 6370000000 HC RX 637 (ALT 250 FOR IP)

## 2025-02-09 PROCEDURE — 6360000002 HC RX W HCPCS

## 2025-02-09 PROCEDURE — 83735 ASSAY OF MAGNESIUM: CPT

## 2025-02-09 PROCEDURE — 99024 POSTOP FOLLOW-UP VISIT: CPT | Performed by: NURSE PRACTITIONER

## 2025-02-09 PROCEDURE — 51701 INSERT BLADDER CATHETER: CPT

## 2025-02-09 PROCEDURE — 2580000003 HC RX 258

## 2025-02-09 PROCEDURE — 80069 RENAL FUNCTION PANEL: CPT

## 2025-02-09 PROCEDURE — APPNB45 APP NON BILLABLE 31-45 MINUTES: Performed by: NURSE PRACTITIONER

## 2025-02-09 PROCEDURE — 36415 COLL VENOUS BLD VENIPUNCTURE: CPT

## 2025-02-09 PROCEDURE — 2500000003 HC RX 250 WO HCPCS

## 2025-02-09 PROCEDURE — 2000000000 HC ICU R&B

## 2025-02-09 PROCEDURE — 51798 US URINE CAPACITY MEASURE: CPT

## 2025-02-09 PROCEDURE — 94003 VENT MGMT INPAT SUBQ DAY: CPT

## 2025-02-09 PROCEDURE — 94761 N-INVAS EAR/PLS OXIMETRY MLT: CPT

## 2025-02-09 PROCEDURE — 99291 CRITICAL CARE FIRST HOUR: CPT | Performed by: INTERNAL MEDICINE

## 2025-02-09 PROCEDURE — 94640 AIRWAY INHALATION TREATMENT: CPT

## 2025-02-09 PROCEDURE — 85025 COMPLETE CBC W/AUTO DIFF WBC: CPT

## 2025-02-09 PROCEDURE — 99291 CRITICAL CARE FIRST HOUR: CPT | Performed by: NURSE PRACTITIONER

## 2025-02-09 PROCEDURE — 2700000000 HC OXYGEN THERAPY PER DAY

## 2025-02-09 RX ORDER — AMLODIPINE BESYLATE 10 MG/1
10 TABLET ORAL NIGHTLY
Status: DISCONTINUED | OUTPATIENT
Start: 2025-02-09 | End: 2025-02-10

## 2025-02-09 RX ORDER — VANCOMYCIN 1.25 G/250ML
1750 INJECTION, SOLUTION INTRAVENOUS ONCE
Status: COMPLETED | OUTPATIENT
Start: 2025-02-09 | End: 2025-02-09

## 2025-02-09 RX ORDER — MEROPENEM AND SODIUM CHLORIDE 1 G/50ML
1000 INJECTION, SOLUTION INTRAVENOUS EVERY 8 HOURS
Status: DISCONTINUED | OUTPATIENT
Start: 2025-02-09 | End: 2025-02-09

## 2025-02-09 RX ORDER — MEROPENEM AND SODIUM CHLORIDE 1 G/50ML
1000 INJECTION, SOLUTION INTRAVENOUS ONCE
Status: DISCONTINUED | OUTPATIENT
Start: 2025-02-09 | End: 2025-02-09

## 2025-02-09 RX ORDER — VANCOMYCIN HCL IN 5 % DEXTROSE 1.25 G/25
1250 PLASTIC BAG, INJECTION (ML) INTRAVENOUS EVERY 12 HOURS
Status: DISCONTINUED | OUTPATIENT
Start: 2025-02-09 | End: 2025-02-10 | Stop reason: DRUGHIGH

## 2025-02-09 RX ADMIN — Medication: at 22:02

## 2025-02-09 RX ADMIN — SODIUM CHLORIDE 30 MG/ML INHALATION SOLUTION 4 ML: 30 SOLUTION INHALANT at 08:43

## 2025-02-09 RX ADMIN — LABETALOL HYDROCHLORIDE 10 MG: 5 INJECTION, SOLUTION INTRAVENOUS at 19:48

## 2025-02-09 RX ADMIN — SODIUM CHLORIDE, PRESERVATIVE FREE 40 MG: 5 INJECTION INTRAVENOUS at 08:25

## 2025-02-09 RX ADMIN — Medication 15 ML: at 08:25

## 2025-02-09 RX ADMIN — IPRATROPIUM BROMIDE AND ALBUTEROL SULFATE 1 DOSE: 2.5; .5 SOLUTION RESPIRATORY (INHALATION) at 12:12

## 2025-02-09 RX ADMIN — SODIUM CHLORIDE, PRESERVATIVE FREE 10 ML: 5 INJECTION INTRAVENOUS at 22:01

## 2025-02-09 RX ADMIN — FOLIC ACID 1 MG: 1 TABLET ORAL at 08:25

## 2025-02-09 RX ADMIN — HEPARIN SODIUM 5000 UNITS: 5000 INJECTION INTRAVENOUS; SUBCUTANEOUS at 21:44

## 2025-02-09 RX ADMIN — AMLODIPINE BESYLATE 10 MG: 10 TABLET ORAL at 21:44

## 2025-02-09 RX ADMIN — SODIUM CHLORIDE, PRESERVATIVE FREE 10 ML: 5 INJECTION INTRAVENOUS at 21:44

## 2025-02-09 RX ADMIN — SENNOSIDES 17.2 MG: 8.6 TABLET, COATED ORAL at 08:25

## 2025-02-09 RX ADMIN — WATER 1000 MG: 1 INJECTION INTRAMUSCULAR; INTRAVENOUS; SUBCUTANEOUS at 11:04

## 2025-02-09 RX ADMIN — SODIUM CHLORIDE 30 MG/ML INHALATION SOLUTION 4 ML: 30 SOLUTION INHALANT at 20:31

## 2025-02-09 RX ADMIN — POLYETHYLENE GLYCOL 3350 17 G: 17 POWDER, FOR SOLUTION ORAL at 08:25

## 2025-02-09 RX ADMIN — SODIUM CHLORIDE, PRESERVATIVE FREE 10 ML: 5 INJECTION INTRAVENOUS at 08:26

## 2025-02-09 RX ADMIN — LEVETIRACETAM 500 MG: 100 INJECTION INTRAVENOUS at 08:25

## 2025-02-09 RX ADMIN — VANCOMYCIN 1750 MG: 1.25 INJECTION, SOLUTION INTRAVENOUS at 12:28

## 2025-02-09 RX ADMIN — IPRATROPIUM BROMIDE AND ALBUTEROL SULFATE 1 DOSE: 2.5; .5 SOLUTION RESPIRATORY (INHALATION) at 20:31

## 2025-02-09 RX ADMIN — IPRATROPIUM BROMIDE AND ALBUTEROL SULFATE 1 DOSE: 2.5; .5 SOLUTION RESPIRATORY (INHALATION) at 16:28

## 2025-02-09 RX ADMIN — HEPARIN SODIUM 5000 UNITS: 5000 INJECTION INTRAVENOUS; SUBCUTANEOUS at 06:24

## 2025-02-09 RX ADMIN — IPRATROPIUM BROMIDE AND ALBUTEROL SULFATE 1 DOSE: 2.5; .5 SOLUTION RESPIRATORY (INHALATION) at 08:42

## 2025-02-09 RX ADMIN — LABETALOL HYDROCHLORIDE 10 MG: 5 INJECTION, SOLUTION INTRAVENOUS at 02:09

## 2025-02-09 RX ADMIN — LEVETIRACETAM 500 MG: 100 INJECTION INTRAVENOUS at 19:46

## 2025-02-09 RX ADMIN — ACETAMINOPHEN 650 MG: 325 TABLET ORAL at 08:25

## 2025-02-09 RX ADMIN — LISINOPRIL 40 MG: 40 TABLET ORAL at 08:26

## 2025-02-09 RX ADMIN — Medication: at 08:26

## 2025-02-09 RX ADMIN — HEPARIN SODIUM 5000 UNITS: 5000 INJECTION INTRAVENOUS; SUBCUTANEOUS at 13:36

## 2025-02-09 ASSESSMENT — PULMONARY FUNCTION TESTS
PIF_VALUE: 17
PIF_VALUE: 17
PIF_VALUE: 16
PIF_VALUE: 20
PIF_VALUE: 22
PIF_VALUE: 19
PIF_VALUE: 16
PIF_VALUE: 20
PIF_VALUE: 16
PIF_VALUE: 19
PIF_VALUE: 20
PIF_VALUE: 21
PIF_VALUE: 15
PIF_VALUE: 19
PIF_VALUE: 21
PIF_VALUE: 18
PIF_VALUE: 15
PIF_VALUE: 22
PIF_VALUE: 22
PIF_VALUE: 16
PIF_VALUE: 18
PIF_VALUE: 20
PIF_VALUE: 21
PIF_VALUE: 15
PIF_VALUE: 17
PIF_VALUE: 15
PIF_VALUE: 21
PIF_VALUE: 17
PIF_VALUE: 16

## 2025-02-09 ASSESSMENT — PAIN SCALES - GENERAL
PAINLEVEL_OUTOF10: 0

## 2025-02-09 NOTE — CONSULTS
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     Neuro teams believe the patients poor neurological exam is secondary to persistent cerebella edema with some brainstem compression. Failed EVD clamping trial . Re-intubated on  as she was unable to protect her airway. S/p tracheostomy 2/3.      Past Medical History:   Diagnosis Date    Cancer (HCC)     MDS    Clostridium difficile diarrhea 2017    History of blood transfusion     Hyperlipidemia     Pneumonia     Thrombocytopenia (HCC)       Past Surgical History:   Procedure Laterality Date     SECTION, CLASSIC      x 2    COLONOSCOPY  2013    polyp    COLONOSCOPY N/A 3/15/2023    COLONOSCOPY POLYPECTOMY SNARE/COLD BIOPSY performed by Lemuel WATTS MD at [facility] ENDOSCOPY    CRANIOTOMY Right 2025    RIGHT FRONTAL EXTRAVENTRICULAR DRAIN/ SUBOCCIPITAL DECOMPRESSION AND HEMATOMA EVACUATION performed by Scar Kay MD at [facility] OR    LASIK      OTHER SURGICAL HISTORY  2017    INSERTION OF TRIPLE LUMEN AGUILAR CATHETER RIGHT SUBCLAVIAN.    NM EGD BALLOON DILATION ESOPHAGUS <30 MM DIAM N/A 10/16/2018    ESOPHAGOGASTRODUODENOSCOPY WITH BALLOON DILATATION AND KENALOG WITH MAC  Balloon Dilatation to15mm 40mg kenalog injected in equal quadrants of dilation site performed by Mickey Reaves MD at [facility] ENDOSCOPY    TUBAL LIGATION      TUNNELED VENOUS PORT PLACEMENT  2015    PORT A CATH        Admission Meds  No current facility-administered medications on file prior to encounter. 
    [facility] [place] -  Clinical Pharmacy Note    Vancomycin - Management by Pharmacy    Consult Date(s): 01/25/25  Consulting Provider(s): Dr. Tinoco    Assessment / Plan  Pneumonia (Nosocomial) - Vancomycin  Concurrent Antimicrobials:   N/A  Day of Vanc Therapy / Ordered Duration: Day 1 of 7  Current Dosing Method: Bayesian-Guided AUC Dosing  Therapeutic Goal: -600 mg/L*hr  Current Dose / Plan:   Patient RF (sCr 0.5), leukocytosis (WBC 13.1)  Vancomycin 1750mg IV x 1 load dose followed by 1000mg IV Q12H  Estimated KBA21thc 515 mg/L*hr, AUCsteady state 554 mg/L*hr, & Ctrough 15.3 mg/L  Will continue to monitor clinical condition and make adjustments to regimen as appropriate.    Thank you for consulting pharmacy,    Alexander Valdez, [facility] ext [phone]        Interval update:  New start    Subjective/Objective:   Maria Antonia Crawford is a 79 y.o. female with a PMHx significant for Cancer (HCC), Clostridium difficile diarrhea, History of blood transfusion, Hot flashes, Hyperlipidemia, Pneumonia, and Thrombocytopenia (HCC) who is admitted with extremity weakness and facial droop.     Pharmacy is consulted to dose vancomycin.    Ht Readings from Last 1 Encounters:   01/23/25 1.6 m (5' 3\")     Wt Readings from Last 1 Encounters:   01/25/25 68.8 kg (151 lb 10.8 oz)     Current & Prior Antimicrobial Regimen(s):  Vancomycin - current    Vancomycin Level(s) / Doses:    Date Time Dose Type of Level / Level Interpretation                 Note: Serum levels collected for AUC-based dosing may be high if collected in close proximity to the dose administered. This is not necessarily indicative of toxicity.    Cultures & Sensitivities:    Date Site Micro Susceptibility / Result                 Recent Labs     01/23/25  0415 01/24/25  0350 01/24/25  1553 01/25/25  0012 01/25/25  0100 01/25/25  0533   CREATININE 0.8 0.6   < > 0.5* 0.6 0.5*   BUN 13 14   < > 16 16 14   WBC 10.2 15.2*  --   --   --  13.1*    < > = values in this 
Clinical Pharmacy Progress Note    Vancomycin has been discontinued - Pharmacy will sign off on dosing  If resumed, please re-consult Pharmacy     Please call with questions:  [phone] (Main Pharmacy)    Maira Swain Pharm.D. BCPS    1/27/2025 4:05 PM        ADDENDUM:  Pharmacy RE- CONSULTED today by Dr Nation, for 2 additional days of vancomycin to make  total of 5 days.    Disregard sign-off above, refer to Pharmacy Consult note this am by Ghada Vora.  Continue 1250 mg IV q12h x 2 days    Please call with any questions    Maira Swain Pharm.D. BCPS  [phone] (Main Pharmacy)          
Patient remains intubated, now NPO day #4. No pressor or sedation requirements.     Starting TF today, multiple electrolyte abnormalities, being replaced. Will advance TF slowly in setting of possible refeeding.     Na at 153 this morning, per neuro-crit avoid actively correcting d/t cerebellar edema, providing maintenance flushes only until otherwise instructed.    TF orders:  Vital AF 1.2 @ 50 mL/hr to provide: 1200 mL TV, 1440 kcal, 90 g/pro and 973 mL FW    Denia Paiz RD, LD    [place]iles: [phone]  Office:  [phone]    
[facility] -  Clinical Pharmacy Note    Vancomycin - Management by Pharmacy    Consult Date(s): 2/9  Consulting Provider(s): Ingrid Guaman    Assessment / Plan  PNA - Vancomycin  Concurrent Antimicrobials: ceftriaxone  Day of Vanc Therapy / Ordered Duration: 1/7   previously on vancomycin this admission 1/25-28 on 1250 mg IV Q12h  Current Dosing Method: Bayesian-Guided AUC Dosing  Therapeutic Goal: -600 mg/L*hr  Current Dose / Plan:   Renal function stable at baseline Scr = 0.5  Will re-load with 1750 mg IV x1 (~25mg/kg) followed by 1250 mg IV Q12h as previously required this admission  Predicted AUC = 570 mg/L*hr ; Trough = 14.9 mg/L  Will plan to check level 2/10 AM to assess kinetics with clinical changes in past 2 weeks  Will continue to monitor clinical condition and make adjustments to regimen as appropriate.    Thank you for consulting pharmacy,    Please call with any questions,  Lisa Paige, PharmD  PGY1 Pharmacy Resident  Wireless: [phone]  2/9/2025 11:11 AM        Interval update:  therapy initiation     Subjective/Objective:   Maria Antonia Crawford is a 79 y.o. female with a PMHx significant for MDS s/p bone marrow transplant, thrombocytopenia, HLP, hx C.diff, who presented after a fall at home.   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  Patient was extubated on 1/23 post-procedure, but required re-intubation on 1/24 d/t upper airway obstruction.  BAL grew MRSA 1/25.  Repeat respiratory cx from [facility] 2/6 + for staph aureus (MDRO) and proteus (sensitive). Remains ventilated now s/p trach. Negative leukocytosis, with intermittent fevers past 2 days to 101.1F.    Pharmacy is consulted to dose vancomycin.    Ht Readings from Last 1 Encounters:   01/23/25 1.6 m (5' 3\")     Wt Readings from Last 1 Encounters:   02/09/25 68.1 kg (150 lb 2.1 oz)     Current & Prior Antimicrobial 
Vancomycin has been discontinued. Pharmacy will sign off consult. If medication dosing is resumed, please re-consult pharmacy.    Ghada Vora PharmD., BCPS   1/28/2025 9:14 AM  Wireless: [phone]      
    Occupational History    Not on file   Tobacco Use    Smoking status: Former     Current packs/day: 0.00     Types: Cigarettes     Start date: 1/3/1965     Quit date: 1/3/1985     Years since quittin.0    Smokeless tobacco: Never    Tobacco comments:     QUIT    Vaping Use    Vaping status: Never Used   Substance and Sexual Activity    Alcohol use: Yes     Alcohol/week: 2.0 standard drinks of alcohol     Types: 2 Glasses of wine per week    Drug use: No    Sexual activity: Not on file        Family History   Problem Relation Age of Onset    Heart Disease Father     High Blood Pressure Mother         Outpatient Medications Marked as Taking for the 25 encounter (Hospital Encounter)   Medication Sig Dispense Refill    alendronate (FOSAMAX) 70 MG tablet Take 1 tablet by mouth every 7 days          Current Facility-Administered Medications   Medication Dose Route Frequency Provider Last Rate Last Admin    niCARdipine (CARDENE) 25 mg in sodium chloride 0.9 % 250 mL infusion (Dimy2Spn)  2.5-15 mg/hr IntraVENous Continuous Alexander Montejo MD 50 mL/hr at 25 1129 5 mg/hr at 25 1129     Current Outpatient Medications   Medication Sig Dispense Refill    alendronate (FOSAMAX) 70 MG tablet Take 1 tablet by mouth every 7 days      atorvastatin (LIPITOR) 10 MG tablet TAKE ONE TABLET BY MOUTH DAILY      aspirin 81 MG EC tablet Take 81 mg by mouth daily      omeprazole (PRILOSEC) 20 MG delayed release capsule Take 20 mg by mouth 2 times daily (before meals)      CALCIUM-VITAMIN D PO Take 1 tablet by mouth 2 times daily (with meals)      penicillin v potassium (VEETID) 500 MG tablet Take 1 tablet by mouth 2 times daily 60 tablet 3        Objective:  /75   Pulse 69   Resp 19   Wt 70.1 kg (154 lb 9.6 oz)   BMI 27.39 kg/m²     Physical Exam:   Patient seen and examined  GCS:  3 - Opens eyes to loud noise or command  4 - Seems confused, disoriented  6 - Follows simple motor commands  General: Well 
PaO2 >100 mm Hg  Keep PaCO2 Normalized  Hemodynamic management  SBP <= 160 mmHg  Goal range 140-160  IV Intermittent dose: Labetalol 10-20mg  IV continuous infusion: Nicardipine 2.5mg - 20mg, titrate Q5 minutes to desired effect  DVT Prophylaxis  SCDs bilateral Lower Extremities   General Care Issues  Glucose: Goal glucose 110-180 mg/dL.    Sodium:  Maintain in normal range (135 - 146 Beba/L)  Magnesium: Maintain > 1.8 mg/dL.  Heme: Keep Plts >= 100K, Keep INR <= 1.4  Temperature: Goal is normothermia.  Culture for fever > 101.5 F  Nutrition: Place NG if unable to pass SLP swallow evaluation   PT/OT/SLP & PMR consult as indicated    Management and plan discussed with:   Bedside nurse  Dr. Kilpatrick  NSGY team    CHANTAL Kebede - CNP   NEUROCRITICAL CARE  1/22/2025 1:26 PM  [facility]: [facility] NEUROCRITICAL CARE      History of Present Illness     Maria Antonia Crawford is a 79 y.o. y/o female with history significant for MDS, C Diff, HLD, thrombocytopenia.     Patient encephalopathic, history obtained per chart and ER provider. Patient's last known normal 0930 this morning. She developed headache and vomiting, and  found her on the ground unresponsive. EMS was called and she arrived to [facility] where Head CT was completed and showed large posterior fossa ICH with IVH and mass effect on the fourth ventricle. CTA without abnormality. GCS upon arrival was 3/4/6.   She does not take any blood thinners, reports only baby ASA. She received DDAVP given size of hemorrhage. INR 0.93. Platelets 201.     History provided by:  Chart review      REVIEW OF SYSTEMS:   Reports headache, but encephalopathic and ROS limited    Past Medical, Surgical, Family, and Social History   PAST MEDICAL HISTORY:  Past Medical History:   Diagnosis Date    Cancer (HCC)     MDS    Clostridium difficile diarrhea 06/28/2017    History of blood transfusion     Hot flashes     Hyperlipidemia     Pneumonia 2012    Thrombocytopenia (HCC)      SURGICAL 
  Final Result   1. Large posterior fossa intraparenchymal hematoma with intraventricular   extension. Severe mass effect upon the brainstem and upon the fourth ventricle.      Critical results called to Dr. Montejo at 11:19 a.m.      Electronically signed by Uli Matson      CT HEAD WO CONTRAST    (Results Pending)         Conclusion/Time spent:         Recommendations see above    Time spent with patient and/or family: 25  Time reviewing records: 10 min   Time communicating with staff: 5 min     A total of 40 minutes spent with the patient and family on unit greater than 50% in counseling regarding palliative care and in goals of care for the patient.    Thank you to Dr. Lujan for this consultation. We will continue to follow Ms. Crawford's care as needed.    Jayla Mason NP  Palliative Care  [phone]

## 2025-02-10 LAB
ALBUMIN SERPL-MCNC: 2.9 G/DL (ref 3.4–5)
ANION GAP SERPL CALCULATED.3IONS-SCNC: 10 MMOL/L (ref 3–16)
BASOPHILS # BLD: 0.1 K/UL (ref 0–0.2)
BASOPHILS NFR BLD: 1 %
BUN SERPL-MCNC: 19 MG/DL (ref 7–20)
CALCIUM SERPL-MCNC: 9 MG/DL (ref 8.3–10.6)
CHLORIDE SERPL-SCNC: 111 MMOL/L (ref 99–110)
CO2 SERPL-SCNC: 25 MMOL/L (ref 21–32)
CREAT SERPL-MCNC: 0.5 MG/DL (ref 0.6–1.2)
DEPRECATED RDW RBC AUTO: 14.7 % (ref 12.4–15.4)
EOSINOPHIL # BLD: 0.2 K/UL (ref 0–0.6)
EOSINOPHIL NFR BLD: 3.1 %
GFR SERPLBLD CREATININE-BSD FMLA CKD-EPI: >90 ML/MIN/{1.73_M2}
GLUCOSE SERPL-MCNC: 107 MG/DL (ref 70–99)
HCT VFR BLD AUTO: 25.4 % (ref 36–48)
HGB BLD-MCNC: 8 G/DL (ref 12–16)
LYMPHOCYTES # BLD: 1 K/UL (ref 1–5.1)
LYMPHOCYTES NFR BLD: 13.5 %
MAGNESIUM SERPL-MCNC: 2.27 MG/DL (ref 1.8–2.4)
MCH RBC QN AUTO: 34.1 PG (ref 26–34)
MCHC RBC AUTO-ENTMCNC: 31.6 G/DL (ref 31–36)
MCV RBC AUTO: 107.8 FL (ref 80–100)
MONOCYTES # BLD: 0.5 K/UL (ref 0–1.3)
MONOCYTES NFR BLD: 6.8 %
NEUTROPHILS # BLD: 5.8 K/UL (ref 1.7–7.7)
NEUTROPHILS NFR BLD: 75.6 %
PHOSPHATE SERPL-MCNC: 2.9 MG/DL (ref 2.5–4.9)
PLATELET # BLD AUTO: 321 K/UL (ref 135–450)
PMV BLD AUTO: 6.9 FL (ref 5–10.5)
POTASSIUM SERPL-SCNC: 3.9 MMOL/L (ref 3.5–5.1)
RBC # BLD AUTO: 2.35 M/UL (ref 4–5.2)
SODIUM SERPL-SCNC: 146 MMOL/L (ref 136–145)
VANCOMYCIN SERPL-MCNC: 25.6 UG/ML
WBC # BLD AUTO: 7.7 K/UL (ref 4–11)

## 2025-02-10 PROCEDURE — 6360000002 HC RX W HCPCS: Performed by: NURSE PRACTITIONER

## 2025-02-10 PROCEDURE — 6360000002 HC RX W HCPCS

## 2025-02-10 PROCEDURE — 94761 N-INVAS EAR/PLS OXIMETRY MLT: CPT

## 2025-02-10 PROCEDURE — 80069 RENAL FUNCTION PANEL: CPT

## 2025-02-10 PROCEDURE — 2500000003 HC RX 250 WO HCPCS

## 2025-02-10 PROCEDURE — 2700000000 HC OXYGEN THERAPY PER DAY

## 2025-02-10 PROCEDURE — 6370000000 HC RX 637 (ALT 250 FOR IP)

## 2025-02-10 PROCEDURE — 1200000000 HC SEMI PRIVATE

## 2025-02-10 PROCEDURE — 85025 COMPLETE CBC W/AUTO DIFF WBC: CPT

## 2025-02-10 PROCEDURE — 94640 AIRWAY INHALATION TREATMENT: CPT

## 2025-02-10 PROCEDURE — 2580000003 HC RX 258

## 2025-02-10 PROCEDURE — 83735 ASSAY OF MAGNESIUM: CPT

## 2025-02-10 PROCEDURE — 94003 VENT MGMT INPAT SUBQ DAY: CPT

## 2025-02-10 PROCEDURE — 80202 ASSAY OF VANCOMYCIN: CPT

## 2025-02-10 PROCEDURE — 36415 COLL VENOUS BLD VENIPUNCTURE: CPT

## 2025-02-10 PROCEDURE — 99233 SBSQ HOSP IP/OBS HIGH 50: CPT | Performed by: INTERNAL MEDICINE

## 2025-02-10 RX ORDER — HYDRALAZINE HYDROCHLORIDE 20 MG/ML
10 INJECTION INTRAMUSCULAR; INTRAVENOUS EVERY 6 HOURS PRN
Status: DISCONTINUED | OUTPATIENT
Start: 2025-02-10 | End: 2025-02-10

## 2025-02-10 RX ORDER — MORPHINE SULFATE 20 MG/ML
10 SOLUTION ORAL
Status: DISCONTINUED | OUTPATIENT
Start: 2025-02-10 | End: 2025-02-12 | Stop reason: HOSPADM

## 2025-02-10 RX ORDER — ATROPINE SULFATE 10 MG/ML
2 SOLUTION/ DROPS OPHTHALMIC EVERY 4 HOURS PRN
Status: DISCONTINUED | OUTPATIENT
Start: 2025-02-10 | End: 2025-02-12 | Stop reason: HOSPADM

## 2025-02-10 RX ORDER — VANCOMYCIN 1 G/200ML
1000 INJECTION, SOLUTION INTRAVENOUS EVERY 12 HOURS
Status: DISCONTINUED | OUTPATIENT
Start: 2025-02-10 | End: 2025-02-10

## 2025-02-10 RX ORDER — LABETALOL HYDROCHLORIDE 5 MG/ML
10 INJECTION, SOLUTION INTRAVENOUS
Status: DISPENSED | OUTPATIENT
Start: 2025-02-10 | End: 2025-02-11

## 2025-02-10 RX ORDER — LORAZEPAM 2 MG/ML
1 CONCENTRATE ORAL
Status: DISCONTINUED | OUTPATIENT
Start: 2025-02-10 | End: 2025-02-12 | Stop reason: HOSPADM

## 2025-02-10 RX ORDER — HYDRALAZINE HYDROCHLORIDE 20 MG/ML
10 INJECTION INTRAMUSCULAR; INTRAVENOUS
Status: DISPENSED | OUTPATIENT
Start: 2025-02-10 | End: 2025-02-11

## 2025-02-10 RX ADMIN — IPRATROPIUM BROMIDE AND ALBUTEROL SULFATE 1 DOSE: 2.5; .5 SOLUTION RESPIRATORY (INHALATION) at 07:20

## 2025-02-10 RX ADMIN — LEVETIRACETAM 500 MG: 100 INJECTION INTRAVENOUS at 08:28

## 2025-02-10 RX ADMIN — LISINOPRIL 40 MG: 40 TABLET ORAL at 08:28

## 2025-02-10 RX ADMIN — HEPARIN SODIUM 5000 UNITS: 5000 INJECTION INTRAVENOUS; SUBCUTANEOUS at 06:21

## 2025-02-10 RX ADMIN — VANCOMYCIN HYDROCHLORIDE 1250 MG: 1.25 INJECTION, SOLUTION INTRAVITREAL at 00:03

## 2025-02-10 RX ADMIN — SODIUM CHLORIDE 30 MG/ML INHALATION SOLUTION 4 ML: 30 SOLUTION INHALANT at 07:21

## 2025-02-10 RX ADMIN — MORPHINE SULFATE 10 MG: 10 SOLUTION ORAL at 23:09

## 2025-02-10 RX ADMIN — MORPHINE SULFATE 2 MG: 2 INJECTION, SOLUTION INTRAMUSCULAR; INTRAVENOUS at 18:42

## 2025-02-10 RX ADMIN — SODIUM CHLORIDE, PRESERVATIVE FREE 10 ML: 5 INJECTION INTRAVENOUS at 18:43

## 2025-02-10 RX ADMIN — LEVETIRACETAM 500 MG: 100 INJECTION INTRAVENOUS at 22:50

## 2025-02-10 RX ADMIN — SODIUM CHLORIDE, PRESERVATIVE FREE 10 ML: 5 INJECTION INTRAVENOUS at 22:52

## 2025-02-10 ASSESSMENT — PULMONARY FUNCTION TESTS
PIF_VALUE: 18
PIF_VALUE: 20
PIF_VALUE: 28
PIF_VALUE: 24
PIF_VALUE: 22
PIF_VALUE: 29
PIF_VALUE: 23
PIF_VALUE: 29
PIF_VALUE: 23
PIF_VALUE: 23
PIF_VALUE: 20
PIF_VALUE: 20
PIF_VALUE: 21
PIF_VALUE: 19
PIF_VALUE: 19

## 2025-02-10 ASSESSMENT — PAIN SCALES - GENERAL: PAINLEVEL_OUTOF10: 0

## 2025-02-10 ASSESSMENT — PAIN SCALES - WONG BAKER: WONGBAKER_NUMERICALRESPONSE: NO HURT

## 2025-02-10 NOTE — CARE COORDINATION
CM continues to follow- noted PC discussion with family, per note. CM will follow up in the morning with PC NP regarding goals and potential hospice referrals pending family wishes.    Thank you  Cat Fco HERNANDEZ, BSN,   ICU   [phone]

## 2025-02-11 LAB
BACTERIA BLD CULT ORG #2: NORMAL
BACTERIA BLD CULT: NORMAL
BACTERIA CSF CULT: NORMAL
BACTERIA SPEC RESP CULT: ABNORMAL
GRAM STN SPEC: ABNORMAL
GRAM STN SPEC: NORMAL
ORGANISM: ABNORMAL
ORGANISM: ABNORMAL

## 2025-02-11 PROCEDURE — 1200000000 HC SEMI PRIVATE

## 2025-02-11 PROCEDURE — 6370000000 HC RX 637 (ALT 250 FOR IP)

## 2025-02-11 PROCEDURE — 2700000000 HC OXYGEN THERAPY PER DAY

## 2025-02-11 PROCEDURE — 2500000003 HC RX 250 WO HCPCS

## 2025-02-11 PROCEDURE — 6360000002 HC RX W HCPCS: Performed by: NURSE PRACTITIONER

## 2025-02-11 PROCEDURE — 94761 N-INVAS EAR/PLS OXIMETRY MLT: CPT

## 2025-02-11 PROCEDURE — 6360000002 HC RX W HCPCS

## 2025-02-11 RX ADMIN — ATROPINE SULFATE 2 DROP: 10 SOLUTION/ DROPS OPHTHALMIC at 22:33

## 2025-02-11 RX ADMIN — Medication 1 MG: at 17:06

## 2025-02-11 RX ADMIN — MORPHINE SULFATE 4 MG: 4 INJECTION INTRAVENOUS at 17:11

## 2025-02-11 RX ADMIN — MORPHINE SULFATE 2 MG: 2 INJECTION, SOLUTION INTRAMUSCULAR; INTRAVENOUS at 11:57

## 2025-02-11 RX ADMIN — LEVETIRACETAM 500 MG: 100 INJECTION INTRAVENOUS at 08:17

## 2025-02-11 RX ADMIN — Medication 1 MG: at 23:30

## 2025-02-11 RX ADMIN — SODIUM CHLORIDE, PRESERVATIVE FREE 10 ML: 5 INJECTION INTRAVENOUS at 20:16

## 2025-02-11 RX ADMIN — MORPHINE SULFATE 2 MG: 2 INJECTION, SOLUTION INTRAMUSCULAR; INTRAVENOUS at 22:31

## 2025-02-11 RX ADMIN — ATROPINE SULFATE 2 DROP: 10 SOLUTION/ DROPS OPHTHALMIC at 11:57

## 2025-02-11 RX ADMIN — SODIUM CHLORIDE, PRESERVATIVE FREE 10 ML: 5 INJECTION INTRAVENOUS at 08:17

## 2025-02-11 RX ADMIN — LEVETIRACETAM 500 MG: 100 INJECTION INTRAVENOUS at 20:15

## 2025-02-11 RX ADMIN — ATROPINE SULFATE 2 DROP: 10 SOLUTION/ DROPS OPHTHALMIC at 17:17

## 2025-02-11 RX ADMIN — MORPHINE SULFATE 2 MG: 2 INJECTION, SOLUTION INTRAMUSCULAR; INTRAVENOUS at 08:19

## 2025-02-11 ASSESSMENT — PAIN SCALES - GENERAL
PAINLEVEL_OUTOF10: 0

## 2025-02-11 ASSESSMENT — PAIN SCALES - WONG BAKER
WONGBAKER_NUMERICALRESPONSE: NO HURT
WONGBAKER_NUMERICALRESPONSE: HURTS EVEN MORE
WONGBAKER_NUMERICALRESPONSE: NO HURT
WONGBAKER_NUMERICALRESPONSE: NO HURT
WONGBAKER_NUMERICALRESPONSE: HURTS EVEN MORE
WONGBAKER_NUMERICALRESPONSE: NO HURT

## 2025-02-11 NOTE — CARE COORDINATION
CM spoke with PC NP, Jayla, regarding goals of care and potential hospice- per her conversation yesterday,  wanted to hold off and discuss again today. Per PC request, CM reached out to spouse, Andrea, regarding possible hospice- expressed some confusion about hospice and how long we were expecting pt to live at this point. CM addressed these concerns and explained that we cannot know for sure how long pt has, and that hospice is extra support for the pt and family during this time- as well as continued follow up care for family for a year following their loss. Andrea stated he would like to follow up with me later at bedside. CM updated Jayla with PC.     1530:  CM received call from PC NP Jayla- referral to [facility] faxed successfully with plans for GIP vs IPU.    1630:  Hospice RNTeresa, scheduled to meet with family at bedside in the morning at 0800.     Thank you  Cat Fco RN, BSN,   ICU   [phone]

## 2025-02-11 NOTE — CERTIFICATION
Inpatient re-certification    I recertify the continued Inpatient admisson based on the followin. Continued hospitalization is required for ongoing medical treatment or medically required diagnostic study for respiratory failure.     2. The patient is expected to remain in the hospital for :1-2 additional days.    3. The plans for post-hospital care include (choose one):Other pending palliative care and family goals of care

## 2025-02-12 VITALS
SYSTOLIC BLOOD PRESSURE: 125 MMHG | RESPIRATION RATE: 18 BRPM | HEIGHT: 63 IN | DIASTOLIC BLOOD PRESSURE: 69 MMHG | BODY MASS INDEX: 26.91 KG/M2 | OXYGEN SATURATION: 93 % | WEIGHT: 151.9 LBS | HEART RATE: 91 BPM | TEMPERATURE: 98.9 F

## 2025-02-12 PROCEDURE — 94761 N-INVAS EAR/PLS OXIMETRY MLT: CPT

## 2025-02-12 PROCEDURE — 6360000002 HC RX W HCPCS: Performed by: NURSE PRACTITIONER

## 2025-02-12 PROCEDURE — 6370000000 HC RX 637 (ALT 250 FOR IP)

## 2025-02-12 PROCEDURE — 6360000002 HC RX W HCPCS

## 2025-02-12 PROCEDURE — 2500000003 HC RX 250 WO HCPCS

## 2025-02-12 PROCEDURE — 2700000000 HC OXYGEN THERAPY PER DAY

## 2025-02-12 RX ADMIN — MORPHINE SULFATE 4 MG: 4 INJECTION INTRAVENOUS at 05:45

## 2025-02-12 RX ADMIN — MORPHINE SULFATE 2 MG: 2 INJECTION, SOLUTION INTRAMUSCULAR; INTRAVENOUS at 10:48

## 2025-02-12 RX ADMIN — ATROPINE SULFATE 2 DROP: 10 SOLUTION/ DROPS OPHTHALMIC at 05:49

## 2025-02-12 RX ADMIN — LEVETIRACETAM 500 MG: 100 INJECTION INTRAVENOUS at 08:11

## 2025-02-12 RX ADMIN — Medication 1 MG: at 06:12

## 2025-02-12 RX ADMIN — MORPHINE SULFATE 4 MG: 4 INJECTION INTRAVENOUS at 03:47

## 2025-02-12 RX ADMIN — SODIUM CHLORIDE, PRESERVATIVE FREE 10 ML: 5 INJECTION INTRAVENOUS at 08:12

## 2025-02-12 ASSESSMENT — PAIN SCALES - WONG BAKER
WONGBAKER_NUMERICALRESPONSE: HURTS A LITTLE BIT
WONGBAKER_NUMERICALRESPONSE: HURTS EVEN MORE
WONGBAKER_NUMERICALRESPONSE: HURTS EVEN MORE
WONGBAKER_NUMERICALRESPONSE: NO HURT

## 2025-02-12 NOTE — PROGRESS NOTES
NEUROCRITICAL CARE PROGRESS NOTE       Patient Name: Maria Antonia Crawford YOB: 1945   Sex: Female Age: 79 yrs     CC / Reason for Consult: ICH    Changes over last 24 hours:   -POD 1 sub occipital crani and EVD  -BP have been well controlled  -Still on nicardipine  -Plt 211  -ICP 5 this AM  -EVD out 36mL/24 hours    ROS: Unable to provide as she is intubated and sedated    ASSESSMENT & RECOMMENDATIONS   Assessment:  Maria Antonia is 79 year old female with a history myelodysplastic syndrome no currently being treated who presented with abrupt onset of severe headache and vomiting, found to have a 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    Suspect this is a primary ICH in the setting of uncontrolled hypertension.    RECOMMENDATIONS:   NEURO:  Continue Q1 hour neuro checks  NIHSS on admission & Qshift  Cerebral Edema  Avoid hypotonic fluids  HOB >30  Avoid IJ unless cleared by NCC team  ICP Monitor:   EVD open at 5mmhg  Clamp drain Q1H and document ICP and CPP  Notify team if ICP >20 x 20 minutes  Notify team if CPP <60 (MAP-ICP=CPP)    DIAGNOSTIC IMAGING  MRI of brain w/ & w/o when able    ICU MANAGEMENT  Airway Management  Titrate ventilator to maintain PaO2 >100 mm Hg  Keep PaCO2 Normalized  Sedatives and analgesics as indicated for mechanical ventilation  Sedation Vacations Q4H for neurologic exams  Hemodynamic management  SBP <= 160 mmHg   IV Intermittent dose: Labetalol 10mg Q6 hours  IV continuous infusion: Nicardipine 2.5mg - 20mg  Will need oral medications started - defer to priamry team  Seizure prophylaxis  Not indicated   DVT Prophylaxis  SCDs bilateral Lower Extremities   Can start sub Q heparin tonight   General Care Issues  Glucose: Goal glucose 110-180 mg/dL.    Sodium:  Maintain in normal range (135 - 146 Beba/L)  Magnesium: Maintain > 1.8 mg/dL.  Heme: Keep Plts >= 100K, Keep INR <= 1.4  Nutrition: Place NG tube 
       NEUROCRITICAL CARE PROGRESS NOTE       Patient Name: Maria Antonia Crawford YOB: 1945   Sex: Female Age: 79 yrs     CC / Reason for Consult: ICH    Changes over last 24 hours:   -POD 10 sub occipital crani and EVD placement  -Exam much better today, opens eyes to voice and maintains eye opening  Follows commands on the L  -32 mL out of EVD in last 24 hours, significantly dropped off but waveform good and exam better  -ICP average 2-3 overnight  -Provigil started 1/29, increased this AM    ROS: Unable to provide as she is intubated and not communicating    ASSESSMENT & RECOMMENDATIONS   Assessment:  Maria Antonia is 79 year old female with a history myelodysplastic syndrome not currently being treated who presented with abrupt onset of severe headache and vomiting, found to have a 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    Suspect this is a primary ICH in the setting of uncontrolled hypertension.    She has required re-intubation and her exam has been fluctuating although overall, has remained poor and not on sedation. EEG does not indicated seizure and her CSF, in general is not indicative of infection. Suspect her comatose state is likely secondary to persistent mass effect upon her brainstem, which will take time to resolve.     RECOMMENDATIONS:   -Q4 hour neuro checks and GCS  -Continue Provigil daily for neuro stimulation - increased to 200 mg to start tomorrow 2/1  -Will hold off on MRI for now, given her improvement in exam  -Okay to allow sodium to autoregulate but would avoid hyponatremia   -Would not aggressively treat sodium  -Discontinue cvEEG   -No seizure prophylaxis indicated  -EVD to remain open at 5cm  -CSF sent 1/28, unremarkable  -Document ICP and output hourly   -Notify NCC team for ICP >20 for >20 minutes  - Nursing to change dressing if EVD dressing becomes soiled or non occlusive  - Plan per Neurosurgery is to 
       NEUROCRITICAL CARE PROGRESS NOTE       Patient Name: Maria Antonia Crawford YOB: 1945   Sex: Female Age: 79 yrs     CC / Reason for Consult: ICH    Changes over last 24 hours:   -POD 11 sub occipital crani and EVD placement  -8 mL out of EVD in last 24 hours, significantly dropped off but waveform good; ICP has been low  -ICP 1 mmHg on my visit  -Provigil started 1/29, increased 2/1    ROS: Unable to provide as she is intubated and not communicating    ASSESSMENT & RECOMMENDATIONS   Assessment:  Maria Antonia Crawford is 79 year old female with a history myelodysplastic syndrome not currently being treated who presented with abrupt onset of severe headache and vomiting, found to have a 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    Her exam has remained poor. cEEG and CSF reassuring. Suspect her comatose state is likely secondary to persistent mass effect upon her brainstem, which will take time to resolve.     RECOMMENDATIONS:   -Q4 hour neuro checks and GCS  -Continue Provigil daily for neuro stimulation - increased to 200 mg   -Okay to allow sodium to autoregulate but would avoid hyponatremia  -EVD clamping trial today 2/2  - Document ICP hourly  - Notify NCC team for ICP >20 for >20 minutes  - Nursing to change dressing if EVD dressing becomes soiled or non-occlusive  -Airway Management  -Titrate ventilator to maintain PaO2 >100 mm Hg  -Keep PaCO2 Normalized  -Currently not requiring sedation  -Hemodynamic management  -SBP <= 160 mmHg; 173 mmHg on my visit - discussed w/ nursing  -PRN labetolol and hydralazine  -Currently maintaining without further cardene  -appreciate ICU team assistance with BP management  -Heparin 5000 u q8h for DVT ppx   -General Care Issues  -Glucose: Goal glucose 110-180 mg/dL.    -Magnesium: Maintain > 1.8 mg/dL.  -Keep Plts >= 100K, Keep INR <= 1.4  -Nutrition: NG tube in place - okay to start TFs - okay 
       NEUROCRITICAL CARE PROGRESS NOTE       Patient Name: Maria Antonia Crawford YOB: 1945   Sex: Female Age: 79 yrs     CC / Reason for Consult: ICH    Changes over last 24 hours:   -POD 2 sub occipital crani and EVD  -Re-intubated this AM   -Still on nicardipine  -Platelts 230  -leukocytosis this AM, no fevers  -ICP average 7  -EVD out 191 mL/last 24 hours - increased    ROS: Unable to provide as she is intubated and sedated/paralyzed     ASSESSMENT & RECOMMENDATIONS   Assessment:  Maria Antonia is 79 year old female with a history myelodysplastic syndrome not currently being treated who presented with abrupt onset of severe headache and vomiting, found to have a 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    Suspect this is a primary ICH in the setting of uncontrolled hypertension.    She required re-intubation in less than 24 hours due to respiratory distress. Suspect this is likely because her large, bi-hemispheric cerebellar lesion and persistent mass effect on her brainstem causing significant dysphagia in addition to her decreased LOC.    RECOMMENDATIONS:   NEURO:  Continue Q1 hour neuro checks and GCS  Cerebral Edema  Avoid hypotonic fluids  HOB >30  Avoid IJ unless cleared by NCC team  ICP Monitor:   EVD open at 5mmhg  Clamp drain Q1H and document ICP and CPP  Notify team if ICP >20 x 20 minutes  Notify team if CPP <60 (MAP-ICP=CPP)    DIAGNOSTIC IMAGING  MRI of brain w/ & w/o when able  Stat CT this AM    ICU MANAGEMENT  Airway Management  Titrate ventilator to maintain PaO2 >100 mm Hg  Keep PaCO2 Normalized  Sedatives and analgesics as indicated for mechanical ventilation  Sedation Vacations Q4H for neurologic exam  Hemodynamic management  SBP <= 160 mmHg   IV Intermittent dose: Labetalol 10mg Q6 hours  IV continuous infusion: Nicardipine 2.5mg - 20mg  Will need oral medications titrated - defer to priamry team  Seizure 
       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    Suspect this is a primary ICH in the setting of uncontrolled hypertension.    She required re-intubation d/t respiratory distress on 1/24/25. Suspect this is likely because her large, bi-hemispheric cerebellar lesion and persistent mass effect on her brainstem causing significant dysphagia in addition to her decreased LOC.    RECOMMENDATIONS:   NEURO:  Continue Q1 hour neuro checks and GCS  Cerebral Edema  Avoid hypotonic fluids  HOB >30  Avoid IJ unless cleared by NCC team  ICP Monitor:   EVD open at 10mmhg  Clamp drain Q1H and document ICP and CPP  Notify team if ICP >20 x 20 minutes  Notify team if CPP <60 (MAP-ICP=CPP)    DIAGNOSTIC IMAGING  MRI of brain w/ & w/o when able    ICU MANAGEMENT  Airway Management  Titrate ventilator to maintain PaO2 >100 mm Hg  Keep PaCO2 Normalized  Current not requiring sedation  Hemodynamic management  SBP <= 160 mmHg   IV Intermittent dose: Labetalol 10mg Q6 hours  IV continuous infusion: Nicardipine 2.5mg - 20mg  Will [facility] medications [facility]rated [facility]  Seizure prophylaxis  Not indicated   DVT Prophylaxis  SCDs bilateral Lower Extremities   SQH   General Care Issues  Glucose: Goal glucose 110-180 mg/dL.    Sodium:  Maintain in normal range (135 - 146 Beba/L)  Magnesium: 
       NEUROCRITICAL CARE PROGRESS NOTE       Patient Name: Maria Antonia Crawford YOB: 1945   Sex: Female Age: 79 yrs     CC / Reason for Consult: ICH    Changes over last 24 hours:   -POD 8 sub occipital crani and EVD placement  -Exam improved today  -110 mL out of EVD in last 24 hours (increasing)  -ICP average 5-6 overnight  -Still on a low dose of nicardipine    -Provigil started yesterday     ROS: Unable to provide as she is intubated and not communicating    ASSESSMENT & RECOMMENDATIONS   Assessment:  Maria Antonia is 79 year old female with a history myelodysplastic syndrome not currently being treated who presented with abrupt onset of severe headache and vomiting, found to have a 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    Suspect this is a primary ICH in the setting of uncontrolled hypertension.    She has required re-intubation and her exam has been fluctuating although overall, has remained poor and not on sedation. EEG does not indicated seizure and her CSF, in general is not indicative of infection. Suspect her comatose state is likely secondary to persistent mass effect upon her brainstem, which will take time to resolve.     RECOMMENDATIONS:   -Q4 hour neuro checks and GCS  -Continue Provigil 100mg daily for neuro stimulation  -Will hold off on repeat MRI for now as her exam is improving   -Okay to allow sodium to autoregulate but would avoid hyponatremia   -Would not aggressively treat sodium  -Discontinue cvEEG   -No seizure prophylaxis indicated  -EVD to remain open at 5cm  -CSF sent 1/28  -Document ICP and output hourly   -Notify NCC team for ICP >20 for >20 minutes  -Airway Management  -Titrate ventilator to maintain PaO2 >100 mm Hg  -Keep PaCO2 Normalized  -Currently not requiring sedation  -Hemodynamic management  -SBP <= 160 mmHg   -PRN labetolol and hdyralzine  -Nicradipine infsuion   -Seems to need further 
       NEUROCRITICAL CARE PROGRESS NOTE       Patient Name: Maria Antonia Crawford YOB: 1945   Sex: Female Age: 79 yrs     CC / Reason for Consult: ICH    Changes over last 24 hours:   POD 12 sub occipital crani and EVD placement  Head CT this AM w/ interval ventriculomegaly s/p clamping trial   Going for trach today     ROS: Unable to provide as she is intubated and not communicating    ASSESSMENT & RECOMMENDATIONS   Assessment:  Maria Antonia Crawford is 79 year old female with a history myelodysplastic syndrome not currently being treated who presented with abrupt onset of severe headache and vomiting, found to have a 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    Her exam has remained poor. cEEG and CSF reassuring. Suspect her comatose state is likely secondary to persistent mass effect upon her brainstem, which will take time to resolve. Failed clamping trial 2/2.     RECOMMENDATIONS:   -Q4 hour neuro checks and GCS  -Continue Provigil daily for neuro stimulation - increased to 200 mg   -Okay to allow sodium to autoregulate but would avoid hyponatremia  -Unclamp EVD & keep OTD @ 5 mmHG  - Document ICP hourly  - Notify NCC team for ICP > 20 for >2 0 minutes  - Nursing to change dressing if EVD dressing becomes soiled or non-occlusive  -Airway Management  -Titrate ventilator to maintain PaO2 >100 mm Hg  -Keep PaCO2 Normalized  -Currently not requiring sedation  -Hemodynamic management  -SBP <= 160 mmHg  -PRN labetolol and hydralazine  -Currently maintaining without further cardene  -appreciate ICU team assistance with BP management  -Heparin 5000 u q8h for DVT ppx - held for trach today  -General Care Issues  -Glucose: Goal glucose 110-180 mg/dL.    -Magnesium: Maintain > 1.8 mg/dL.  -Keep Plts >= 100K, Keep INR <= 1.4  -Nutrition: NG tube in place - okay to start TFs - okay for free water flushes based on free water deficit    Case 
       NEUROCRITICAL CARE PROGRESS NOTE       Patient Name: Maria Antonia Crawford YOB: 1945   Sex: Female Age: 79 yrs     CC / Reason for Consult: ICH    Changes over last 24 hours:   POD 16 sub occipital crani and EVD placement  10mL out in the last 24 hours from EVD  EVD clamp trial ongoing - ICPs stable  No significant changes in neurologic exam  Febrile overnight     ROS: Unable to provide as she is intubated and not communicating    ASSESSMENT & RECOMMENDATIONS   Assessment:  Maria Antonia Crawford is 79 year old female with a history myelodysplastic syndrome not currently being treated who presented with abrupt onset of severe headache and vomiting, found to have a 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    Her exam has remained poor. Suspect her mental status is likely secondary to persistent mass effect upon her brainstem, which will take time to resolve but difficult to determine timeline. She failed EVD failed clamping trial 2/2, repeat clamping trial 2/5-2/6    Repeat MRI is reassuring against any secondary injury/ischemia to the brainstem/deeper structures and higher cortical structures remain intact.    RECOMMENDATIONS:   -Q4 hour neuro checks and GCS  -Discontinue provigil   -cEEG - No seizures, R temporal spike waves  -Start keppra 500mg BID  -Okay to allow sodium to autoregulate but would avoid hyponatremia  -Clamp trial - repeat head Ct completed & overall appears stable  -Plan for EVD per neurosurgery  - Document ICP hourly  - Notify NCC team for ICP > 25 for >20 minutes  - Nursing to change dressing if EVD dressing becomes soiled or non-occlusive  -Airway Management  -Titrate ventilator to maintain PaO2 >100 mm Hg  -Keep PaCO2 Normalized  -Currently not requiring sedation  -Hemodynamic management  -SBP <= 160 mmHg  -PRN labetolol and hydralazine  -Appreciate ICU team assistance with BP management  -Heparin 5000 u q8h 
       NEUROCRITICAL CARE PROGRESS NOTE       Patient Name: Maria Antonia Crawford YOB: 1945   Sex: Female Age: 79 yrs     CC / Reason for Consult: ICH    Changes over last 24 hours:   POD 17 sub occipital crani and EVD placement  0mL out in the last 24 hours from EVD  EVD clamp trial ongoing - ICPs stable  No significant changes in neurologic exam  Febrile overnight     ROS: Unable to provide as she is intubated and not communicating    ASSESSMENT & RECOMMENDATIONS   Assessment:  Maria Antonia Crawford is 79 year old female with a history myelodysplastic syndrome not currently being treated who presented with abrupt onset of severe headache and vomiting, found to have a 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    Her exam has remained poor. Suspect her mental status is likely secondary to persistent mass effect upon her brainstem, which will take time to resolve but difficult to determine timeline. She failed EVD failed clamping trial 2/2, repeat clamping trial 2/5-2/6    Repeat MRI is reassuring against any secondary injury/ischemia to the brainstem/deeper structures and higher cortical structures remain intact.    RECOMMENDATIONS:   -Q4 hour neuro checks and GCS  -cEEG - No seizures, R temporal spike waves - okay to DC today   -Continue keppra 500mg BID for the R temporal spikes  -Okay to allow sodium to autoregulate but would avoid hyponatremia  -Clamp trial - repeat head Ct completed & overall appears stable - per neurosurgery plan to keep until 2/9/25  - Document ICP hourly  - Notify NCC team for ICP > 25 for >20 minutes  - Nursing to change dressing if EVD dressing becomes soiled or non-occlusive  -Airway Management  -Titrate ventilator to maintain PaO2 >100 mm Hg  -Keep PaCO2 Normalized  -Currently not requiring sedation  -Hemodynamic management  -SBP <= 160 mmHg  -PRN labetolol and hydralazine  -Appreciate ICU team assistance with 
       NEUROCRITICAL CARE PROGRESS NOTE       Patient Name: Maria Antonia Crawford YOB: 1945   Sex: Female Age: 79 yrs     CC / Reason for Consult: ICH    Changes over last 24 hours:   POD 18 sub occipital crani and EVD placement  EVD removed this AM  No significant changes in neurologic exam  Febrile overnight     ROS: Unable to provide as she is intubated and not communicating    ASSESSMENT & RECOMMENDATIONS   Assessment:  Maria Antonia Crawford is 79 year old female with a history myelodysplastic syndrome not currently being treated who presented with abrupt onset of severe headache and vomiting, found to have a 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    Her exam has remained poor. Suspect her mental status is likely secondary to persistent mass effect upon her brainstem, which will take time to resolve but difficult to determine timeline. She failed EVD failed clamping trial 2/2, repeat clamping trial 2/5 - 2/9 - stable ICP's during this time & EVD removed on 2/9    Repeat MRI is reassuring against any secondary injury/ischemia to the brainstem/deeper structures and higher cortical structures remain intact.    RECOMMENDATIONS:   -Q4 hour neuro checks and GCS  -Continue keppra 500mg BID for the R temporal spikes on EEG  -Okay to allow sodium to autoregulate but would avoid hyponatremia  -EVD removed 2/9  -CSF cell count / cx sent 2/8 - no signs of infection on initial results  -Airway Management  -Titrate ventilator to maintain PaO2 >100 mm Hg  -Keep PaCO2 Normalized  -Currently not requiring sedation  -Hemodynamic management  -SBP <= 160 mmHg  -PRN labetolol and hydralazine  -Appreciate ICU team assistance with BP management  -Heparin 5000 u q8h for DVT ppx  -General Care Issues  -Glucose: Goal glucose 110-180.    -Magnesium: Maintain > 1.8  -Keep Plts >= 100K, Keep INR <= 1.4  -Nutrition: NG tube in place - okay for free water flushes 
     ICU Progress Note    Admit Date: 1/22/2025  Hospital Day:  Hospital Day: 10  ICU Day: 10  Vent Settings: Vent Mode: AC/PRVC Resp Rate (Set): 16 bpm/Vt (Set, mL): 400 mL/ /FiO2 : 30 % / PEEP/CPAP (cmH2O): 5 / SpO2: 99 %     CC: headache, N/V      Interval History   - NAEON  - Patient intubated, off sedation. Minimally responsive to painful stimuli  - AF. HR 81-88. -217. BP now 129/58.  - off cardene gtt. 1x hydralazine ovn.   - I/O: 2.2L/1.6L (0 BM in days)= +592cc  - Labs: Na 153>151. WBC 10>11.4.     HPI:   Ms. Maria Antonia Crawford is a 79 y.o. female with a medical hx significant for MDS s/p bone marrow transplant, otherwise as listed in the MHx table below, who presented from home to the ED on 1/22 after a ground level fall at home.     Patient unable to provide entire history of present illness.  Family at bedside providing collateral history.  Family states that the patient is usually AO x 3, functionally independent at home, performs all activities of daily living on her own and works from home.  Her  states that the patient developed a headache this morning around 9 AM with associated nausea and vomiting.  He was in another room and the patient was in the bathroom when he heard a thud.  She went to check on her and found her laying facedown on the ground.  He attempted to pick her up but could not do this alone.  She then attempted to pull herself up using a bathroom door but could not stand up.  The  then called his son and EMS while the son was in a route.  EMS transported the patient to the emergency department for further workup and management.     ED Course:  On arrival to the ED, she was found to afebrile, hypertensive 218/109,   Labs were significant for: Na 134  EKG NSR.  Imaging,  CT Head w/o showing Large posterior fossa intraparenchymal hematoma with intraventricular extension. Severe mass effect upon the brainstem and upon the fourth ventricle.  CTA Head Neck w/ No abnormal 
     ICU Progress Note    Admit Date: 1/22/2025  Hospital Day:  Hospital Day: 12  ICU Day: 12  Vent Settings: Vent Mode: AC/PRVC Resp Rate (Set): 16 bpm/Vt (Set, mL): 400 mL/ /FiO2 : 30 % / PEEP/CPAP (cmH2O): 5 / SpO2: 98 %     CC: headache, N/V    Interval History   - NAEON  - EVD clamp trial today  - Neuro increased Provigil to 200 mg  - Neuro exam: Open eyes to voice but does not attempt to follow commands. Will withdraw to pain in the lower ext but not in the upper.       HPI:   Ms. Maria Antonia Crawford is a 79 y.o. female with a medical hx significant for MDS s/p bone marrow transplant, otherwise as listed in the MHx table below, who presented from home to the ED on 1/22 after a ground level fall at home.     Patient unable to provide entire history of present illness.  Family at bedside providing collateral history.  Family states that the patient is usually AO x 3, functionally independent at home, performs all activities of daily living on her own and works from home.  Her  states that the patient developed a headache this morning around 9 AM with associated nausea and vomiting.  He was in another room and the patient was in the bathroom when he heard a thud.  She went to check on her and found her laying facedown on the ground.  He attempted to pick her up but could not do this alone.  She then attempted to pull herself up using a bathroom door but could not stand up.  The  then called his son and EMS while the son was in a route.  EMS transported the patient to the emergency department for further workup and management.     ED Course:  On arrival to the ED, she was found to afebrile, hypertensive 218/109,   Labs were significant for: Na 134  EKG NSR.  Imaging,  CT Head w/o showing Large posterior fossa intraparenchymal hematoma with intraventricular extension. Severe mass effect upon the brainstem and upon the fourth ventricle.  CTA Head Neck w/ No abnormal vascular lesion in the posterior 
     ICU Progress Note    Admit Date: 1/22/2025  Hospital Day:  Hospital Day: 13  ICU Day: 13  Vent Settings: Vent Mode: AC/PRVC Resp Rate (Set): 16 bpm/Vt (Set, mL): 400 mL/ /FiO2 : 30 % / PEEP/CPAP (cmH2O): 5 / SpO2: 99 %     CC: headache, N/V    Interval History   - POD #12 sub-occipital crani and EVD placement  - Currently holding tube feed for possible consideration of trach today. Anticipating  decision will be made today  - Patient intubated. Not on sedation. Does not follow commands. Will withdraw to pain in upper and lower ext.   - AF. HR 73-82. SBP .  - I/O: 1.5L/815mL (0 BM)= +707mL  - Labs: WBC 12.7>10.7  - Imaging: CT-H- interval ventriculomegaly s/p clamping trial on 2/2    HPI:  Ms. Maria Antonia Crawford is a 79 y.o. female with a medical hx significant for MDS s/p bone marrow transplant, otherwise as listed in the MHx table below, who presented from home to the ED on 1/22 after a ground level fall at home.     Patient unable to provide entire history of present illness.  Family at bedside providing collateral history.  Family states that the patient is usually AO x 3, functionally independent at home, performs all activities of daily living on her own and works from home.  Her  states that the patient developed a headache this morning around 9 AM with associated nausea and vomiting.  He was in another room and the patient was in the bathroom when he heard a thud.  She went to check on her and found her laying facedown on the ground.  He attempted to pick her up but could not do this alone.  She then attempted to pull herself up using a bathroom door but could not stand up.  The  then called his son and EMS while the son was in a route.  EMS transported the patient to the emergency department for further workup and management.     ED Course:  On arrival to the ED, she was found to afebrile, hypertensive 218/109,   Labs were significant for: Na 134  EKG NSR.  Imaging,  CT Head 
     ICU Progress Note    Admit Date: 1/22/2025  Hospital Day:  Hospital Day: 14  ICU Day: 14  Vent Settings: Vent Mode: AC/PRVC Resp Rate (Set): 16 bpm/Vt (Set, mL): 400 mL/ /FiO2 : 35 % / PEEP/CPAP (cmH2O): 5 / SpO2: 100 %     CC: headache, N/V    Interval History   - POD #13 sub-occipital crani and EVD placement  - Trach successfully placed yesterday. Family in agreement for PEG tube. All questions answered  - Patient trach in place. Not on sedation. Does not follow commands. Does not withdraw to pain in upper ext only withdraw to lower ext. Exam wax/wanes  - AF. HR 75-88. -171. Received 1x hydralazine and 1x labetalol yday.  - I/O: 907mL/1.34L (0 BM, 90cc from EVD)= -433cc. Weight 151lbs  - Labs: WBC 10.7>15.2  - Imaging: MRI brain and spine pending    HPI:  Ms. Maria Antonia Crawford is a 79 y.o. female with a medical hx significant for MDS s/p bone marrow transplant, otherwise as listed in the MHx table below, who presented from home to the ED on 1/22 after a ground level fall at home.     Patient unable to provide entire history of present illness.  Family at bedside providing collateral history.  Family states that the patient is usually AO x 3, functionally independent at home, performs all activities of daily living on her own and works from home.  Her  states that the patient developed a headache this morning around 9 AM with associated nausea and vomiting.  He was in another room and the patient was in the bathroom when he heard a thud.  She went to check on her and found her laying facedown on the ground.  He attempted to pick her up but could not do this alone.  She then attempted to pull herself up using a bathroom door but could not stand up.  The  then called his son and EMS while the son was in a route.  EMS transported the patient to the emergency department for further workup and management.     ED Course:  On arrival to the ED, she was found to afebrile, hypertensive 218/109, 
     ICU Progress Note    Admit Date: 1/22/2025  Hospital Day:  Hospital Day: 15  ICU Day: 15  Vent Settings: Vent Mode: CPAP/PS Resp Rate (Set): 16 bpm/Vt (Set, mL): 400 mL/ /FiO2 : 30 % / PEEP/CPAP (cmH2O): 5 / SpO2: 100 %     CC: headache, N/V    Interval History   - POD #14 sub-occipital crani and EVD placement  - Patient pending PEG tube placement today. Holding tube feeds. EVD clamp trial today. Family at bedside, all questions answered.  - Patient trach in place. Not on sedation. Does not follow commands. Does not withdraw to pain in upper ext only withdraw to lower ext. Exam wax/wanes  - AF. HR 81-91. -160. Improved, no PRNs used.  - I/O: 717mL/1.2L (0 BM, 91cc from EVD)= -524cc. Straight cath ovn of >400cc. Weight 154lbs  - Labs: WBC 15.2>13.6  - CSF sample of EVD: bloody, 8500 RBC, WBC 66, 46 neutrophils, 82 glucose.  - Imaging: MRI brain and spine- There is slight progression in mild hydrocephalus. Transependymal edema. Stable subdural, intraventricular, and subarachnoid hemorrhage. Suboccipital scalp has a CSF-like signal which may represent seroma versus pseudomeningocele.     HPI:  Ms. Maria Antonia Crawford is a 79 y.o. female with a medical hx significant for MDS s/p bone marrow transplant, otherwise as listed in the MHx table below, who presented from home to the ED on 1/22 after a ground level fall at home.     Patient unable to provide entire history of present illness.  Family at bedside providing collateral history.  Family states that the patient is usually AO x 3, functionally independent at home, performs all activities of daily living on her own and works from home.  Her  states that the patient developed a headache this morning around 9 AM with associated nausea and vomiting.  He was in another room and the patient was in the bathroom when he heard a thud.  She went to check on her and found her laying facedown on the ground.  He attempted to pick her up but could not do this 
     ICU Progress Note    Admit Date: 1/22/2025  Hospital Day:  Hospital Day: 17  ICU Day: 16  Vent Settings: Vent Mode: CPAP/PS Resp Rate (Set): 0 bpm/Vt (Set, mL): 0 mL/ /FiO2 : 30 % / PEEP/CPAP (cmH2O): 5 / SpO2: 94 %     CC: headache, N/V    Interval History   - POD #16 sub-occipital crani and EVD placement  - Patient failed EVD clamp trial, CT stable  - Palliative following patient and family switched code status from FULL to DNRCCA  - Patient trach in place. Not on sedation. Does not follow commands. Withdraw to pain in upper ext and lower ext. Exam wax/wanes  - Tmax 102.1. HR . -150. 1x labetalol, 1x hydralazine, tylenol PRN used.  - I/O: 1.45L/1.16L (2 BM, 10cc from EVD)= +287cc. Weight 153lbs  - Labs: K 3.3 (repleted), WBC 13.9>14.9  - Imaging: CT-H stable, cEEG moderate non specific encephalopathy and right temporal sharps     HPI:  Ms. Maria Antonia Crawford is a 79 y.o. female with a medical hx significant for MDS s/p bone marrow transplant, otherwise as listed in the MHx table below, who presented from home to the ED on 1/22 after a ground level fall at home.     Patient unable to provide entire history of present illness.  Family at bedside providing collateral history.  Family states that the patient is usually AO x 3, functionally independent at home, performs all activities of daily living on her own and works from home.  Her  states that the patient developed a headache this morning around 9 AM with associated nausea and vomiting.  He was in another room and the patient was in the bathroom when he heard a thud.  She went to check on her and found her laying facedown on the ground.  He attempted to pick her up but could not do this alone.  She then attempted to pull herself up using a bathroom door but could not stand up.  The  then called his son and EMS while the son was in a route.  EMS transported the patient to the emergency department for further workup and management.   
     ICU Progress Note    Admit Date: 1/22/2025  Hospital Day:  Hospital Day: 18  ICU Day: 17  Vent Settings: Vent Mode: AC/PRVC Resp Rate (Set): 16 bpm/Vt (Set, mL): 400 mL/ /FiO2 : 30 % / PEEP/CPAP (cmH2O): 5 / SpO2: 94 %     CC: headache, N/V    Interval History   - POD #17 sub-occipital crani and EVD placement  - Patient failed EVD clamp trial, CT stable  - Palliative following patient and family switched code status from FULL to DNRCCA  - Patient trach in place. Not on sedation. Does not follow commands. Was not withdrawing to pain. Exam wax/wanes  -PEG in place, currently receiving TF  - Tmax 99.4. HR . -180 overnight. 1x hydralazine, tylenol PRN used.  - I/O:      Intake/Output Summary (Last 24 hours) at 2/8/2025 0935  Last data filed at 2/8/2025 0600  Gross per 24 hour   Intake 1350 ml   Output 1125 ml   Net 225 ml       - Labs: Na 146, CBC still pending  - Imaging: cEEG moderate non specific encephalopathy and right temporal sharps     HPI:  Ms. Maria Antonia Crawford is a 79 y.o. female with a medical hx significant for MDS s/p bone marrow transplant, otherwise as listed in the MHx table below, who presented from home to the ED on 1/22 after a ground level fall at home.     Patient unable to provide entire history of present illness.  Family at bedside providing collateral history.  Family states that the patient is usually AO x 3, functionally independent at home, performs all activities of daily living on her own and works from home.  Her  states that the patient developed a headache this morning around 9 AM with associated nausea and vomiting.  He was in another room and the patient was in the bathroom when he heard a thud.  She went to check on her and found her laying facedown on the ground.  He attempted to pick her up but could not do this alone.  She then attempted to pull herself up using a bathroom door but could not stand up.  The  then called his son and EMS while the son 
     ICU Progress Note    Admit Date: 1/22/2025  Hospital Day:  Hospital Day: 20  ICU Day: 19  Vent Settings: Vent Mode: AC/PRVC Resp Rate (Set): 16 bpm/Vt (Set, mL): 400 mL/ /FiO2 : 30 % / PEEP/CPAP (cmH2O): 5 / SpO2: 93 %     CC: headache, N/V    Interval History     Family elected for terminal extubation yesterday evening 2/9 (Please see plan of care note). Neuro exam unchanged this AM off sedation. Code status changed to DNR-CC and patient was taken off ventilator with trach mask in place.    HPI:  Ms. Maria Antonia Crawford is a 79 y.o. female with a medical hx significant for MDS s/p bone marrow transplant, otherwise as listed in the MHx table below, who presented from home to the ED on 1/22 after a ground level fall at home.     Patient unable to provide entire history of present illness.  Family at bedside providing collateral history.  Family states that the patient is usually AO x 3, functionally independent at home, performs all activities of daily living on her own and works from home.  Her  states that the patient developed a headache this morning around 9 AM with associated nausea and vomiting.  He was in another room and the patient was in the bathroom when he heard a thud.  She went to check on her and found her laying facedown on the ground.  He attempted to pick her up but could not do this alone.  She then attempted to pull herself up using a bathroom door but could not stand up.  The  then called his son and EMS while the son was in a route.  EMS transported the patient to the emergency department for further workup and management.     ED Course:  On arrival to the ED, she was found to afebrile, hypertensive 218/109,   Labs were significant for: Na 134  EKG NSR.  Imaging,  CT Head w/o showing Large posterior fossa intraparenchymal hematoma with intraventricular extension. Severe mass effect upon the brainstem and upon the fourth ventricle.  CTA Head Neck w/ No abnormal vascular lesion in 
    CONTINUOUS EEG    Name:  Maria Antonia Crawford  Medical Record Number:  [id number]  Age: 79 y.o.   Gender: female  : 1945  Today's Date:  2025  Room:  [facility]01  Vital Signs   BP (!) 146/68   Pulse 72   Temp 100.3 °F (37.9 °C) (Axillary)   Resp 20   Ht 1.6 m (5' 3\")   Wt 66.2 kg (145 lb 15.1 oz)   SpO2 98%   BMI 25.85 kg/m²       Patient currently on continuous EEG monitoring.  All EEG leads are currently in place with no current issues.  Verified [facility] connection via team viewer.  Checked in with patient RN for current plan of care.    Comments: Continue monitoring. All leads within 10K limit.    Electronically signed by Geeta Juarez on 2025 at 10:31 PM    
    CONTINUOUS EEG    Name:  Maria Antonia Crawford  Medical Record Number:  [id number]  Age: 79 y.o.   Gender: female  : 1945  Today's Date:  2025  Room:  [address] [place] [address]-  Vital Signs   BP (!) 166/74   Pulse 74   Temp 99.7 °F (37.6 °C) (Axillary)   Resp 23   Ht 1.6 m (5' 3\")   Wt 66.2 kg (145 lb 15.1 oz)   SpO2 97%   BMI 25.85 kg/m²           Continuous EEG Testing Start Time:  14:11.      Comments: Yanet at [facility] contacted 14:11 for monitoring. Dr Burrows at [facility] contacted 14:30 for reading. Impedence on all leads are within normal limits. Today is the initial set up day for cvEEG. Patient head is NOT wrapped. Madelyn TRIVEDI  is aware that this patients cvEEG will be repositioned on 2025 at 14:11. Madelyn TRIVEDI was notified at 14:11 by this EEG technician. Patient's head was placed on blanket roll upon completion of cvEEG placement.      Plan of Care: Begin monitoring.    Electronically signed by Sonja Alexandra on 2025 at 2:33 PM    
    CONTINUOUS EEG    Name:  Maria Antonia Crawford  Medical Record Number:  [id number]  Age: 79 y.o.   Gender: female  : 1945  Today's Date:  2025  Room:  [facility]-01  Vital Signs   BP (!) 98/52   Pulse 95   Temp (!) 102.1 °F (38.9 °C) (Axillary)   Resp 26   Ht 1.6 m (5' 3\")   Wt 67.5 kg (148 lb 13 oz)   SpO2 98%   BMI 26.36 kg/m²       Patient currently on continuous EEG monitoring.  All EEG leads are currently in place with no current issues.  Verified [facility] connection via team viewer.  Checked in with patient RN for current plan of care.    Comments: Continue monitoring. All leads within 10K limit.     Electronically signed by Geeta Juarez on 2025 at 9:38 PM    
    CONTINUOUS EEG    Name:  Maria Antonia Crawford  Medical Record Number:  [id number]  Age: 79 y.o.   Gender: female  : 1945  Today's Date:  2025  Room:  [address] [place] [address]-  Vital Signs   /64   Pulse 85   Temp 98.7 °F (37.1 °C) (Axillary)   Resp 21   Ht 1.6 m (5' 3\")   Wt 65.7 kg (144 lb 13.5 oz)   SpO2 99%   BMI 25.66 kg/m²           Continuous EEG Testing End Time:   12:22 PM      Comments:  Per Kunal Pablo NP patient test is completed. [facility] contacted 12:22 via team viewer. Scalp assessment performed by this EEG techLiborio R.N.       Plan of care:Removed all leads from pt's scalp and cleansed.      Electronically signed by Sonja Alexandra on 2025 at 12:33 PM    
    CONTINUOUS EEG    Name:  Maria Antonia Crawford  Medical Record Number:  [id number]  Age: 79 y.o.   Gender: female  : 1945  Today's Date:  2025  Room:  [address] [place] [address]-  Vital Signs   BP (!) 153/64   Pulse 88   Temp 98.4 °F (36.9 °C) (Axillary)   Resp 24   Ht 1.6 m (5' 3\")   Wt 69.5 kg (153 lb 3.5 oz)   SpO2 100%   BMI 27.14 kg/m²       Patient currently on continuous EEG monitoring.  All EEG leads are currently in place with no current issues.  Verified [facility] connection via team viewer.  Checked in with patient RN for current plan of care.    Comments: Continue monitoring. All leads within 10K limit. Today is day 1 of cvEEG.    Electronically signed by Geeta Juarez on 2025 at 12:30 PM    
    NEUROSURGERY PROGRESS NOTE    1/24/2025 9:44 AM                               Maria Antonia Crawford                      LOS: 2 days   POD# 2 s/p Procedure(s) (LRB):  RIGHT FRONTAL EXTRAVENTRICULAR DRAIN/ SUBOCCIPITAL DECOMPRESSION AND HEMATOMA EVACUATION (Right)    Subjective:  Respiratory failure requiring intubation early this morning. Patient intubated and sedated/paralyzed     Physical Exam:  Patient seen and examined    Vitals:    01/24/25 0855   BP: (!) 131/51   Pulse: 91   Resp:    Temp:    SpO2:      General: Intubated, sedated, calm  Neurologic  Mental status/exam:   On 10mcg of propofol, recently intubated with RSI  Opens eyes to repeated trap pressure but does not attend to examiner  Non specific eye movements with passive eyes opening  Pupils are 3mm bilaterally, brisk and round  No movements of any extremities to pain or spontaneous    Incision: Old drainage on dressing    Drain: 191 mL in past 24 hours      Radiological Findings:  CT HEAD WO CONTRAST  Result Date: 1/22/2025  Large posterior fossa intraparenchymal hematoma with intraventricular extension. Severe mass effect upon the brainstem and upon the fourth ventricle.     CTA HEAD NECK W CONTRAST  Result Date: 1/22/2025  1. No abnormal vascular lesion in the posterior fossa.  2. No flow significant stenosis in the head or neck.  3. Nonflow-limiting atherosclerotic disease in the bilateral carotid bulbs.     CT HEAD WO CONTRAST  Result Date: 1/22/2025  1. Posterior fossa craniotomy with partial evacuation of hematoma with associated pneumocephalus, slight interval decrease of mass effect.  2. There is a right frontal ventriculostomy catheter projecting into the left lateral ventricle with interval development of intraventricular hemorrhage in the left lateral ventricle, as well as extra-axial acute fluid collections over the right frontal region, subarachnoid hemorrhage, and extra-axial bleeding along the falx without midline shift. Interval 
    NEUROSURGERY PROGRESS NOTE    1/24/2025 9:54 AM                               Maria Antonia Crawford                      LOS: 2 days   POD# 2 s/p Procedure(s) (LRB):  RIGHT FRONTAL EXTRAVENTRICULAR DRAIN/ SUBOCCIPITAL DECOMPRESSION AND HEMATOMA EVACUATION (Right)    Subjective:  Respiratory failure requiring intubation early this morning. Patient intubated and sedated/paralyzed     Physical Exam:  Patient seen and examined    Vitals:    01/24/25 0855   BP: (!) 131/51   Pulse: 91   Resp:    Temp:    SpO2:      GCS:  3 - Opens eyes to loud noise or command  4 - Seems confused, disoriented  6 - Follows simple motor commands  General: Well developed. Alert and cooperative in no acute distress.     HENT: atraumatic, neck supple  Eyes: Optic discs: Not tested  Pulmonary: unlabored respiratory effort  Cardiovascular:  Warm well perfused. No peripheral edema  Gastrointestinal: abdomen soft, NT, ND     Neurological:  Mental Status: Lethargic, but eyes open to voice. Able to answer a few questions and tell me her name, but confused on situation  Follows commands in all four extremities  Attention: Intact  Language: Mild dysarthria noted  Sensation: Intact to all extremities to light touch  Coordination: TAO 2/2 confusion     Cranial Nerves:  II: Blinks to visual threat   III, IV, VI: Pupils equal and reactive to light, extraocular muscles intact as she tracks me around room   V: Facial sensation intact bilaterally to touch  VII: Face symmetric  VIII: Hearing intact bilaterally to spoken voice  IX: Palate movement equal bilaterally  XI: Shoulder shrug equal bilaterally  XII: Tongue midline     Musculoskeletal:   Gait: Not tested   Assist devices: None   Tone: Normal  Motor strength: Exam limited 2/2 lethargy  BUEs: Holds antigravity without drift,  to command  BLEs: Able to lift antigravity bilaterally, drops both to bed     Incision: Old drainage on dressing     Drain: 81 mL in past 24 hours    Radiological Findings:  CT 
    NEUROSURGERY PROGRESS NOTE    1/25/2025 6:36 AM                               Maria Antonia Crawford                      LOS: 3 days   POD# 3 s/p Procedure(s) (LRB):  RIGHT FRONTAL EXTRAVENTRICULAR DRAIN/ SUBOCCIPITAL DECOMPRESSION AND HEMATOMA EVACUATION (Right)    Subjective:  Patient remains intubated. S/p bronch yesterday and started on Vancomycin. Tmax over last 24 hours was 101.8     Physical Exam:  Patient seen and examined    Vitals:    01/25/25 0600   BP: (!) 160/67   Pulse: 88   Resp: 20   Temp:    SpO2:      General: Intubated, calm  Neurologic  Mental status/exam:   Non specific eye movements with passive eyes opening  Pupils are 3mm bilaterally, brisk and round  Withdraws in all four extremities to painful stimuli    Incision: ANTONIO, old bloody drainage, no active drainage    Drain: 74 mL in past 24 hours    Radiological Findings:  CT HEAD WO CONTRAST  Result Date: 1/22/2025  Large posterior fossa intraparenchymal hematoma with intraventricular extension. Severe mass effect upon the brainstem and upon the fourth ventricle.     CTA HEAD NECK W CONTRAST  Result Date: 1/22/2025  1. No abnormal vascular lesion in the posterior fossa.  2. No flow significant stenosis in the head or neck.  3. Nonflow-limiting atherosclerotic disease in the bilateral carotid bulbs.     CT HEAD WO CONTRAST  Result Date: 1/22/2025  1. Posterior fossa craniotomy with partial evacuation of hematoma with associated pneumocephalus, slight interval decrease of mass effect.  2. There is a right frontal ventriculostomy catheter projecting into the left lateral ventricle with interval development of intraventricular hemorrhage in the left lateral ventricle, as well as extra-axial acute fluid collections over the right frontal region, subarachnoid hemorrhage, and extra-axial bleeding along the falx without midline shift. Interval decrease of hydrocephalus.      Labs:  Recent Labs     01/25/25  0533   WBC 13.1*   HGB 11.1*   HCT 32.6*   PLT 
    NEUROSURGERY PROGRESS NOTE    1/26/2025 8:21 AM                               Maria Antonia Crawford                      LOS: 4 days   POD# 4 s/p Procedure(s) (LRB):  RIGHT FRONTAL EXTRAVENTRICULAR DRAIN/ SUBOCCIPITAL DECOMPRESSION AND HEMATOMA EVACUATION (Right)    Subjective:  Patient remains intubated, off sedation. T-max over last 24 hours was 100.6.     Physical Exam:  Patient seen and examined    Vitals:    01/26/25 0800   BP:    Pulse:    Resp:    Temp: 99.9 °F (37.7 °C)   SpO2:      General: Intubated, calm  Neurologic  Mental status/exam:   Opened left eye with loud name calling and tactile stimulation. Required manual eye opening of the right eye. Non-specific eye movements.  Pupils are 3mm bilaterally, brisk and round.  Briefly flickers toes (L>R) when asked to wiggle, otherwise did not follow commands.   Right upper extremity extends to pain, otherwise withdraws in other extremities to painful stimuli.    Incision: ANTONIO, old bloody drainage, no active drainage    Drain: 13 mL (13/0) output past 24 hours    Radiological Findings:  CT HEAD WO CONTRAST  Result Date: 1/22/2025  Large posterior fossa intraparenchymal hematoma with intraventricular extension. Severe mass effect upon the brainstem and upon the fourth ventricle.     CTA HEAD NECK W CONTRAST  Result Date: 1/22/2025  1. No abnormal vascular lesion in the posterior fossa.  2. No flow significant stenosis in the head or neck.  3. Nonflow-limiting atherosclerotic disease in the bilateral carotid bulbs.     CT HEAD WO CONTRAST  Result Date: 1/22/2025  1. Posterior fossa craniotomy with partial evacuation of hematoma with associated pneumocephalus, slight interval decrease of mass effect.  2. There is a right frontal ventriculostomy catheter projecting into the left lateral ventricle with interval development of intraventricular hemorrhage in the left lateral ventricle, as well as extra-axial acute fluid collections over the right frontal region, 
    NEUROSURGERY PROGRESS NOTE    1/27/2025 12:45 PM                               Maria Antonia Crafword                      LOS: 5 days   POD# 5 s/p Procedure(s) (LRB):  RIGHT FRONTAL EXTRAVENTRICULAR DRAIN/ SUBOCCIPITAL DECOMPRESSION AND HEMATOMA EVACUATION (Right)    Subjective:  No acute events overnight. Patient remains intubated, off sedation. T-max over last 24 hours was 100. EVD continues to have difficulty draining even with multiple flushes.     Physical Exam:  Patient seen and examined    Vitals:    01/27/25 1230   BP: (!) 164/72   Pulse: 80   Resp: 21   Temp:    SpO2: 99%     General: Intubated, calm  Neurologic  Mental status/exam:   Opened left eye with loud name calling and tactile stimulation. Required manual eye opening of the right eye. Non-specific eye movements.  Pupils are 3mm bilaterally, brisk and round.  Briefly flickers toes (L>R) when asked to wiggle, otherwise did not follow commands.   Right upper extremity extends to pain, otherwise withdraws in other extremities to painful stimuli.    Incision: Dressing intact with old bloody drainage, no active drainage, no swelling    Drain: 0 mL output past 24 hours    Radiological Findings:  CT HEAD WO CONTRAST  Result Date: 1/22/2025  Large posterior fossa intraparenchymal hematoma with intraventricular extension. Severe mass effect upon the brainstem and upon the fourth ventricle.     CTA HEAD NECK W CONTRAST  Result Date: 1/22/2025  1. No abnormal vascular lesion in the posterior fossa.  2. No flow significant stenosis in the head or neck.  3. Nonflow-limiting atherosclerotic disease in the bilateral carotid bulbs.     CT HEAD WO CONTRAST  Result Date: 1/22/2025  1. Posterior fossa craniotomy with partial evacuation of hematoma with associated pneumocephalus, slight interval decrease of mass effect.  2. There is a right frontal ventriculostomy catheter projecting into the left lateral ventricle with interval development of intraventricular 
    NEUROSURGERY PROGRESS NOTE    1/29/2025 10:49 AM                               Maria Antonia Crawford                      LOS: 7 days   POD# 7 s/p Procedure(s) (LRB):  RIGHT FRONTAL EXTRAVENTRICULAR DRAIN/ SUBOCCIPITAL DECOMPRESSION AND HEMATOMA EVACUATION (Right)    Subjective:  No acute events overnight. Patient continues to have poor neuro exam, but EVD remains patent and CSF flowing properly    Physical Exam:  Patient seen and examined    Vitals:    01/29/25 1030   BP: 126/64   Pulse: 80   Resp: 20   Temp:    SpO2: 99%     GCS:  1 - Does not open eyes  T - ETT in place  3 - Flexor response (decorticate)    General: Encephalopathic and Intubated, but not sedated.   HENT: ETT and EVD in place  Eyes: Optic discs: Not tested  Pulmonary: Ventilator assist  Cardiovascular:  Warm well perfused. No peripheral edema  Gastrointestinal: abdomen soft, NT, ND    Neurological:  Mental Status: Does not open eyes, follow commands or attempt to speak around ETT  Attention: TAO 2/2 encephalopathy  Language: TAO 2/2 ETT in place  Sensation: Bite ETT with central and peripheral noxious tactile stimuli  Coordination: TAO 2/2 encephalopathy    Cranial Nerves:  II: +corneal reflex  III, IV, VI: PERRL, 4 mm bilaterally, EOM exam TAO 2/2 encephalopathy  V: Facial sensation intact bilaterally to touch  VII: Face symmetric  VIII: Hearing exam TAO 2/2 encephalopathy  IX: Palate movement TAO 2/2 ETT in place  XI: Shoulder exam TAO 2/2 encephalopathy  XII: Tongue exam TAO 2/2 ETT in place    Musculoskeletal:   Gait: Not tested   Assist devices: None   Tone: Normal  Motor strength: No movement in BUE and triple flexion in BLE to noxious tactile stimuli.     Incision: Dressing intact with old bloody drainage, no active drainage, no swelling    Drain: 33 mL output past 24 hours    Radiological Findings:  CT HEAD WO CONTRAST  Result Date: 1/22/2025  Large posterior fossa intraparenchymal hematoma with intraventricular extension. Severe mass 
    NEUROSURGERY PROGRESS NOTE    1/30/2025 9:44 AM                               Maria Antonia Crawford                      LOS: 8 days   POD# 8 s/p Procedure(s) (LRB):  RIGHT FRONTAL EXTRAVENTRICULAR DRAIN/ SUBOCCIPITAL DECOMPRESSION AND HEMATOMA EVACUATION (Right)    Subjective:  No acute events overnight. Patient is more awake and alert this morning.    Physical Exam:  Patient seen and examined    Vitals:    01/30/25 0900   BP: (!) 144/76   Pulse: 86   Resp: 19   Temp:    SpO2: 99%     GCS:  4 - Opens eyes on own  T - ETT in place  6 - Follows simple motor commands    General: Critical but stable. Intubated, but not sedated.   HENT: ETT and EVD in place  Eyes: Optic discs: Not tested  Pulmonary: Ventilator assist  Cardiovascular:  Warm well perfused. Edema in extremities (1-2+)  Gastrointestinal: abdomen soft, NT, ND    Neurological:  Mental Status: Awake and more alert. Following commands, but still has ETT in place so unable to determine language exam  Attention: Follows commands  Language: TAO 2/2 ETT in place  Sensation: Grimaces with central and peripheral noxious tactile stimuli  Coordination: TAO 2/2 generalized weakness and unable to follow complex commands    Cranial Nerves:  II: Blinks to threat in both eyes  III, IV, VI: PERRL, 4 mm bilaterally, EOM exam limited 2/2 unable to follow complex commands but does appear to track people around room  V: Facial sensation intact bilaterally to touch  VII: Face symmetric  VIII: Hearing exam intact bilaterally  IX: Palate movement TAO 2/2 ETT in place  XI: Shoulder exam equal bilaterally  XII: Tongue exam TAO 2/2 ETT in place    Musculoskeletal:   Gait: Not tested   Assist devices: None   Tone: Normal  Motor strength: gives \"thumb up\" on left hand and wiggles toes on both feet weakly    Incision: Dressing intact with old bloody drainage, no active drainage, no swelling    Drain: 110 mL output past 24 hours    Radiological Findings:  CT HEAD WO CONTRAST  Result Date: 
    NEUROSURGERY PROGRESS NOTE    1/31/2025 10:13 AM                               Maria Antonia Crawford                      LOS: 9 days   POD# 9 s/p Procedure(s) (LRB):  RIGHT FRONTAL EXTRAVENTRICULAR DRAIN/ SUBOCCIPITAL DECOMPRESSION AND HEMATOMA      Subjective: no acute events overnight, exam fluctuating.     ICP 0-8  EVD with good waveform     Physical Exam:  Patient seen and examined    Vitals:    01/31/25 0900   BP: (!) 137/59   Pulse: 84   Resp: 20   Temp:    SpO2: 100%     GCS:  2 - Opens eyes with pain  T  3 - Flexor response (decorticate)     General: Critical but stable. Intubated, but not sedated.   HENT: ETT and EVD in place  Eyes: Optic discs: Not tested  Pulmonary: Ventilator assist  Cardiovascular:  Warm well perfused. Edema in extremities (1-2+)  Gastrointestinal: abdomen soft, NT, ND    Neurological:  Mental Status: opens eyes minimally to pain. Not following commands   Language: TAO 2/2 ETT in place  Sensation: response to pain in BLE and LUE    Cranial Nerves:  II: Blinks to threat in both eyes  III, IV, VI: PERRL, 4 mm bilaterally  V: Facial sensation intact bilaterally to touch  VII: Face appears symmetric  VIII: Hearing exam intact bilaterally  IX: Palate movement TAO 2/2 ETT in place  XI: Shoulder exam equal bilaterally    Musculoskeletal:   Gait: Not tested   Assist devices: None   Tone: Normal  Motor strength: flexion to pain in BLE and LUE. No response to pain in RUE     Incision: ANTONIO c/d/i    Drain: 105mL output past 24 hours. Good waveform.     Radiological Findings:  CT HEAD WO CONTRAST  Result Date: 1/27/2025  1. Persistent intraventricular hemorrhage and right frontal ventriculostomy catheter with hemorrhage along the tract and subdural regions, slight increase in intraventricular blood pooling or layering in the occipital horns.  2. Persistent extensive suboccipital craniotomy changes and hemorrhage throughout the midline cerebellum and left cerebral hemisphere with low density 
    NEUROSURGERY PROGRESS NOTE    2/1/2025 7:58 AM                               Maria Antonia Crawford                      LOS: 10 days   POD# 9 s/p Procedure(s) (LRB):  RIGHT FRONTAL EXTRAVENTRICULAR DRAIN/ SUBOCCIPITAL DECOMPRESSION AND HEMATOMA      Subjective: no acute events overnight, no marked changes in exam    ICP 0-8  EVD with good waveform     Physical Exam:  Patient seen and examined    Vitals:    02/01/25 0700   BP: (!) 141/70   Pulse: 77   Resp: 20   Temp:    SpO2: 100%     GCS:  3 - Opens eyes with voice at times. Briefly regards examiner  T  3 - Flexor response (decorticate)     General: Critical but stable. Intubated, but not sedated.   HENT: ETT and EVD in place  Eyes: Optic discs: Not tested  Pulmonary: Ventilator assist  Cardiovascular:  Warm well perfused. Edema in extremities (1-2+)  Gastrointestinal: abdomen soft, NT, ND    Neurological:  Mental Status: opens eyes minimally to pain. Not following commands   Language: TAO 2/2 ETT in place  Sensation: response to pain in BLE and LUE    Cranial Nerves:  II: Blinks to threat in both eyes  III, IV, VI: PERRL, 4 mm bilaterally  VII: Face appears symmetric  IX: Palate movement TAO 2/2 ETT in place      Motor strength: flexion to pain in BLE and LUE. No response to pain in RUE     Incision: ANTONIO c/d/I - nylon suture in place    Drain: 32 mL output past 24 hours. Good waveform.     Radiological Findings:  No new imaging    Labs:  Recent Labs     02/01/25  0550   WBC 11.8*   HGB 10.3*   HCT 30.9*          Recent Labs     02/01/25  0550   *   K 3.1*   *   CO2 27   BUN 24*   CREATININE 0.5*   GLUCOSE 134*   CALCIUM 9.1   PHOS 2.4*   MG 2.21       No results for input(s): \"PROTIME\", \"INR\", \"APTT\" in the last 72 hours.      Patient Active Problem List    Diagnosis Date Noted    Lower respiratory infection 01/24/2025    Respiratory failure requiring intubation 01/23/2025    Intracranial hemorrhage (HCC) 01/22/2025    Obstructive hydrocephalus 
    NEUROSURGERY PROGRESS NOTE    2/4/2025 1:05 PM                               Maria Antonia Crawford                      LOS: 13 days   POD# 12 s/p Procedure(s) (LRB):  RIGHT FRONTAL EXTRAVENTRICULAR DRAIN/ SUBOCCIPITAL DECOMPRESSION AND HEMATOMA      Subjective: No acute events overnight. Patient neuro exam unchanged    ICP 4-7  EVD with good waveform     Physical Exam:  Patient seen and examined    Vitals:    02/04/25 1108   BP:    Pulse: 89   Resp: 26   Temp:    SpO2: 100%     GCS:  3 - Opens eyes with voice at times.  T - ETT in place  4 - Moves part of body but does not remove noxious stimulus    General: Critical but stable. Trach in place, but not sedated.   HENT: Trach and EVD in place  Eyes: Optic discs: Not tested  Pulmonary: Ventilator assist (CPAP Mode)  Cardiovascular:  Warm well perfused.   Gastrointestinal: abdomen soft, NT, ND    Neurological:  Mental Status: opens eyes minimally to pain. Not following commands   Language: TAO 2/2 encephalopathy  Sensation: response to pain in BLE and LUE    Cranial Nerves:  II: Blinks to threat in both eyes  III, IV, VI: PERRL, 4 mm bilaterally  VII: Face appears symmetric  IX: Palate movement TAO 2/2 encephalopathy      Motor strength: flexion to pain in BLE. No response to pain in BUE     Incision: ANTONIO c/d/I - nylon suture in place    Drain: 90 mL output past 24 hours as drain was clamped.     Radiological Findings:  CT HEAD WO CONTRAST  Result Date: 2/3/2025  Right frontal approach ventriculostomy catheter as before with interval ventriculomegaly, hydronephrosis compared with the prior examination. Suboccipital craniotomy with surgical changes, blood products in the surgical bed, intraventricular hemorrhage, and other findings as above.       Labs:  Recent Labs     02/04/25  0505   WBC 15.2*   HGB 10.2*   HCT 30.5*          Recent Labs     02/04/25  0505      K 3.9      CO2 24   BUN 19   CREATININE 0.5*   GLUCOSE 132*   CALCIUM 8.6   PHOS 2.7 
    NEUROSURGERY PROGRESS NOTE    2/5/2025 10:28 AM                               Maria Antonia Crawford                      LOS: 14 days   POD# 13 s/p Procedure(s) (LRB):  RIGHT FRONTAL EXTRAVENTRICULAR DRAIN/ SUBOCCIPITAL DECOMPRESSION AND HEMATOMA      Subjective: No acute events overnight. Patient neuro exam unchanged. Dr. Kay talked with patient's spouse and son at length about prognosis and expectations.    ICP 3-4  EVD with good waveform     Physical Exam:  Patient seen and examined    Vitals:    02/05/25 1000   BP: (!) 145/69   Pulse: 88   Resp: 20   Temp:    SpO2: 100%     GCS:  3 - Opens eyes with voice at times.  T - ETT in place  4 - Moves part of body but does not remove noxious stimulus    General: Critical but stable. Trach in place, but not sedated.   HENT: Trach and EVD in place  Eyes: Optic discs: Not tested  Pulmonary: Ventilator assist (CPAP Mode)  Cardiovascular:  Warm well perfused.   Gastrointestinal: abdomen soft, NT, ND    Neurological:  Mental Status: opens eyes minimally to pain. Following commands intermittently  Language: TAO 2/2 encephalopathy  Sensation: response to pain in BLE and LUE    Cranial Nerves:  II: Blinks to threat in both eyes  III, IV, VI: PERRL, 4 mm bilaterally  VII: Face appears symmetric  IX: Palate movement TAO 2/2 encephalopathy      Motor strength: flexion to pain in BLE. No response to pain in BUE     Incision: ANTONIO c/d/I - nylon suture in place    Drain: 101 mL output past 24 hours     Radiological Findings:  CT HEAD WO CONTRAST  Result Date: 2/3/2025  Right frontal approach ventriculostomy catheter as before with interval ventriculomegaly, hydronephrosis compared with the prior examination. Suboccipital craniotomy with surgical changes, blood products in the surgical bed, intraventricular hemorrhage, and other findings as above.       Labs:  Recent Labs     02/05/25 0528   WBC 13.6*   HGB 9.5*   HCT 28.2*          Recent Labs     02/05/25 0528    
    NEUROSURGERY PROGRESS NOTE    2/7/2025 1:44 PM                               Maria Antonia Crawford                      LOS: 16 days   POD# 15 s/p Procedure(s) (LRB):  RIGHT FRONTAL EXTRAVENTRICULAR DRAIN/ SUBOCCIPITAL DECOMPRESSION AND HEMATOMA      Subjective: No acute events overnight. Patient neuro exam stable, but poor.    ICP 2 at time of exam  EVD with good waveform     Physical Exam:  Patient seen and examined    Vitals:    02/07/25 1300   BP: (!) 152/63   Pulse: 90   Resp: 23   Temp:    SpO2: 100%     GCS:  1 - Does not open eyes  T - Trach in place  3 - Flexor response (decorticate)    General: Critical but stable. Trach in place, but not sedated.   HENT: Trach and EVD in place  Eyes: Optic discs: Not tested  Pulmonary: Ventilator assist (CPAP Mode)  Cardiovascular:  Warm well perfused.   Gastrointestinal: abdomen soft, NT, ND    Neurological:  Mental Status: Does NOT opens eyes to pain, does NOT follow commands  Language: TAO 2/2 encephalopathy  Sensation: Grimace to pain in BLE    Cranial Nerves:  II: Does NOT blinks to threat in both eyes  III, IV, VI: PERRL, 4 mm bilaterally  VII: Face appears symmetric  IX: Palate movement TAO 2/2 encephalopathy      Motor strength: Triple flexion reflex to pain in BLE (L>R). No response to pain in BUE     Incision: ANTONIO c/d/I - nylon suture in place    Drain: 0 mL output past 24 hours    Radiological Findings:  CT HEAD WO CONTRAST  Result Date: 2/3/2025  Right frontal approach ventriculostomy catheter as before with interval ventriculomegaly, hydronephrosis compared with the prior examination. Suboccipital craniotomy with surgical changes, blood products in the surgical bed, intraventricular hemorrhage, and other findings as above.     MRI BRAIN WO CONTRAST & MRI CERVICAL WO  Result Date: 2/4/2025  HEAD:  No significant change in extensive, now late subacute, intraparenchymal hemorrhage in the cerebellum causing effacement of the fourth ventricle. There is slight 
    NEUROSURGERY PROGRESS NOTE    2/8/2025 7:19 AM                               Maria Antonia Crawford                      LOS: 17 days   POD# 16 s/p Procedure(s) (LRB):  RIGHT FRONTAL EXTRAVENTRICULAR DRAIN/ SUBOCCIPITAL DECOMPRESSION AND HEMATOMA      Subjective: No acute events overnight. Patient neuro exam worsened this morning. Patient febrile this morning with axillary temp of 100.2. Patient     ICP 13-15 at time of exam  EVD with good waveform     Physical Exam:  Patient seen and examined    Vitals:    02/08/25 0700   BP: 136/60   Pulse: (!) 101   Resp: 25   Temp:    SpO2: 94%     GCS:  1 - Does not open eyes  1 - Makes no noise  2 - Extensor response (decerebrate) in BUE and Triple Flexion in BLE    General: Febrile, Trach in place, but not sedated.   HENT: Trach and EVD in place  Eyes: Optic discs: Not tested  Pulmonary: Ventilator assist (A/C PRVC Mode)  Cardiovascular:  Warm well perfused.   Gastrointestinal: abdomen soft, NT, ND    Neurological:  Mental Status: Does NOT opens eyes to pain, does NOT follow commands  Language: TAO 2/2 encephalopathy  Sensation: Grimace to pain in BLE    Cranial Nerves:  II: Does NOT blinks to threat in both eyes  III, IV, VI: PERRL, 4 mm bilaterally  VII: Face appears symmetric  IX: Palate movement TAO 2/2 encephalopathy      Motor strength: Extension in BUE and Triple flexion reflex to pain in BLE    Incision: ANTONIO c/d/I - nylon suture in place    Drain: 0 mL output past 24 hours    Radiological Findings:  CT HEAD WO CONTRAST  Result Date: 2/3/2025  Right frontal approach ventriculostomy catheter as before with interval ventriculomegaly, hydronephrosis compared with the prior examination. Suboccipital craniotomy with surgical changes, blood products in the surgical bed, intraventricular hemorrhage, and other findings as above.     MRI BRAIN WO CONTRAST & MRI CERVICAL WO  Result Date: 2/4/2025  HEAD:  No significant change in extensive, now late subacute, intraparenchymal 
    NEUROSURGERY PROGRESS NOTE    2/9/2025 9:03 AM                               Maria Antonia Crawford                      LOS: 18 days   POD# 17 s/p Procedure(s) (LRB):  RIGHT FRONTAL EXTRAVENTRICULAR DRAIN/ SUBOCCIPITAL DECOMPRESSION AND HEMATOMA      Subjective: No acute events overnight. Patient neuro exam still depressed    ICP 9-10 at time of exam  EVD with good waveform     Physical Exam:  Patient seen and examined    Vitals:    02/09/25 0845   BP:    Pulse: 87   Resp: 20   Temp:    SpO2: 97%     GCS:  1 - Does not open eyes  1 - Makes no noise  2 - Extensor response (decerebrate) in BUE and Triple Flexion in BLE    General: Febrile, Trach in place, but not sedated.   HENT: Trach and EVD in place  Eyes: Optic discs: Not tested  Pulmonary: Ventilator assist (A/C PRVC Mode)  Cardiovascular:  Warm well perfused.   Gastrointestinal: abdomen soft, NT, ND    Neurological:  Mental Status: Does NOT opens eyes to pain, does NOT follow commands  Language: TAO 2/2 encephalopathy  Sensation: Grimace to pain in BLE    Cranial Nerves:  II: Does NOT blinks to threat in both eyes  III, IV, VI: PERRL, 4 mm bilaterally  VII: Face appears symmetric  IX: Palate movement TAO 2/2 encephalopathy      Motor strength: Extension in BUE and Triple flexion reflex to pain in BLE    Incision: ANTONIO with serous drainage from caudal end of incision. One suture placed and no additional drainage seen. Gauze placed over incision in case drainage reoccurs.    Drain: 0 mL output past 24 hours    Radiological Findings:  CT HEAD WO CONTRAST  Result Date: 2/3/2025  Right frontal approach ventriculostomy catheter as before with interval ventriculomegaly, hydronephrosis compared with the prior examination. Suboccipital craniotomy with surgical changes, blood products in the surgical bed, intraventricular hemorrhage, and other findings as above.     MRI BRAIN WO CONTRAST & MRI CERVICAL WO  Result Date: 2/4/2025  HEAD:  No significant change in extensive, 
    V2.0    [facility] [place]      Name:  Maria Antonia Crawford /Age/Sex: 1945  (79 y.o. female)   MRN & CSN:  [id number] & [id number] Encounter Date/Time: 2025 2:20 PM EST   Location:  [address] [place] [address] PCP: Andrea Uriostegui MD     Attending:Jazmín Montes MD       Hospital Day: 8    Assessment and Recommendations     80yo female presenting with AMS, N/V, found to have large posterior fossa hematoma in the setting of hypertensive emergency. Taken to OR emergently for right frontal extraventricular drain/suboccipital decompression and hematoma evacuation. Has Hx of MDS s/p bone marrow transplant, HTN. Currently intubated in the ICU. Failed extubation and had to be re intubated. Was started on abx for MRSA in BAL. MRI brain showing persistent posterior fossa mass effect effacing the 4th ventricle and progression of hydrocephalus as well as scattered subarachnoid and intraventricular hemorrhages.      Large posterior fossa intraparenchymal hemorrhage  Midline shift  Severe mass effect upon brainstem and 4th ventricle  Scattered intraventricular hemorrhages  Scattered subarachnoid hemorrhages  EVD in place.  EVD exchanged on 2025.  -Cardene drip PRN.   -sbp<160mmHg.   -Continue to monitor ICP  -Appreciate Neurology, NCC, Neurosurgery.   -Telemetry as ordered.     Respiratory failure  Impaired airway protection due to above  Remains intubated  ICU team discussing tracheostomy with family    Hypertensive emergency  -Cardene as needed. BB as ordered.     Electrolyte derangements  Hypokalemia  -Replete.     Leukocytosis  -Monitor for signs of infection.     Hx of MDS  -Currently on no treatment.    Diet Diet NPO  ADULT TUBE FEEDING VOLUME BASED; Nasogastric; Peptide Based; 200; Continuous; 1,200; 24   DVT Prophylaxis [] Lovenox, []  Heparin, [x] SCDs, [] Ambulation,  [] Eliquis, [] Xarelto  [] Coumadin   Code Status Full Code   Disposition From: Home  Expected Disposition: TBD  Estimated Date of Discharge: 
    V2.0    [facility] [place]      Name:  Maria Antonia Crawford /Age/Sex: 1945  (79 y.o. female)   MRN & CSN:  [id number] & [id number] Encounter Date/Time: 2025 2:20 PM EST   Location:  [address] [place] [address] PCP: Andrea Uriostegui MD     Attending:Jazmín Montes MD       Hospital Day: 9    Assessment and Recommendations     78yo female presenting with AMS, N/V, found to have large posterior fossa hematoma in the setting of hypertensive emergency. Taken to OR emergently for right frontal extraventricular drain/suboccipital decompression and hematoma evacuation. Has Hx of MDS s/p bone marrow transplant, HTN. Currently intubated in the ICU. Failed extubation and had to be re intubated. Was started on abx for MRSA in BAL. MRI brain showing persistent posterior fossa mass effect effacing the 4th ventricle and progression of hydrocephalus as well as scattered subarachnoid and intraventricular hemorrhages.      Large posterior fossa intraparenchymal hemorrhage  Midline shift  Severe mass effect upon brainstem and 4th ventricle  Scattered intraventricular hemorrhages  Scattered subarachnoid hemorrhages  EVD in place.  EVD exchanged on 2025.  -Cardene drip PRN.   -sbp<160mmHg.   -Continue to monitor ICP  -Appreciate Neurology, NCC, Neurosurgery.   -Telemetry as ordered.     Respiratory failure  Impaired airway protection due to above  Remains intubated  ICU team discussing tracheostomy with family    Hypertensive emergency  -Cardene as needed. BB as ordered.     Electrolyte derangements  Hypokalemia  -Replete.     Leukocytosis  -Monitor for signs of infection.     Hx of MDS  -Currently on no treatment.    Diet Diet NPO  ADULT TUBE FEEDING VOLUME BASED; Nasogastric; Peptide Based; 225; Continuous; 1,200; 24   DVT Prophylaxis [] Lovenox, []  Heparin, [x] SCDs, [] Ambulation,  [] Eliquis, [] Xarelto  [] Coumadin   Code Status Full Code   Disposition From: Home  Expected Disposition: TBD  Estimated Date of Discharge: 
    V2.0    [facility] [place]      Name:  Maria Antonia Crawford /Age/Sex: 1945  (79 y.o. female)   MRN & CSN:  [id number] & [id number] Encounter Date/Time: 2025 3:34 PM EST   Location:  [facility]/4525-01 PCP: Andrea Uriostegui MD     Attending:Gagandeep Sanchez MD       Hospital Day: 14    Assessment and Recommendations   Maria Antonia Crawford is a 79 y.o. female with pmh of MDS s/p bone marrow transplant  who presents with Intracranial hemorrhage (HCC)      Large posterior fossa intraparenchymal hemorrhage with scattered intraventricular and subarachnoid hemorrhages  --2025 right frontal EVD/suboccipital decompression and hematoma evacuation by neurosurgery  --MRI on  showed mildly increased size of the lateral and third ventricles with persistent posterior fossa mass effect   --2025 replacement of right EVD by neurosurgery, EVD clamp trial tomorrow  Neurocritical care and neurosurgery consulted and following,   Repeat MRI brain and C-spine today  Neurochecks q4h  Off AEDs  Provigil 200 mg daily  Management of EVD per neurosurgery     Acute hypoxic respiratory failure  MSSA pneumonia  Pulm/Crit consulted  Failed extubation on 2025 BAL positive for MSSA   Completed IV vancomycin course for MSSA pneumonia  Tracheostomy placed on 2/3/2025  GI consulted for PEG eval     HTN  Sinus Pause  Cardiology consulted  Coreg discontinued  Continue other antihypertensives  Patient's  would like to monitor for now and avoid PPM     Constipation  Laxation therapy     Hx of MDS s/p BM transplant  Currently on no treatment  Monitor CBC      Diet Diet NPO  ADULT TUBE FEEDING VOLUME BASED; Nasogastric; Peptide Based; 30; Continuous; 1,200; 24   DVT Prophylaxis [] Lovenox, []  Heparin, [] SCDs, [] Ambulation,  [] Eliquis, [] Xarelto  [] Coumadin   Code Status Full Code   Disposition From: Home  Expected Disposition: LTAC  Estimated Date of Discharge: TBD  Patient requires continued admission due to neuro 
    V2.0    [facility] [place]      Name:  Maria Antonia Crawford /Age/Sex: 1945  (79 y.o. female)   MRN & CSN:  [id number] & [id number] Encounter Date/Time: 2025 2:20 PM EST   Location:  [address] [place] [address] PCP: Andrea Uriostegui MD     Attending:Bang Gonzalez*       Hospital Day: 6    Assessment and Recommendations     78yo female presenting with AMS, N/V, found to have large posterior fossa hematoma in the setting of hypertensive emergency. Taken to OR emergently for right frontal extraventricular drain/suboccipital decompression and hematoma evacuation. Has Hx of MDS s/p bone marrow transplant, HTN. Currently intubated in the ICU. Failed extubation and had to be re intubated. Was started on abx for MRSA in BAL. MRI brain showing persistent posterior fossa mass effect effacing the 4th ventricle and progression of hydrocephalus as well as scattered subarachnoid and intraventricular hemorrhages.      Large posterior fossa intraparenchymal hemorrhage  Midline shift  Severe mass effect upon brainstem and 4th ventricle  Scattered intraventricular hemorrhages  Scattered subarachnoid hemorrhages  -Cardene drip PRN.   -sbp<160mmHg.   -Q1 hour NC  -Appreciate Neurology, NCC, Neurosurgery.   -Telemetry as ordered.     Hypertensive emergency  -Cardene as needed. BB as ordered.     Electrolyte derangements  Hypokalemia  -Replete.     Leukocytosis  -Monitor for signs of infection.     Hx of MDS  -Currently on no treatment.    Diet Diet NPO   DVT Prophylaxis [] Lovenox, []  Heparin, [x] SCDs, [] Ambulation,  [] Eliquis, [] Xarelto  [] Coumadin   Code Status Full Code   Disposition From: Home  Expected Disposition: TBD  Estimated Date of Discharge: TBD  Patient requires continued admission due to Brain Hemorrhage   Surrogate Decision Maker/ POA  On file.      Personally reviewed Lab Studies and Imaging     Discussed management of the case with case management. I reviewed Neurology's notes.         Subjective: 
    V2.0    [facility] [place]      Name:  Maria Antonia Crawford /Age/Sex: 1945  (79 y.o. female)   MRN & CSN:  [id number] & [id number] Encounter Date/Time: 2025 2:20 PM EST   Location:  45/4525-01 PCP: Andrea Uriostegui MD     Attending:Kurtis Wiggins MD       Hospital Day: 12    Assessment and Recommendations     Per chart review, \"79 y.o. female with a medical hx significant for MDS s/p bone marrow transplant, who presented from home to the ED on  after a ground level fall at home.     Patient unable to provide history, per family patient easily AO x 3, functionally independent at home.  Patient developed a headache this morning around 9 AM with associated nausea and vomiting.  He was in another room and the patient was in the bathroom when he heard a thud.  He went to check on her and found her laying facedown on the ground.  He attempted to pick her up but could not do this alone.  She then attempted to pull herself up using a bathroom door but could not stand up.  The  then called his son and EMS. EMS transported the patient to the emergency department for further workup and management.     ED Course:  On arrival to the ED, she was found to afebrile, hypertensive 218/109,   Labs were significant for: Na 134  EKG NSR.  Imaging,  CT Head w/o showing Large posterior fossa intraparenchymal hematoma with intraventricular extension. Severe mass effect upon the brainstem and upon the fourth ventricle.  CTA Head Neck w/ no LVO  While in the ED, she received labetolol, cardene gtt, desmopressin and Neurosurgery was consulted for further management.\"    Assessment and plan     Large posterior fossa intraparenchymal hemorrhage  Midline shift  Severe mass effect upon brainstem and 4th ventricle  Scattered intraventricular hemorrhages  Scattered subarachnoid hemorrhages  EVD in place.  EVD exchanged on 2025.  Head CT revealed large posterior fossa ICH with IVH causing obstruction of the fourth 
    V2.0    [facility] [place]      Name:  Maria Antonia Crawford /Age/Sex: 1945  (79 y.o. female)   MRN & CSN:  [id number] & [id number] Encounter Date/Time: 2025 2:20 PM EST   Location:  [address] [place] [address] PCP: Andrea Uriostegui MD     Attending:Bang Gonzalez*       Hospital Day: 3    Assessment and Recommendations     80yo female presenting with AMS, N/V, found to have large posterior fossa hematoma in the setting of hypertensive emergency. Taken to OR emergently for right frontal extraventricular drain/suboccipital decompression and hematoma evacuation. Has Hx of MDS s/p bone marrow transplant, HTN. Currently intubated in the ICU. Due for brain MRI. Had to be reintubated with repeat CT scan stable.      Large posterior fossa intraparenchymal hemorrhage  Midline shift  Severe mass effect upon brainstem and 4th ventricle  -Cardene drip PRN.   -sbp<160mmHg.   -Q1 hour NC  -Appreciate Neurology, NCC, Neurosurgery.   -Telemetry as ordered.   -Brain MRI when able.      Hypertensive emergency  -Cardene as needed. BB as ordered.    Diet Diet NPO   DVT Prophylaxis [] Lovenox, []  Heparin, [x] SCDs, [] Ambulation,  [] Eliquis, [] Xarelto  [] Coumadin   Code Status Full Code   Disposition From: Home  Expected Disposition: TBD  Estimated Date of Discharge: TBD  Patient requires continued admission due to Brain hematoma   Surrogate Decision Maker/ POA  On file.      Personally reviewed Lab Studies and Imaging     Discussed management of the case with case management. I reviewed Neurology's notes.         Subjective:     Patient seen and evaluated at bedside. Will not follow commands.       Review of Systems:      Pertinent positives and negatives discussed in HPI    Objective:     Intake/Output Summary (Last 24 hours) at 2025 1355  Last data filed at 2025 1300  Gross per 24 hour   Intake 1876.06 ml   Output 2390 ml   Net -513.94 ml      Vitals:   Vitals:    25 1215 25 1230 25 1245 25 
    V2.0    [facility] [place]      Name:  Maria Antonia Crawford /Age/Sex: 1945  (79 y.o. female)   MRN & CSN:  [id number] & [id number] Encounter Date/Time: 2025 2:20 PM EST   Location:  4525/4525-01 PCP: Andrea Uriostegui MD     Attending:Kurtis Wiggins MD       Hospital Day: 11    Assessment and Recommendations     Per chart review, \"79 y.o. female with a medical hx significant for MDS s/p bone marrow transplant, who presented from home to the ED on  after a ground level fall at home.     Patient unable to provide history, per family patient easily AO x 3, functionally independent at home.  Patient developed a headache this morning around 9 AM with associated nausea and vomiting.  He was in another room and the patient was in the bathroom when he heard a thud.  He went to check on her and found her laying facedown on the ground.  He attempted to pick her up but could not do this alone.  She then attempted to pull herself up using a bathroom door but could not stand up.  The  then called his son and EMS. EMS transported the patient to the emergency department for further workup and management.     ED Course:  On arrival to the ED, she was found to afebrile, hypertensive 218/109,   Labs were significant for: Na 134  EKG NSR.  Imaging,  CT Head w/o showing Large posterior fossa intraparenchymal hematoma with intraventricular extension. Severe mass effect upon the brainstem and upon the fourth ventricle.  CTA Head Neck w/ no LVO  While in the ED, she received labetolol, cardene gtt, desmopressin and Neurosurgery was consulted for further management.\"    Assessment and plan     Large posterior fossa intraparenchymal hemorrhage  Midline shift  Severe mass effect upon brainstem and 4th ventricle  Scattered intraventricular hemorrhages  Scattered subarachnoid hemorrhages  EVD in place.  EVD exchanged on 2025.  Head CT revealed large posterior fossa ICH with IVH causing obstruction of the fourth 
    V2.0    [facility] [place]      Name:  Maria Antonia Crawford /Age/Sex: 1945  (79 y.o. female)   MRN & CSN:  [id number] & [id number] Encounter Date/Time: 2025 2:20 PM EST   Location:  [address] [place] [address] PCP: Andrea Uriostegui MD     Attending:Bang Gonzalez*       Hospital Day: 5    Assessment and Recommendations     80yo female presenting with AMS, N/V, found to have large posterior fossa hematoma in the setting of hypertensive emergency. Taken to OR emergently for right frontal extraventricular drain/suboccipital decompression and hematoma evacuation. Has Hx of MDS s/p bone marrow transplant, HTN. Currently intubated in the ICU. Due for brain MRI. Had to be reintubated with repeat CT scan stable. Very slow improvement.      Large posterior fossa intraparenchymal hemorrhage  Midline shift  Severe mass effect upon brainstem and 4th ventricle  -Cardene drip PRN.   -sbp<160mmHg.   -Q1 hour NC  -Appreciate Neurology, NCC, Neurosurgery.   -Telemetry as ordered.   -Brain MRI when possible. Still pending       Hypertensive emergency  -Cardene as needed. BB as ordered, dose increased today.     Diet Diet NPO   DVT Prophylaxis [] Lovenox, []  Heparin, [x] SCDs, [] Ambulation,  [] Eliquis, [] Xarelto  [] Coumadin   Code Status Full Code   Disposition From: Home  Expected Disposition: TBD  Estimated Date of Discharge: TBD  Patient requires continued admission due to Brain hematoma   Surrogate Decision Maker/ POA  On file.      Personally reviewed Lab Studies and Imaging     Discussed management of the case with case management. I reviewed Neurology's notes.         Subjective:     Patient seen and evaluated at bedside. Wiggles her toes for me      Review of Systems:      Pertinent positives and negatives discussed in HPI    Objective:     Intake/Output Summary (Last 24 hours) at 2025 1336  Last data filed at 2025 1309  Gross per 24 hour   Intake 1759.44 ml   Output 2398 ml   Net -638.56 ml      Vitals: 
    V2.0    [facility] [place]      Name:  Maria Antonia Crawford /Age/Sex: 1945  (79 y.o. female)   MRN & CSN:  [id number] & [id number] Encounter Date/Time: 2025 2:20 PM EST   Location:  [address] [place] [address] PCP: Andrea Uriostegui MD     Attending:Bang Gonzalez*       Hospital Day: 2    Assessment and Recommendations     80yo female presenting with AMS, N/V, found to have large posterior fossa hematoma in the setting of hypertensive emergency. Taken to OR emergently for right frontal extraventricular drain/suboccipital decompression and hematoma evacuation. Has Hx of MDS s/p bone marrow transplant, HTN. Currently intubated in the ICU. Due for brain MRI.      Large posterior fossa intraparenchymal hemorrhage  Midline shift  Severe mass effect upon brainstem and 4th ventricle  -Emergent NS intervention. Will follow up results.   -Cardene drip PRN.   -sbp<160mmHg.   -Q1 hour NC  -Appreciate Neurology, NCC, Neurosurgery.   -Telemetry as ordered.      Hypertensive emergency  -Cardene as needed. BB as ordered.    Diet Diet NPO   DVT Prophylaxis [] Lovenox, []  Heparin, [x] SCDs, [] Ambulation,  [] Eliquis, [] Xarelto  [] Coumadin   Code Status Full Code   Disposition From: Home  Expected Disposition: TBD  Estimated Date of Discharge: TBD  Patient requires continued admission due to Brain hematoma   Surrogate Decision Maker/ POA  On file.      Personally reviewed Lab Studies and Imaging     Discussed management of the case with case management. I reviewed Neurology's notes.         Subjective:     Patient seen and evaluated at bedside. Will follow commands.       Review of Systems:      Pertinent positives and negatives discussed in HPI    Objective:     Intake/Output Summary (Last 24 hours) at 2025 1420  Last data filed at 2025 1400  Gross per 24 hour   Intake 4772.69 ml   Output 2612 ml   Net 2160.69 ml      Vitals:   Vitals:    25 0800 25 0900 25 0905 25 1400   BP: 112/60 114/60 
    V2.0    [facility] [place]      Name:  Maria Antonia Crawford /Age/Sex: 1945  (79 y.o. female)   MRN & CSN:  [id number] & [id number] Encounter Date/Time: 2025 3:34 PM EST   Location:  Endo Pool/NONE PCP: Andrea Uriostegui MD     Attending:Gagandeep Sanchez MD       Hospital Day: 15    Assessment and Recommendations   Maria Antonia Crawford is a 79 y.o. female with pmh of MDS s/p bone marrow transplant  who presents with Intracranial hemorrhage (HCC)      Large posterior fossa intraparenchymal hemorrhage with scattered intraventricular and subarachnoid hemorrhages  --2025 right frontal EVD/suboccipital decompression and hematoma evacuation by neurosurgery  --MRI on  showed mildly increased size of the lateral and third ventricles with persistent posterior fossa mass effect   --2025 replacement of right EVD by neurosurgery, EVD clamp trials per NSGY  Neurocritical care and neurosurgery consulted and following,   Repeated MRI brain and C-spine: hematoma remains but no signs of CVA in parenchyma  Neurochecks q4h  Off AEDs  Provigil 200 mg daily  Management of EVD per neurosurgery     Acute hypoxic respiratory failure  MSSA pneumonia  Pulm/Crit consulted  Failed extubation on 2025 BAL positive for MSSA   Completed IV vancomycin course for MSSA pneumonia  Tracheostomy placed on 2/3/2025  GI consulted: PEG today     HTN  Sinus Pause  Cardiology consulted  Coreg discontinued  Continue other antihypertensives  Patient's  would like to monitor for now and avoid PPM     Constipation  Laxation therapy     Hx of MDS s/p BM transplant  Currently on no treatment  Monitor CBC      Diet Diet NPO  ADULT TUBE FEEDING VOLUME BASED; Nasogastric; Peptide Based; 30; Continuous; 1,200; 24   DVT Prophylaxis [] Lovenox, []  Heparin, [] SCDs, [] Ambulation,  [] Eliquis, [] Xarelto  [] Coumadin   Code Status Full Code   Disposition From: Home  Expected Disposition: LTAC  Estimated Date of Discharge: 
  Bedside ENDO for PEG placement complete. Pt tolerated well. ICP and BP elevated but below goal. Per Dr. Lujan pt on CPAP mode during day and A/C overnight as tolerating.         
  Comprehensive Nutrition Assessment    RECOMMENDATIONS:  Continue current TF  Vital AF 1.2 @ 50 mL/hr (1200 mL TV)  Flushes: 225 mL q4h  Bowel regimen started; last BM 1/26  Nutrition Education: Education/Counseling not appropriate     NUTRITION ASSESSMENT:   Nutritional summary & status: Pt remains intubated, no presssor or sedation requirements. Remains unresponsive, prognosis is guarded. EVD in place w/ 105 mL output in the past 24 hours. Continues on TF at goal. 2.5L free water deficit, Na trending down slowly, plan to continue current free water flushes. Last BM on 1/26, bowel regimen started this morning.   Admission // PMH: Intracranial hemorrhage//obstructive hydrocephalus, respiratory failure, MDS, MUD - Allo BMT    MALNUTRITION ASSESSMENT  Context of Malnutrition: Acute Illness   Malnutrition Status: At risk for malnutrition  Findings of the 6 clinical characteristics of malnutrition (Minimum of 2 out of 6 clinical characteristics is required to make the diagnosis of moderate or severe Protein Calorie Malnutrition based on AND/ASPEN Guidelines):  Energy Intake:  Mild decrease in energy intake  Weight Loss:  No weight loss     Body Fat Loss:  No body fat loss     Muscle Mass Loss:  No muscle mass loss    Fluid Accumulation:  No fluid accumulation      NUTRITION DIAGNOSIS   Inadequate oral intake related to impaired respiratory function as evidenced by intubation    Nutrition Monitoring and Evaluation:   Food/Nutrient Intake Outcomes:  Enteral Nutrition Intake/Tolerance  Physical Signs/Symptoms Outcomes:  Biochemical Data, Nutrition Focused Physical Findings, Weight, Constipation, Hemodynamic Status     OBJECTIVE DATA: Significant to nutrition assessment  Nutrition Related Findings: Na 151. . +BM 1/26. +1L.  Wounds: Surgical Incision  Nutrition Goals: Tolerate nutrition support at goal rate, within 2 days     CURRENT NUTRITION THERAPIES  Current Tube Feeding (TF) Orders:  Feeding Route: 
  Comprehensive Nutrition Assessment    RECOMMENDATIONS:  Continue current TF s/p PEG placement  Vital AF 1.2 @ 50 mL/hr (1200 mL TV)  Decrease flushes: 30 mL q4h  Nutrition Education: Education/Counseling not appropriate     NUTRITION ASSESSMENT:   Nutritional summary & status: Pt now with trach, possible PEG placement planned for today. TF held since yesterday for possible PEG placement today. Free water flushes decreased for down trending Na. Continues to have regular bowel movements with regimen in place. Plan for LTACH after EVD removal.   Admission // PMH: Intracranial hemorrhage//obstructive hydrocephalus, respiratory failure, MDS, MUD - Allo BMT    MALNUTRITION ASSESSMENT  Context of Malnutrition: Acute Illness   Malnutrition Status: At risk for malnutrition  Findings of the 6 clinical characteristics of malnutrition (Minimum of 2 out of 6 clinical characteristics is required to make the diagnosis of moderate or severe Protein Calorie Malnutrition based on AND/ASPEN Guidelines):  Energy Intake:  Mild decrease in energy intake  Weight Loss:  No weight loss     Body Fat Loss:  No body fat loss     Muscle Mass Loss:  No muscle mass loss    Fluid Accumulation:  No fluid accumulation      NUTRITION DIAGNOSIS   Inadequate oral intake related to impaired respiratory function as evidenced by intubation    Nutrition Monitoring and Evaluation:   Food/Nutrient Intake Outcomes:  Enteral Nutrition Intake/Tolerance  Physical Signs/Symptoms Outcomes:  Biochemical Data, Nutrition Focused Physical Findings, Weight, Constipation, Hemodynamic Status     OBJECTIVE DATA: Significant to nutrition assessment  Nutrition Related Findings: . +BM 2/2. +4L.  Wounds: Surgical Incision  Nutrition Goals: Tolerate nutrition support at goal rate, within 2 days     CURRENT NUTRITION THERAPIES  Current Tube Feeding (TF) Orders:  Feeding Route: Nasogastric  Formula: Peptide Based  Schedule: Continuous  Additives/Modulars: None  Water 
  Comprehensive Nutrition Assessment    RECOMMENDATIONS:  Initiate nutrition support w/in 48 hours if patient remains intubated  Consult for orders if appropriate  Nutrition Education: Education/Counseling not appropriate     NUTRITION ASSESSMENT:   Nutritional summary & status: New vent: Pt admitted and intubated after falling at home. Extubated yesterday however was intubated after oxygen requirements increased. No pressor requirements, sedated on propofol providing 114 kcal/day. NPO day #2, no recent weight loss prior to admission. Corpak in place, recommend starting TF w/in 48 hours if pt remains intubated. Per neuro avoid hypotonic fluids, minimal free water flushes if TF is started.   Admission // PMH: Intracranial hemorrhage//obstructive hydrocephalus, respiratory failure, MDS, MUD - Allo BMT    MALNUTRITION ASSESSMENT  Context of Malnutrition: Acute Illness   Malnutrition Status: At risk for malnutrition  Findings of the 6 clinical characteristics of malnutrition (Minimum of 2 out of 6 clinical characteristics is required to make the diagnosis of moderate or severe Protein Calorie Malnutrition based on AND/ASPEN Guidelines):  Energy Intake:  Mild decrease in energy intake  Weight Loss:  No weight loss     Body Fat Loss:  No body fat loss     Muscle Mass Loss:  No muscle mass loss    Fluid Accumulation:  No fluid accumulation      NUTRITION DIAGNOSIS   Inadequate oral intake related to impaired respiratory function as evidenced by intubation    Nutrition Monitoring and Evaluation:   Food/Nutrient Intake Outcomes:  Progression of Nutrition  Physical Signs/Symptoms Outcomes:  Biochemical Data, Nutrition Focused Physical Findings, Weight, Hemodynamic Status     OBJECTIVE DATA: Significant to nutrition assessment  Nutrition Related Findings: K 3.4. . +1.6L.  Wounds: None  Nutrition Goals: Initiate nutrition support     CURRENT NUTRITION THERAPIES  Diet NPO  PO Intake: NPO   PO Supplement 
  Current NIHSS 10    Nursing Core Measures for Stroke:   [x]   Education template documentation (STROKE/TIA). Select only risk factors that are applicable to patient when selecting risk factors.  [x]   Care Plan template documentation (Physiologic Instability - Neurosensory). Selecting this will add care plan rows to the flowsheet under the Neuro section of Head to Toe.  [x]   Verified Swallow Screen completed prior to PO intake of food, drink, medications.          Please verify correct medication route prior to administration for intubated patients, patients who can not swallow or have alternative routes of intake (NG, OG, GA), etc  [x]   VTE Prophylaxis: SCDs ordered/addressed; SCDs: On           (As a reminder, ASA, Plavix, and TPA/TNK are not VTE prophylaxis.)    Reviewed the Following Education with Patient and/or Family:   - Personalized risk factors for patient, along with changes, modifications that will help prevent stroke.  - Signs and Symptoms of Stroke: (Facial droop, weakness/numbness especially on one side, speech difficulty, sudden confusion, sudden loss of vision, sudden severe headache, sudden loss of balance or having difficulty walking, syncope, or seizure)  - How to activate EMS (911)   - Importance of Follow Up Appointments at Discharge   - Importance of Compliance with Medications Prescribed at Discharge  - Available community resources and stroke advocacy groups if needed    Patient and/or family member: with no evidence of learning.     Stroke Education booklet given to patient/family (or verified, if given already), which reviews above information. yes     Electronically signed by Ashley Fletcher RN on 1/24/2025 at 3:35 AM    
  Current NIHSS 19    Nursing Core Measures for Stroke:   [x]   Education template documentation (STROKE/TIA). Select only risk factors that are applicable to patient when selecting risk factors.  [x]   Care Plan template documentation (Physiologic Instability - Neurosensory). Selecting this will add care plan rows to the flowsheet under the Neuro section of Head to Toe.  [x]   Verified Swallow Screen completed prior to PO intake of food, drink, medications.          Please verify correct medication route prior to administration for intubated patients, patients who can not swallow or have alternative routes of intake (NG, OG, OK), etc  [x]   VTE Prophylaxis: SCDs ordered/addressed; SCDs: N/A Heparin           (As a reminder, ASA, Plavix, and TPA/TNK are not VTE prophylaxis.)    Reviewed the Following Education with Patient and/or Family:   - Personalized risk factors for patient, along with changes, modifications that will help prevent stroke.  - Signs and Symptoms of Stroke: (Facial droop, weakness/numbness especially on one side, speech difficulty, sudden confusion, sudden loss of vision, sudden severe headache, sudden loss of balance or having difficulty walking, syncope, or seizure)  - How to activate EMS (911)   - Importance of Follow Up Appointments at Discharge   - Importance of Compliance with Medications Prescribed at Discharge  - Available community resources and stroke advocacy groups if needed    Patient and/or family member: with no evidence of learning.     Stroke Education booklet given to patient/family (or verified, if given already), which reviews above information. yes     Electronically signed by Ashley Fletcher RN on 1/25/2025 at 4:55 AM    
  Current NIHSS 20    Nursing Core Measures for Stroke:   [x]   Education template documentation (STROKE/TIA). Please select only risk factors that are applicable to patient when selecting risk factors.  [x]   Care Plan template documentation (Physiologic Instability - Neurosensory). Selecting this will add care plan rows to the flowsheet under the Neuro section of Head to Toe.  [x]   Verified Swallow Screen completed prior to PO intake of food, drink, medications>          Please verify correct medication route prior to administration for intubated patients, patients who can not swallow or have alternative routes of intake (NG, OG, MO), etc  [x]   VTE Prophylaxis: SCDs ordered/addressed; SCDs: On           (As a reminder, ASA, Plavix, and TPA/TNK are not VTE prophylaxis.)    Reviewed the Following Education with Patient and/or Family:   - Personalized risk factors for patient, along with changes, modifications that will help prevent stroke.  - Signs and Symptoms of Stroke: (Facial droop, weakness/numbness especially on one side, speech difficulty, sudden confusion, sudden loss of vision, sudden severe headache, sudden loss of balance or having difficulty walking, syncope, or seizure)  - How to activate EMS (911)   - Importance of Follow Up Appointments at Discharge   - Importance of Compliance with Medications Prescribed at Discharge  - Available community resources and stroke advocacy groups if needed    Patient and/or family member: not available / not appropriate for education at this time.     Stroke Education booklet given to patient/family (or verified, if given already), which reviews above information. yes         Electronically signed by Vicky Hale RN on 1/26/2025 at 7:29 PM    
  Current NIHSS 20    Nursing Core Measures for Stroke:   [x]   Education template documentation (STROKE/TIA). Select only risk factors that are applicable to patient when selecting risk factors.  [x]   Care Plan template documentation (Physiologic Instability - Neurosensory). Selecting this will add care plan rows to the flowsheet under the Neuro section of Head to Toe.  [x]   Verified Swallow Screen completed prior to PO intake of food, drink, medications.          Please verify correct medication route prior to administration for intubated patients, patients who can not swallow or have alternative routes of intake (NG, OG, MT), etc  [x]   VTE Prophylaxis: SCDs ordered/addressed; SCDs: On           (As a reminder, ASA, Plavix, and TPA/TNK are not VTE prophylaxis.)    Reviewed the Following Education with Patient and/or Family:   - Personalized risk factors for patient, along with changes, modifications that will help prevent stroke.  - Signs and Symptoms of Stroke: (Facial droop, weakness/numbness especially on one side, speech difficulty, sudden confusion, sudden loss of vision, sudden severe headache, sudden loss of balance or having difficulty walking, syncope, or seizure)  - How to activate EMS (911)   - Importance of Follow Up Appointments at Discharge   - Importance of Compliance with Medications Prescribed at Discharge  - Available community resources and stroke advocacy groups if needed    Patient and/or family member: not available / not appropriate for education at this time.     Stroke Education booklet given to patient/family (or verified, if given already), which reviews above information. yes         Electronically signed by Bobby Preston RN on 1/27/2025 at 3:59 AM   
  Current NIHSS 21    Nursing Core Measures for Stroke:   [x]   Education template documentation (STROKE/TIA).   [x]   Care Plan template documentation (  [x]   Verified Swallow Screen completed prior to PO intake of food, drink, medications>          All meds given thru [facility]  [x]   VTE Prophylaxis: SCDs ordered/addressed; SCDs: On           (As a reminder, ASA, Plavix, and TPA/TNK are not VTE prophylaxis.)    Reviewed the Following Education with Patient and/or Family:   - Personalized risk factors for patient, along with changes, modifications that will help prevent stroke.  - Signs and Symptoms of Stroke: (Facial droop, weakness/numbness especially on one side, speech difficulty, sudden confusion, sudden loss of vision, sudden severe headache, sudden loss of balance or having difficulty walking, syncope, or seizure)  - How to activate EMS (911)   - Importance of Follow Up Appointments at Discharge   - Importance of Compliance with Medications Prescribed at Discharge  - Available community resources and stroke advocacy groups if needed    Patient and/or family member: not available / not appropriate for education at this time.     Stroke Education booklet given to patient/family (or verified, if given already), which reviews above information. yes         Electronically signed by Vicky Hale RN on 1/25/2025 at 6:57 PM    
  Current NIHSS 21    Nursing Core Measures for Stroke:   [x]   Education template documentation (STROKE/TIA). Select only risk factors that are applicable to patient when selecting risk factors.  [x]   Care Plan template documentation (Physiologic Instability - Neurosensory). Selecting this will add care plan rows to the flowsheet under the Neuro section of Head to Toe.  [x]   Verified Swallow Screen completed prior to PO intake of food, drink, medications.          Please verify correct medication route prior to administration for intubated patients, patients who can not swallow or have alternative routes of intake (NG, OG, AR), etc  [x]   VTE Prophylaxis: SCDs ordered/addressed; SCDs: N/A Heparin           (As a reminder, ASA, Plavix, and TPA/TNK are not VTE prophylaxis.)    Reviewed the Following Education with Patient and/or Family:   - Personalized risk factors for patient, along with changes, modifications that will help prevent stroke.  - Signs and Symptoms of Stroke: (Facial droop, weakness/numbness especially on one side, speech difficulty, sudden confusion, sudden loss of vision, sudden severe headache, sudden loss of balance or having difficulty walking, syncope, or seizure)  - How to activate EMS (911)   - Importance of Follow Up Appointments at Discharge   - Importance of Compliance with Medications Prescribed at Discharge  - Available community resources and stroke advocacy groups if needed    Patient and/or family member: with no evidence of learning.     Stroke Education booklet given to patient/family (or verified, if given already), which reviews above information.      Electronically signed by Ashley Fletcher RN on 1/26/2025 at 12:12 AM    
  Current NIHSS 23    Nursing Core Measures for Stroke:   [x]   Education template documentation (STROKE/TIA). Select only risk factors that are applicable to patient when selecting risk factors.  [x]   Care Plan template documentation (Physiologic Instability - Neurosensory). Selecting this will add care plan rows to the flowsheet under the Neuro section of Head to Toe.  [x]   Verified Swallow Screen completed prior to PO intake of food, drink, medications.          Please verify correct medication route prior to administration for intubated patients, patients who can not swallow or have alternative routes of intake (NG, OG, ME), etc  [x]   VTE Prophylaxis: SCDs ordered/addressed; SCDs: On           (As a reminder, ASA, Plavix, and TPA/TNK are not VTE prophylaxis.)    Reviewed the Following Education with Patient and/or Family:   - Personalized risk factors for patient, along with changes, modifications that will help prevent stroke.  - Signs and Symptoms of Stroke: (Facial droop, weakness/numbness especially on one side, speech difficulty, sudden confusion, sudden loss of vision, sudden severe headache, sudden loss of balance or having difficulty walking, syncope, or seizure)  - How to activate EMS (911)   - Importance of Follow Up Appointments at Discharge   - Importance of Compliance with Medications Prescribed at Discharge  - Available community resources and stroke advocacy groups if needed    Patient and/or family member: not available / not appropriate for education at this time.     Stroke Education booklet given to patient/family (or verified, if given already), which reviews above information. yes         Electronically signed by Bobby Preston RN on 1/27/2025 at 9:42 PM   
  Current NIHSS 26    Nursing Core Measures for Stroke:   [x]   Education template documentation   [x]   Care Plan template documentation (Physiologic Instability - Neurosensory).   [x]   Verified Swallow Screen completed prior to PO intake of food, drink, medications.          - PEG tube w cont TF  [x]   VTE Prophylaxis: SCDs ordered/addressed; SCDs: On           (As a reminder, ASA, Plavix, and TPA/TNK are not VTE prophylaxis.)    Reviewed the Following Education with Patient and/or Family:   - Personalized risk factors for patient, along with changes, modifications that will help prevent stroke.  - Signs and Symptoms of Stroke: (Facial droop, weakness/numbness especially on one side, speech difficulty, sudden confusion, sudden loss of vision, sudden severe headache, sudden loss of balance or having difficulty walking, syncope, or seizure)  - How to activate EMS (911)   - Importance of Follow Up Appointments at Discharge   - Importance of Compliance with Medications Prescribed at Discharge  - Available community resources and stroke advocacy groups if needed    Patient and/or family member: with no evidence of learning.     Stroke Education booklet given to patient/family (or verified, if given already), which reviews above information. Yes        Electronically signed by YONI LUTHER RN on 2/6/2025 at 10:55 PM    
  Current NIHSS 26    Nursing Core Measures for Stroke:   [x]   Education template documentation (STROKE/TIA). Select only risk factors that are applicable to patient when selecting risk factors.  [x]   Care Plan template documentation (Physiologic Instability - Neurosensory). Selecting this will add care plan rows to the flowsheet under the Neuro section of Head to Toe.  [x]   Verified Swallow Screen completed prior to PO intake of food, drink, medications.          Please verify correct medication route prior to administration for intubated patients, patients who can not swallow or have alternative routes of intake (NG, OG, MT), etc  [x]   VTE Prophylaxis: SCDs ordered/addressed; SCDs: On           (As a reminder, ASA, Plavix, and TPA/TNK are not VTE prophylaxis.)    Reviewed the Following Education with Patient and/or Family:   - Personalized risk factors for patient, along with changes, modifications that will help prevent stroke.  - Signs and Symptoms of Stroke: (Facial droop, weakness/numbness especially on one side, speech difficulty, sudden confusion, sudden loss of vision, sudden severe headache, sudden loss of balance or having difficulty walking, syncope, or seizure)  - How to activate EMS (911)   - Importance of Follow Up Appointments at Discharge   - Importance of Compliance with Medications Prescribed at Discharge  - Available community resources and stroke advocacy groups if needed    Patient and/or family member: verbalized understanding.     Stroke Education booklet given to patient/family (or verified, if given already), which reviews above information. yes         Electronically signed by Leigh Ann Molina RN on 2/10/2025 at 8:42 AM   
  Current NIHSS 26    Nursing Core Measures for Stroke:   [x]   Education template documentation (STROKE/TIA). Select only risk factors that are applicable to patient when selecting risk factors.  [x]   Care Plan template documentation (Physiologic Instability - Neurosensory). Selecting this will add care plan rows to the flowsheet under the Neuro section of Head to Toe.  [x]   Verified Swallow Screen completed prior to PO intake of food, drink, medications.          Please verify correct medication route prior to administration for intubated patients, patients who can not swallow or have alternative routes of intake (NG, OG, [place]), etc  [x]   VTE Prophylaxis: SCDs ordered/addressed; SCDs: On           (As a reminder, ASA, Plavix, and TPA/TNK are not VTE prophylaxis.)    Reviewed the Following Education with Patient and/or Family:   - Personalized risk factors for patient, along with changes, modifications that will help prevent stroke.  - Signs and Symptoms of Stroke: (Facial droop, weakness/numbness especially on one side, speech difficulty, sudden confusion, sudden loss of vision, sudden severe headache, sudden loss of balance or having difficulty walking, syncope, or seizure)  - How to activate EMS (911)   - Importance of Follow Up Appointments at Discharge   - Importance of Compliance with Medications Prescribed at Discharge  - Available community resources and stroke advocacy groups if needed    Patient and/or family member: verbalized understanding.     Stroke Education booklet given to patient/family (or verified, if given already), which reviews above information. yes         Electronically signed by Leigh Ann Molina RN on 2/8/2025 at 0915   
  Current NIHSS 26    Nursing Core Measures for Stroke:   [x]   Education template documentation (STROKE/TIA). Select only risk factors that are applicable to patient when selecting risk factors.  [x]   Care Plan template documentation (Physiologic Instability - Neurosensory). Selecting this will add care plan rows to the flowsheet under the Neuro section of Head to Toe.  [x]   Verified Swallow Screen completed prior to PO intake of food, drink, medications.          Please verify correct medication route prior to administration for intubated patients, patients who can not swallow or have alternative routes of intake (NG, OG, [place]), etc  [x]   VTE Prophylaxis: SCDs ordered/addressed; SCDs: On           (As a reminder, ASA, Plavix, and TPA/TNK are not VTE prophylaxis.)    Reviewed the Following Education with Patient and/or Family:   - Personalized risk factors for patient, along with changes, modifications that will help prevent stroke.  - Signs and Symptoms of Stroke: (Facial droop, weakness/numbness especially on one side, speech difficulty, sudden confusion, sudden loss of vision, sudden severe headache, sudden loss of balance or having difficulty walking, syncope, or seizure)  - How to activate EMS (911)   - Importance of Follow Up Appointments at Discharge   - Importance of Compliance with Medications Prescribed at Discharge  - Available community resources and stroke advocacy groups if needed    Patient and/or family member: verbalized understanding.     Stroke Education booklet given to patient/family (or verified, if given already), which reviews above information. yes         Electronically signed by Leigh Ann Molina RN on 2/9/2025 at 8:23 AM   
  Speech-Language Pathology  Dc    Order received, chart reviewed, pt currently intubated. d/w MARY Burgos, will dc at this time. Please re-refer as pt appropriate.      TRAVIS DELAROSA M.S./CCC-SLP #[id number]  Speech/language Pathologist  Pg. # [phone]      
 Latest Reference Range & Units 01/26/25 18:35   Potassium 3.5 - 5.1 mmol/L 2.6 (LL)   UOP 1.8 L during shift. ICU team notified   
4 Eyes Skin Assessment     NAME:  Maria Antonia Crawford  YOB: 1945  MEDICAL RECORD NUMBER:  [id number]    The patient is being assessed for  Transfer to New Unit    I agree that at least one RN has performed a thorough Head to Toe Skin Assessment on the patient. ALL assessment sites listed below have been assessed.      Areas assessed by both nurses:    Head, Face, Ears, Shoulders, Back, Chest, Arms, Elbows, Hands, Sacrum. Buttock, Coccyx, Ischium, Legs. Feet and Heels, and Under Medical Devices         Does the Patient have a Wound? yes  Blister L chest  Bruises scattered on BUE.   Healing incisions on anterior and posterior head.  Redness on R medial upper thigh and R posterior foot.       Jaylen Prevention initiated by RN: Yes  Wound Care Orders initiated by RN: No    Pressure Injury (Stage 3,4, Unstageable, DTI, NWPT, and Complex wounds) if present, place Wound referral order by RN under : No    New Ostomies, if present place, Ostomy referral order under : No     Nurse 1 eSignature: Electronically signed by Evelyn Miguel RN on 2/11/25 at 1:22 AM EST    **SHARE this note so that the co-signing nurse can place an eSignature**    Nurse 2 eSignature: {Esignature:[id number]}    
4 Eyes Skin Assessment     NAME:  Maria Antonia Crawford  YOB: 1945  MEDICAL RECORD NUMBER:  [id number]    The patient is being assessed for  Admission    I agree that at least one RN has performed a thorough Head to Toe Skin Assessment on the patient. ALL assessment sites listed below have been assessed.      Areas assessed by both nurses:    Head, Face, Ears, Shoulders, Back, Chest, Arms, Elbows, Hands, Sacrum. Buttock, Coccyx, Ischium, Legs. Feet and Heels, and Under Medical Devices         Does the Patient have a Wound? Yes wound(s) were present on assessment. LDA wound assessment was Initiated and completed by RN     Wound on scalp  Jaylen Prevention initiated by RN: Yes  Wound Care Orders initiated by RN: No    Pressure Injury (Stage 3,4, Unstageable, DTI, NWPT, and Complex wounds) if present, place Wound referral order by RN under : No    New Ostomies, if present place, Ostomy referral order under : No     Nurse 1 eSignature: Electronically signed by Dane Goodwin RN on 1/23/25 at 12:56 AM EST    **SHARE this note so that the co-signing nurse can place an eSignature**    Nurse 2 eSignature: Electronically signed by Hanna Oseguera RN on 1/23/25 at 5:48 AM EST    
ALVIN Cruz NP Made aware of order requiring CSF sample.    Electronically signed by YONI LUTHER RN on 2/7/2025 at 6:05 AM    
Bedside trach completed with Dr Mccrary and Dr Lujan. During the beginning of the procedure, ICP increased to 30 - sedation was increased, paralytic was given, and EVD was opened to drain 25 ML. After this was completed ICP remained below 20 for the rest of the procedure. Neurosurgery was notified as well.   
CSF labs collected by [facility] NP Will and sent off for testing. EVD drain set at 5mmHg per order and draining. Pt neuro unchanged, withdrawals from pain and does not follow commands. EEG going up now.  
Chart reviewed, no family present at bedside at this time, no spiritual care needs known.     01/23/25 1342   Encounter Summary   Encounter Overview/Reason Attempted Encounter;Initial Encounter   Service Provided For Patient not available   Referral/Consult From Rounding   Support System Unknown   Last Encounter  01/23/25  (GRD Unavailable)   Complexity of Encounter Low   Begin Time 1335   End Time  1340   Total Time Calculated 5 min   Assessment/Intervention/Outcome   Assessment Unable to assess       
Consulted for right heel - stage 1. Wound care orders placed. Wound Care to evaluate during ICU rounds on 1/31.  
During turn this morning, patient had 6-8 second long pause on monitor and then became bradycardic in the 40s and hypertensive with SBP into the 190s. Hydralazine given and Nicardipine restarted. ICU, NCC, and Neuro Surgery all informed of event.     HR regulated back to NSR in 80s and SBP in the 120s.   
EVD exchanged at bedside with Dr. Kay. CT after. During CT pt HR dropped down to 37. HR came up by itself and is now NS. CT results good, RN called NCC NP Sherry to discuss and she stated bleed looked stable and EVD in correct spot, pt  Donovan called with CT results per his request earlier.   
EVD was clamped this AM  ICP between 1-6mmHg throughout shift so far today.  Patient intermittently spontaneously opening eyes and tracking. Also intermittently following commands faintly with LUE. I was able to get a squeeze on command and a very weak thumbs up during assessments. No movement in RUE, small flexion in BLE. Pupils equal and reactive, cough and gag intact.  Family has been in and out of room during day. Updates given by this RN.   
Following secure message sent to house NP:    Patient admitted for :  Large posterior fossa intraparenchymal hemorrhage with scattered intraventricular and subarachnoid hemorrhages  --1/22/2025 right frontal EVD/suboccipital decompression and hematoma evacuation by neurosurgery  --MRI on 1/27 showed mildly increased size of the lateral and third ventricles with persistent posterior fossa mass effect   --1/27/2025 replacement of right EVD by neurosurgery, EVD clamp trials per NSGY    Palliative care on board and hospice referral for AM.  Pt is a DNR-CC.  She was just transferred to  from ICU.  BP had not been checked in ICU since 1100 at 150s systolic.  She is 200/190s now.  -120 and falls outside of the parameters to hold for HR over 100.  She was on 3.5 liters with sats 90s and now requiring 100% oxygen via trach tent.  How aggressive are we supposed to be with treatement.  Do we want to treat the BP and how aggressive should be get with oxygen if 100% trach tent does not get sats up?    Electronically signed by Emma Dasilva RN on 2/10/2025 at 11:19 PM    
I assessed the patient and met with the family again today (2/9/25).     The patient continues to have a very poor neurologic exam. The first few days after surgery, she followed commands, but her exam gradually worsened. She had some signs of alertness at times and would questionably follow very simple commands over the next week or so, but her mental status gradually declined and for the past week she has had little to no evidence of any alertness or response to examiner.     Follow up imaging revealed evacuation of the main hematoma mass in the left cerebellum, but persistent diffuse scattered cerebellar hemorrhage with persistent cerebellar edema and brainstem compression. MRI further illustrated the brainstem compression, and also ruled out obvious brainstem infarct. She had moderate hydrocephalus, and we used an EVD and targeted a relatively low daily CSF output for multiple days. Her ICP remained low, and clamping the EVD did not lead to increased ICP or progressive ventriculomegaly, nor worsened exam, so we eventually removed the EVD.     After two weeks of ongoing supportive care, her neurologic exam remained very poor without evidence of improvement. I discussed the poor prognosis with the family. Ultimately they elected for comfort measures, with plans to transition to comfort care on 2/10/25.    Scar Kay MD, PhD  Endovascular Neurosurgery  [facility]  [phone] (office direct line)  [phone] ([facility])    
ICP waveform dampened. NCC NP notified and at bedside. Drain flushed. ICP waveform remains dampened. Neuro exam unchanged. ICPs 8-12. No drain output since 1100. No new orders at this time.   
ICU Progress Note    Admit Date: 1/22/2025  Day: 3  Vent Day: 3 (extubated yesterday, re-intubated today)  IV Access:Peripheral, A-line  IV Fluids:NS 75cc/hr  Vasopressors:None                Antibiotics: S/p bernard-operative Ancef  Diet: Diet NPO    CC: Headache, nausea, vomiting, ground level fall     Interval history:   Now POD2 s/p right frontal extraventricular drain/ suboccipital decompression and hematoma evacuation, C1 laminectomy. This morning, the patient had episode of labored respirations, tachypnea RR low 30s, Sp02 90% which improved to 96% on nonrebreathing. ABG obtained 7.440/37.7/70.0. CXR unremarkable. She was intubated w/ RSI (etomidate and rocc). On intubation there was hard brown material superior to vocal cords that appeared to be obstructing her airway. That material was cleared with suctioning. Patient was started on prop gtt which she remains in this morning at 10. Still likely clearing paralytic from RSI, open eyes but not following commands. Currently on cardene gtt 7.5. Minimal vent settings. Corpak previously placed before extubation in anticipation of dysphasia. Did not have tube feeds running before intubation. Yoo in place, making adequate urine UO 2215cc/hr, net -363cc over last 24 hrs. Afebrile, reactive leukocytosis. Now s/p bernard-op Ancef. Will discuss with NCC timing of extubation. SQH started.      HPI:     Ms. Maria Antonia Crawford is a 79 y.o. female with a medical hx significant for MDS s/p bone marrow transplant, otherwise as listed in the MHx table below, who presented from home to the ED on 1/22 after a ground level fall at home.     Patient unable to provide entire history of present illness.  Family at bedside providing collateral history.  Family states that the patient is usually AO x 3, functionally independent at home, performs all activities of daily living on her own and works from home.  Her  states that the patient developed a headache this morning around 9 AM 
ICU Progress Note    Admit Date: 1/22/2025  Day: 7  Vent Day: 6 (extubated yesterday, re-intubated today)  IV Access:Peripheral  IV Fluids:Cardene gtt  Vasopressors:None  Antibiotics: Ceftriaxone, S/p bernard-operative Ancef  Diet: Diet NPO  ADULT TUBE FEEDING VOLUME BASED; Nasogastric; Peptide Based; 200; Continuous; 1,200; 24    CC: Headache, nausea, vomiting, ground level fall     Interval history:   No acute events overnight.  Opening eyes this morning, not tracking or moving extremities.  Withdraws to pain.  EEG with no events yesterday.  Every 2 hours neurochecks.  SBP under 160 on 2.5 Cardene.  On ventilator.  Tube feeds at 50 cc with 200 cc every 4 hour free water flushes.  Urine output 205 0 cc, net +202.  BMP pending.  Afebrile on ceftriaxone.  No leukocytosis.  Subcu heparin for DVT prophylaxis. Opening eyes overnight is improvement. Will continue to monitor for poss trach if she does not improve.      HPI:   Ms. Maria Antonia Crawford is a 79 y.o. female with a medical hx significant for MDS s/p bone marrow transplant, otherwise as listed in the MHx table below, who presented from home to the ED on 1/22 after a ground level fall at home.     Patient unable to provide entire history of present illness.  Family at bedside providing collateral history.  Family states that the patient is usually AO x 3, functionally independent at home, performs all activities of daily living on her own and works from home.  Her  states that the patient developed a headache this morning around 9 AM with associated nausea and vomiting.  He was in another room and the patient was in the bathroom when he heard a thud.  She went to check on her and found her laying facedown on the ground.  He attempted to pick her up but could not do this alone.  She then attempted to pull herself up using a bathroom door but could not stand up.  The  then called his son and EMS while the son was in a route.  EMS transported the patient to 
Intubation Note:     0623 ICU and NCC NP at bedside. Pt noted with worsening WOB. Diaphragmatic, accessory muscle use. Decision to reintubate.   0625 RT at bedside.   0633 21.4 etom given, 71 mg dominik given.   0636 ETT in, color change. Size 8, 24 @ lip.   0638 Prop gtt started.   0640 STAT cxray for ett placement verification.   
Met with patient's friends at bedside who spoke about their connection to patient. Patient is supported by , children, grandchildren, and friends.      01/27/25 1356   Encounter Summary   Encounter Overview/Reason Follow-up   Service Provided For Friend   Referral/Consult From [facility] [place]   Support System Spouse;Children;Family members;Friends/neighbors   Last Encounter  01/27/25  (GRD F/U Occassionally for family support)   Complexity of Encounter Low   Begin Time 1345   End Time  1400   Total Time Calculated 15 min   Spiritual/Emotional needs   Type Spiritual Support;Emotional Distress   Assessment/Intervention/Outcome   Assessment Fearful;Sad;Coping   Intervention Active listening;Explored/Affirmed feelings, thoughts, concerns;Explored Coping Skills/Resources;Life review/[place];Prayer (assurance of)/[place];Sustaining Presence/Ministry of presence   Outcome Comfort;Engaged in conversation;Expressed feelings, needs, and concerns;Receptive     Spiritual Health History and Assessment/Progress Note  [facility]    (P) Follow-up,  ,  ,      Name: Maria Antonia Crawford MRN: [id number]    Age: 79 y.o.     Sex: female   Language: English   Adventism: Oriental orthodox   Intracranial hemorrhage (HCC)     Date: 1/27/2025            Total Time Calculated: (P) 15 min              Spiritual Assessment began in [facility] ICU        Referral/Consult From: (P) Rounding   Encounter Overview/Reason: (P) Follow-up  Service Provided For: (P) Friend    Lisa, Belief, Meaning:   Patient unable to assess at this time  Family/Friends have beliefs or practices that help with coping during difficult times      Importance and Influence:  Patient unable to assess at this time  Family/Friends have no beliefs influential to healthcare decision-making identified during this visit    Community:  Patient Other: Friends at bedside  Family/Friends feel well-supported. Support system includes: Spouse/Partner and Friends    Assessment and Plan of 
NCC NP made aware of BP cuff and BP alnie not correlating. BP cuff reading higher than amy. Received verbal order to treat per ailine.     Amy flushed and leveled per protocol. Currently 140's sys.     Neuro assessment unchaged. PERRLA 3mm brisk reaction. Able to follow commands in 4 extremities.     Fall and EVD precautions in place.   
Neuro exam remains unchanged. Still unresponsive to stimuli with exception to withdraw from pain in BLE.     Pt temperature increasing throughout shift. Now 38.5. Tylenol administered.  
Neurocritical team okay with doing MRI's tomorrow due to patients increase in BP & ICP post trach placement.   
Nutrition Note    Patient's chart reviewed today and RD will hold nutrition follow-up d/t change in status.    Nutrition comfort care to be provided unless aggressive tx is desired. No additional nutrition intervention will be initiated at this time.     Consult dietitian if additional nutrition intervention desired.      Denia Paiz RD, LD  Contact Number: [phone]    
Palliative Care Chart Review  and Check in Note:     NAME:  Maria Antonia Crawford  Admit Date: 1/22/2025  Hospital Day:  Hospital Day: 17   Current Code status: DNR-CCA    Palliative care is continuing to following Ms. Crawford for symptom management,  and goals of care discussion as needed. Patient's chart reviewed today 2/7/25.      Saw pt at the bedside. She remains unresponsive, currently on a breathing trial. D/w CHANTAL Jaime with [facility]. Dr. Kay did meet with family and reportedly wanted to give pt a little more time for suspected infection to clear prior to making decisions regarding goals of care.     Called pt's  Donovan, no answer, left  with callback number.     1242: Returned to pt's room, a friend was visiting but no family members. Awaiting call back from pt's .     1323: Called pt's  Donovan. He reported that he spoke with Dr. Kay yesterday and they want to give pt a few more days to see how she does but they are considering transition to comfort care if she does not improve. He reported that he does not want pt to go to LTACH in her current condition if she does not improve in the next few days. He reported Dr. Kay was agreeable to this plan and that he will meet with Dr. Kay again this afternoon.       The following are the currently established goals/code status, and Symptom management.     Goals of care: Pt's  stated that they are considering comfort care if she does not improve in the next 2-3 days. They are not ready to make that decision today. They would like to speak with Dr. Kay again today.     Code status: DNRCCA    Discharge plan: CHANTAL Pitt - CNP  02/07/25  11:09 AM    
Palliative Care Chart Review  and Check in Note:     NAME:  Maria Antonia Crawford  Admit Date: 1/22/2025  Hospital Day:  Hospital Day: 20   Current Code status: DNR-CC    Palliative care is continuing to following Ms. Crawford for symptom management,  and goals of care discussion as needed. Patient's chart reviewed today 2/10/25.      Pt is resting comfortably in bed. Called pt's , offered emotional support and offered a hospice referral.  would be interested in keeping his wife here under [facility] hospice, but wants to wait until tomorrow to make a hospice referral. D/w RN Leigh Ann.    The following are the currently established goals/code status, and Symptom management.     Goals of care: Comfort care.    Code status: DNR-CC    Discharge plan: LORENA Mason NP  Palliative Care  [phone]    
Palliative Care Chart Review  and Check in Note:     NAME:  Maria Antonia Crawford  Admit Date: 1/22/2025  Hospital Day:  Hospital Day: 21   Current Code status: DNR-CC    Palliative care is continuing to following Ms. Crawford for symptom management,  and goals of care discussion as needed. Patient's chart reviewed today 2/11/25.      Met with daughter earlier today and she called her father, who wanted to wait to talk until they arrived at the hospital. I spoke with them at the bedside at 3 PM and they requested a referral to [facility]. They will consider GIP vs inpatient unit if she qualifies. Offered emotional support. D/w CM Cat.    The following are the currently established goals/code status, and Symptom management.     Goals of care: Family wants to proceed with comfort care.    Code status: DNR-CC    Discharge plan: hospice GIP vs hospice inpatient unit    Jayla Mason NP  Palliative Care  [phone]    
Patient given PRN medications per the MAR as ordered to maintain comfort. RT managed trach and provided suctioning as needed. Took awhile for patient to recover after trach care. Now sats 89%- 90%. Patient repositioned as needed for comfort. Yoo care provided. Will continue to monitor patient closely.   
Patient placed in SBT   01/31/25 1641   Patient Observation   Pulse 85   Respirations 26   SpO2 98 %   Vent Information   Vent Mode CPAP/PS   Ventilator Settings   Vt (Set, mL) 400 mL   Resp Rate (Set) 16 bpm   PEEP/CPAP (cmH2O) 5   Insp Time (sec) 0.7 sec   Pressure Support (cm H2O) 8 cm H2O   Vent Patient Data (Readings)   Vt (Measured) 269 mL   Peak Inspiratory Pressure (cmH2O) 10 cmH2O   Rate Measured 23 br/min   Minute Volume (L/min) 8.1 Liters   Mean Airway Pressure (cmH2O) 6 cmH20   Plateau Pressure (cm H2O) 13 cm H2O   Driving Pressure 8   I:E Ratio 1:2.10   Dynamic Compliance (L/cm H2O) 42 L/cm H2O   Vent Alarm Settings   Low Pressure (cmH2O) 10 cmH2O   High Pressure (cmH2O) 40 cmH2O   Low Minute Volume (lpm) 4 L/min   High Minute Volume (lpm) 20 L/min   RR Low (bpm) 12   RR High (bpm) 40 br/min       
Patient placed in SBT per protocol   02/04/25 1108   Patient Observation   Pulse 89   Respirations 26   SpO2 100 %   Vent Information   Vent Mode (S)  CPAP/PS   Ventilator Settings   Vt (Set, mL) 400 mL   Resp Rate (Set) 16 bpm   PEEP/CPAP (cmH2O) 5   Insp Time (sec) 0.7 sec   Pressure Support (cm H2O) 10 cm H2O   Vent Patient Data (Readings)   Vt (Measured) 299 mL   Peak Inspiratory Pressure (cmH2O) 10 cmH2O   Rate Measured 25 br/min   Minute Volume (L/min) 9.9 Liters   Mean Airway Pressure (cmH2O) 6 cmH20   Plateau Pressure (cm H2O) 11 cm H2O   Driving Pressure 6   I:E Ratio 1:2.10   Dynamic Compliance (L/cm H2O) 43 L/cm H2O   Vent Alarm Settings   Low Pressure (cmH2O) 10 cmH2O   High Pressure (cmH2O) 40 cmH2O   Low Minute Volume (lpm) 4 L/min   High Minute Volume (lpm) 20 L/min   Low Exhaled Vt (ml) 200 mL   RR Low (bpm) 10   RR High (bpm) 40 br/min       
Patient's  at bedside declined spiritual care needs at this time, expressed optimism for the care plan today.     02/03/25 1039   Encounter Summary   Encounter Overview/Reason Follow-up;Family Care   Service Provided For Family   Referral/Consult From [facility]   Support System Spouse;Children;Friends/neighbors   Last Encounter  02/03/25  (GRD F/U occasionally for family support)   Complexity of Encounter Low   Begin Time 1030   End Time  1035   Total Time Calculated 5 min   Assessment/Intervention/Outcome   Assessment Hopeful   Intervention Active listening   Outcome Refused/Declined   Plan and Referrals   Plan/Referrals Continue to visit, (comment)  (Occasionally)     Spiritual Health History and Assessment/Progress Note  [facility]    (P) Follow-up, Family Care,  ,  ,      Name: Maria Antonia Crawford MRN: [id number]    Age: 79 y.o.     Sex: female   Language: English   Rastafari: Druze   Intracranial hemorrhage (HCC)     Date: 2/3/2025            Total Time Calculated: (P) 5 min              Spiritual Assessment continued in [facility] ICU        Referral/Consult From: (P) Rounding   Encounter Overview/Reason: (P) Follow-up, Family Care  Service Provided For: (P) Family    Lisa, Belief, Meaning:   Patient unable to assess at this time  Family/Friends have beliefs or practices that help with coping during difficult times      Importance and Influence:  Patient unable to assess at this time  Family/Friends have no beliefs influential to healthcare decision-making identified during this visit    Community:  Patient Other:  at bedside  Family/Friends feel well-supported. Support system includes: Spouse/Partner, Children, and Friends    Assessment and Plan of Care:     Patient Interventions include: Other: Family support  Family/Friends Interventions include: Facilitated expression of thoughts and feelings and Affirmed coping skills/support systems    Patient Plan of Care: Spiritual Care 
Patients neurological assessment unchanged thus far through shift. Patient does not follow commands but responds to pain in the lower extremities. See Flowsheets for full assessment. Hourly ICPs have been 0-2 and hourly EVD output has been 0-5.   
Physical Therapy/Occupational Therapy  Sign off - Pt intubated and sedated  Chart review completed.  PT/OT orders received.  Pt is currently intubated and sedated.  Will sign off, please re-order once pt is medically appropriate.    Kisha Nunez, PT  Loretta Rosen, OTR/L      
Pt A-line not reading anymore. Does not pull back or flush and waveform is flat. RN notified ICU Resident Dr. Nation about need for replacement or rewiring. Said they will look into it.   
Pt Tube Feed changed from 20ml/hr to 50mL/hr after discussion with Denia Chacko d/t pt paused for one day for PEG placement, okayed to start back at goal of 50mL/hr, orders changed and in place.   
Pt arrived from ICU at 2150 to 3308, BP was 193/91, HR 93, 91% on 3.5L. Pt was somnolent, non interactive. NP on call Kirstin was made aware 2213, ordered to give hydralazine.   RN came back later with medications, noted pt's HR hanging in the 110s, SaO2 dropped to 86% @2240. Attempted to gradually increase O2 to 6L but pt still sat at 86% unchanged. Charge MARY Carter and RT Prince were called for help. Hydralazine held as HR was now >100. Morphine given via PEG tube for air hunger @2309.  Charge MARY Carter contacted NP regarding about pt's current status (see Emma's progress note). NP ordered labetalol but was made aware that we couldn't give it on 3S so ordered one time dose of hydralazine.   RT made it to bedside, pt was placed on 100% FiO2 at 6L. SaO2 improved to 100%. RT changed out canula.  Pt's vital signs improved to 100 HR and 175/89 BP, hydralazine was not given as sbp was now <180.  RN noted bloody draimange from pt's trach which wasn't there earlier. RT was notified, saying \"it is not uncommon to see that with the trach\" and that he'd stop by later to help with suctioning. NP made aware 0007. No new orders received.   RT by bedside setting up inner suction 0045.   Pt seems to be resting more comfortable now. Bed alarm remains active.   
Pt cEEG testing completed and stopped per Nancy Anderson at 0940  
Pt down to CT with this RN and RT. Tolerated well. BP upon return high, PRNs given and Cardene restarted.   
Pt hasn't had any output from EVD for this hour. Pt's ICP is 0. Pt is following commands in the RUE and withdraws to pain in all other extremities. Pupils equal and reactive. NCC NP made aware.   
Pt intubated due to respiratory failure.Size 4 glidescope larygoscope used to visualize vocal chords. Size 8mm ET tube placed through the vocal chords to 24 cm at the teeth. ET tube cuff inflated to MOV. Positive color change on the etco2 detector and waveform capnography of 38 mmHg with good waveform noted. Bilateral breath sounds heard. X-ray called to confirm placement. Pt placed on mechanical ventilator with ordered settings. No visible trauma noted. Pt resting comfortably.     
Pt neuro exam still poor, originally related to RSI medication administered for intubation at 0630. Pt still does not respond to commands or painful stimulus. No blink to threat. Cough and gag intact. Neuro NP notified and stat head CT ordered.  
Pt noted with labored breathing, tachypneic RR in high 30s, spo2 low 90's. ICU residents and NCC NP called to bedside. NRB placed and stat Cxray ordered. Stat ABG results are as follows:      Latest Reference Range & Units 01/24/25 03:50   Hemoglobin, Art, Extended g/dL 12.40   pH, Arterial 7.350 - 7.450  7.440   pCO2, Arterial 35.0 - 45.0 mmHg 37.7   pO2, Arterial 75.0 - 108.0 mmHg 70.0 (L)   HCO3, Arterial 21 - 29 mmol/L 26   TCO2 (calc), Art mmol/L 27   Base Excess, Arterial -3.0 - 3.0 mmol/L 1.5   O2 Sat, Arterial 93 - 100 % 96   Methemoglobin, Arterial 0.0 - 1.4 % 0.2   Carboxyhgb, Arterial 0.0 - 1.5 % 1.8 (H)     
Pt off the floor for pulse ox check  
Pt placed on PSV 10/5 30% per order.  
Pt resting in bed. VSS. No acute changes. With withdraws from pain x4, not following commands at this time. Plan for PEG placement today with ENDO team, currently on schedule for 1330. Pt NPO since midnight.   
Pt resting in bed. VSS. Weaning Cardene. Pt intermittently wiggling toes to commands. Neurosurgery NP Addison at bedside changing EVD dressing and flushing drain d/t EVD not draining at this time.   
Pt taken off vent and placed on 24% trach mask.  
Pt taken to CT with 2 RN and RT, no complications.   
Pt tolerated MRI well. Labetalol given for SBP in the 170s. Repeat bp in the 150s.   
Pt transferred to 3308 with RN. All Pt belongings with Pt.  Andrea called and informed on bed change. Pt comfortable and had no complications in route. RN at bedside to receive any updates and questions answered.     Elisa Gardner RN    
Pt was extubated per written orders and placed on 3lpm nc. Pt was suctioned orally and via ETT prior to extubation. Pt appears to be in no distress at this time.   
RN called [facility] NP Krystal that TPA at bedside from pharmacy. This hours ICP was 4 at 0800 check with output of 7mL.   
RN called RRT asking for a device that may help prevent the patient from biting her tongue.  RRT placed an oral airway in patient's mouth.   Will continue to monitor.  RN aware of oral airway placement.   
RN updated [facility] on patient status and families request to withdrawal care, [facility] has requested a phone call when Cardiac Arrest occurs.  
RN worried that the patient was on a aerosol T-piece via 8L and 35%, due to MD stating that the patient needs to be on 3L or less. Please note that the liter flow on the aerosol doesn't matter, as we are only looking at the FiO2. 28% = 2L and 35% = 4L cannula.  
Suctioned after RN lamped EVD. Bite block moved out beyond teeth. Repositioned back into mouth. ETT still 22 @ teeth   01/28/25 0509   Patient Observation   Pulse 75   Respirations 18   SpO2 98 %   ETCO2 (mmHg) 32 mmHg   Breath Sounds   Respiratory Pattern Regular   Breath Sounds Bilateral Diminished  (rhonchi, cleared w/ sxn)   Vent Information   Ventilator Day(s) 4   Ventilator ID [id number]   Equipment Changed HME;Suction catheter;Expiratory Filter  ([facility])   Vent Mode AC/PRVC   $Ventilation $Subsequent Day   Ventilator Settings   Vt (Set, mL) 400 mL   Resp Rate (Set) 16 bpm   PEEP/CPAP (cmH2O) 5   Insp Time (sec) 0.9 sec   Vent Patient Data (Readings)   Vt (Measured) 367 mL   Peak Inspiratory Pressure (cmH2O) 25 cmH2O   Rate Measured 21 br/min   Minute Volume (L/min) 8.3 Liters   Mean Airway Pressure (cmH2O) 11 cmH20   Plateau Pressure (cm H2O) 12 cm H2O   Driving Pressure 7   Inspiratory Time 0.9 sec   I:E Ratio 1:1.90   Flow Sensitivity 2 L/min   Dynamic Compliance (L/cm H2O) 18 L/cm H2O   I Time/ I Time % 0.9 s   Insp Rise Time (%) 300 %   Backup Apnea On   Backup Rate 16 Breaths Per Minute   Backup Vt 400   Backup I Time   (.9)   Vent Alarm Settings   Low Pressure (cmH2O) 10 cmH2O   High Pressure (cmH2O) 50 cmH2O   Low Minute Volume (lpm) 4 L/min   High Minute Volume (lpm) 20 L/min   Low Exhaled Vt (ml) 300 mL   High Exhaled Vt (ml) 1100 mL   RR Low (bpm) 12   RR High (bpm) 40 br/min   Apnea (secs) 20 secs   ETCO2/TCM Max 50 mmHg   ETCO2/TCM Min 20 mmHg   Additional Respiratoray Assessments   Humidification Source HME   Circuit Condensation Drained   Ambu Bag With Mask At Bedside Yes   Airway Clearance   Suction ET Tube   Subglottic Suction Done No   Suction Device Inline suction catheter   Suction Catheter Size 14 Fr   Sputum Method Obtained Endotracheal   Sputum Amount Moderate   Sputum Color/Odor Tan;Yellow   Sputum Consistency Thick   ETT    Placement Date/Time: 01/24/25 0646   Present on Admission/Arrival: No 
[facility] -  Clinical Pharmacy Note    Vancomycin - Management by Pharmacy    Consult Date(s): 2/9  Consulting Provider(s): Ingrid Guaman    Assessment / Plan  Pneumonia - Vancomycin  Concurrent Antimicrobials: Ceftriaxone  Will adjust per Renal Dose Policy to 2000mg IV q24h for pneumonia indication.  Day of Vanc Therapy / Ordered Duration: 2 of 7   Current Dosing Method: Bayesian-Guided AUC Dosing  Therapeutic Goal: -600 mg/L*hr  Current Dose / Plan:   Renal function stable; SCr remains at 0.5 today.  No UOP documented on one shift yesterday; however, 0.6 mL/kg/hr out in the past 12 hrs.    Currently on 1250mg IV q12h (as previously on this admission)  Level today = 25.6 mg/L, drawn ~6 hr after 2nd dose.   Calculated AUC = 750 with trough = 21 mg/L, which is well above goal range.    Will allow time for washout, and adjust to 1000mg IV q12h later today.    Further levels will be planned as needed; likely in ~2 days.    Will continue to monitor clinical condition and make adjustments to regimen as appropriate.    Thank you for consulting pharmacy,  Ghada MortensenD., BCPS   2/10/2025 8:13 AM  Wireless: [phone]          Interval update:  UOP zero over day shift yesterday; improved to 0.6 mL/kg/hr in the past 12 hrs.  Vancomycin level high this AM; adjusting dose.  Family to consider withdrawal today.      Subjective/Objective:   Maria Antonia Crawford is a 79 y.o. female with a PMHx significant for MDS s/p bone marrow transplant, thrombocytopenia, HLP, hx C.diff, who presented after a fall at home.   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  Course complicated by MSSA PNA, treated x 5 days (1/25-1/29) with Ceftriaxone/Vanc, as well as respiratory failure s/p trach and PEG.  Now with new fevers and new trach aspirate culture with Staph aureus and Proteus.     Pharmacy is consulted to dose vancomycin.    Leon 
[facility] -  Clinical Pharmacy Note    Vancomycin - Management by Pharmacy    Consult Date(s): 1/25  Consulting Provider(s): surjit Tinoco    Assessment / Plan  PNA - Vancomycin  Concurrent Antimicrobials: none  Day of Vanc Therapy / Ordered Duration: 1/7  Current Dosing Method: Bayesian-Guided AUC Dosing  Therapeutic Goal: -600 mg/L*hr  Current Dose / Plan:   Renal function stable at baseline. 0.5 today  UOP ~0.9 mL/kg/hr past 24h + unmeasured occurrences  Currently receiving 1000 mg IV Q12h  Predicted AUC = 558 mg/L*hr ; Trough = 15.3 mg/L  Will plan to continue current regimen  Will obtain level 1/27 AM to assess kinetics  Will continue to monitor clinical condition and make adjustments to regimen as appropriate.    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 female with a PMHx significant for MDS s/p bone marrow transplant, otherwise as listed in the MHx table below, who presented from home to the ED on 1/22 after a ground level fall at home. Pt had headache at home with associated N/V and was then found by  face down on ground. Pt presented with uncontrolled hypertension to 218/109. Found to have large posterior ICH on imaging and is s/p procedure    Pharmacy is consulted to dose vancomycin.    Ht Readings from Last 1 Encounters:   01/23/25 1.6 m (5' 3\")     Wt Readings from Last 1 Encounters:   01/26/25 65.5 kg (144 lb 6.4 oz)     Current & Prior Antimicrobial Regimen(s):  Vanc - PTD  1750 mg IV x1 
[facility] -  Clinical Pharmacy Note    Vancomycin - Management by Pharmacy    Consult Date(s): 1/25  Consulting Provider(s): surjit Tinoco    Assessment / Plan  PNA - Vancomycin  Concurrent Antimicrobials: none  Day of Vanc Therapy / Ordered Duration: 1/7  Current Dosing Method: Bayesian-Guided AUC Dosing  Therapeutic Goal: -600 mg/L*hr  Current Dose / Plan:   Renal function stable at baseline. 0.5 today  UOP ~1.2 mL/kg/hr past 24h  Currently receiving 1000 mg IV Q12h  Predicted AUC = 554 mg/L*hr ; Trough = 15.3 mg/L  Will plan to continue current regimen  Will obtain level~48h to assess kinetics unless otherwise clinically indicated  Will continue to monitor clinical condition and make adjustments to regimen as appropriate.    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 female with a PMHx significant for MDS s/p bone marrow transplant, otherwise as listed in the MHx table below, who presented from home to the ED on 1/22 after a ground level fall at home. Pt had headache at home with associated N/V and was then found by  face down on ground. Pt presented with uncontrolled hypertension to 218/109. Found to have large posterior ICH on imaging and is s/p procedure    Pharmacy is consulted to dose vancomycin.    Ht Readings from Last 1 Encounters:   01/23/25 1.6 m (5' 3\")     Wt Readings from Last 1 Encounters:   01/25/25 68.8 kg (151 lb 10.8 oz)     Current & Prior Antimicrobial Regimen(s):  Vanc - PTD  1750 mg IV x1 1/25  1000 mg IV Q12h 
[facility] -  Clinical Pharmacy Note    Vancomycin - Management by Pharmacy    Consult Date(s): 1/25  Consulting Provider(s): Dr Tinoco    Assessment / Plan  PNA - Vancomycin  Concurrent Antimicrobials: none  Day of Vanc Therapy / Ordered Duration: 3 of 7  Current Dosing Method: Bayesian-Guided AUC Dosing  Therapeutic Goal: -600 mg/L*hr  Current Dose / Plan:   Renal function stable; SCr 0.5 today.    UOP 1.8 mL/kg/hr past 24h  Currently on 1000mg IV q12h  Level today = 12.6 mg/L, drawn ~8h after prior dose  Calculated AUC = 457 mg/L*hr with trough = 11.9 mg/L  Given patient remains febrile, will increase regimen to 1250mg IV q12h with next dose.    New regimen predicts an AUC of ~568 with trough ~14.9 mg/L.    Plan for repeat level in ~2 days to confirm kinetics.    PNA panel with MSSA (mecA gene not detected).  MRSA nares negative.   Consider de-escalation to ceftriaxone.      Will continue to monitor clinical condition and make adjustments to regimen as appropriate.    Thank you for consulting pharmacy,  Ghada Vora PharmD., BCPS   1/27/2025 7:34 AM  Wireless: [phone]          Interval update:  Patient remains mechanically ventilated, off sedation.  Nicardipine infusion continues for BP control.  Fevers continue;  Tmax 101.8 degF this AM.   PNA panel with MSSA.      Subjective/Objective:   Maria Antonia Crawford is a 79 y.o. female with a PMHx significant for MDS s/p bone marrow transplant, thrombocytopenia, HLP, hx C.diff, who presented after a fall at home.   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  Patient was extubated on 1/23 post-procedure, but required re-intubation on 1/24 d/t upper airway obstruction.  BAL now growing MRSA.        Pharmacy is consulted to dose vancomycin.    Ht Readings from Last 1 Encounters:   01/23/25 1.6 m (5' 3\")     Wt Readings from Last 1 Encounters:   01/27/25 65.9 kg 
Trach care done pt tolerated well   
Trach care done pt tolerated well.   
Tube feeding held per ICU team for trach today.   
Withdrawn ETT by 2cm per MD during Bronchoscopy. ETT now at 22cm at the Lip     
Andrea, spouse     Personally reviewed Lab Studies and Imaging       Subjective:     Chief Complaint: SASHA Crawford is a 79 y.o. female who presented after found down and unresponsive at home after complaining of a headache. She was found to have intraparenchymal brain hemorrhage and admitted to ICU for further care.    Remains in ICU on trach and ventilator. No acute clinical changes noted. EVD removed today    Review of Systems:      Pertinent positives and negatives discussed in HPI    Objective:     Intake/Output Summary (Last 24 hours) at 2/9/2025 1302  Last data filed at 2/9/2025 1000  Gross per 24 hour   Intake 1356 ml   Output 645 ml   Net 711 ml      Vitals:   Vitals:    02/09/25 1000 02/09/25 1100 02/09/25 1200 02/09/25 1212   BP: (!) 117/59 136/61 137/63    Pulse: 85 85 85 85   Resp: 19 18 19 20   Temp:   99 °F (37.2 °C)    TempSrc:   Axillary    SpO2: 96% 100% 99% 98%   Weight:       Height:             Physical Exam:      General: Laying in bed, eyes closed, minimally responsive  Eyes: EOMI  ENT: Trach in place  Cardiovascular: Regular rate.  Respiratory: Clear to auscultation  Gastrointestinal: Soft, non tender  Genitourinary: no suprapubic tenderness  Musculoskeletal: No edema  Skin: warm, dry  Neuro: Does not open eyes or move extremities to voice; withdraws lower extremities to noxious stimuli        Medications:   Medications:    amLODIPine  10 mg Oral Nightly    cefTRIAXone (ROCEPHIN) IV  1,000 mg IntraVENous Q24H    vancomycin  1,750 mg IntraVENous Once    vancomycin HCl in Dextrose  1,250 mg IntraVENous Q12H    levETIRAcetam  500 mg IntraVENous Q12H    ipratropium 0.5 mg-albuterol 2.5 mg  1 Dose Inhalation Q4H WA RT    sodium chloride (Inhalant)  4 mL Nebulization BID    lisinopril  40 mg Per NG tube Daily    senna  2 tablet Oral BID    polyethylene glycol  17 g Oral BID    folic acid  1 mg Oral Daily    CENTRUM/CERTA-TREMAINE with minerals oral  15 mL Per G Tube Daily    pantoprazole 
Continuous  Additives/Modulars: None  Water Flushes: 30 mL q4h  Current TF Provides: Vital AF 1.2 @ 50 mL/hr to provide: 1200 mL TV, 1440 kcal, 90 g/pro and 937 mL FW  PO Intake: NPO   PO Supplement Intake:NPO  Additional Sources of Calories/IVF:N/A     COMPARATIVE STANDARDS  Energy (kcal):  3079-3258 (20-25 kcal/kg CBW)     Protein (g):   (1.2-2.0 g/kg CBW)       Fluid (ml/day):  Per neuro-critical care    ANTHROPOMETRICS  Current Height: 160 cm (5' 3\")  Current Weight - Scale: 66.2 kg (145 lb 15.1 oz)    Admission weight: 70.1 kg (154 lb 9.6 oz)    The patient will be monitored per nutrition standards of care. Consult dietitian if additional nutrition interventions are needed prior to RD reassessment.     Denia Paiz RD, LD  [place]:  [phone]      
Friends    Assessment and Plan of Care:     Patient Interventions include: Other: Family support  Family/Friends Interventions include: Facilitated expression of thoughts and feelings, Explored spiritual coping/struggle/distress, and Affirmed coping skills/support systems    Patient Plan of Care: Spiritual Care available upon further referral  Family/Friends Plan of Care: Spiritual Care available upon further referral    Electronically signed by Chaplain Rogelio on 1/30/2025 at 11:26 AM    
Ingrid Guaman MD        Or    dextrose bolus 10% 250 mL  250 mL IntraVENous PRN Ingrid Guaman MD        glucagon injection 1 mg  1 mg SubCUTAneous PRN Ingrid Guaman MD        dextrose 10 % infusion   IntraVENous Continuous PRN Ingrid Guaman MD        senna (SENOKOT) tablet 17.2 mg  2 tablet Oral BID Ingrid Guaman MD   17.2 mg at 02/06/25 0732    polyethylene glycol (GLYCOLAX) packet 17 g  17 g Oral BID Ingrid Guaman MD   17 g at 02/06/25 0732    modafinil (PROVIGIL) tablet 200 mg  200 mg Per NG tube Daily JustinTeodoro, DO   200 mg at 02/06/25 0732    folic acid (FOLVITE) tablet 1 mg  1 mg Oral Daily Woody Nation DO   1 mg at 02/06/25 0732    CENTRUM/CERTA-TREMAINE with minerals oral solution 15 mL  15 mL Per G Tube Daily Woody Nation DO   15 mL at 02/06/25 0733    pantoprazole (PROTONIX) 40 mg in sodium chloride (PF) 0.9 % 10 mL injection  40 mg IntraVENous Daily Bronson Cole DO   40 mg at 02/06/25 0732    balsum peru-castor oil (VENELEX) ointment   Topical BID Bang Gonzalez MD   Given at 02/06/25 0732    hydrALAZINE (APRESOLINE) injection 5 mg  5 mg IntraVENous Q4H PRN Woody Nation DO   5 mg at 02/03/25 1558    labetalol (NORMODYNE;TRANDATE) injection 10 mg  10 mg IntraVENous Q4H PRN Rei Combs APRN - CNP   10 mg at 02/06/25 0631    oxyCODONE (ROXICODONE) immediate release tablet 5 mg  5 mg Oral Q4H PRN Karlo Grarido APRN - CNP        Or    oxyCODONE (ROXICODONE) immediate release tablet 10 mg  10 mg Oral Q4H PRN Karlo Garrido APRN - CNP   10 mg at 02/03/25 1529    morphine (PF) injection 2 mg  2 mg IntraVENous Q2H PRN Karlo Garrido APRN - CNP   2 mg at 02/04/25 1434    Or    morphine injection 4 mg  4 mg IntraVENous Q2H PRN Karlo Garrido, APRN - CNP        [Held by provider] heparin (porcine) injection 5,000 Units  5,000 Units SubCUTAneous 3 times per day Ingrid Guaman MD   5,000 Units at 02/04/25 2131    sodium chloride flush 0.9 % injection 
Prophylaxis  SCDs bilateral Lower Extremities   H   General Care Issues  Glucose: Goal glucose 110-180 mg/dL.    Sodium:  Maintain in normal range - currently Na 153 - would allow sodium to drift down, do not actively correct d/t cerebellar edema  Magnesium: Maintain > 1.8 mg/dL.  Heme: Keep Plts >= 100K, Keep INR <= 1.4  Nutrition: NG tube in place - okay to start TF's - minimize free water for now  PT/OT/SLP & PMR consult once extubated    CHANTAL Lind - CNP   NEUROCRITICAL CARE  1/27/2025 10:01 AM  [facility]: [facility] NEUROCRITICAL CARE  [phone]  HISTORY   Interval history:  As above    PMH Past Medical History:   Diagnosis Date    Cancer (HCC)     MDS    Clostridium difficile diarrhea 06/28/2017    History of blood transfusion     Hyperlipidemia     Pneumonia 2012    Thrombocytopenia (HCC)       Allergies No Known Allergies   Diet Diet NPO   Isolation No active isolations     CURRENT SCHEDULED MEDICATIONS   Inpatient Medications     vancomycin HCl in Dextrose, 1,250 mg, IntraVENous, Q12H    [START ON 1/28/2025] pantoprazole (PROTONIX) 40 mg in sodium chloride (PF) 0.9 % 10 mL injection, 40 mg, IntraVENous, Daily    senna, 1 tablet, Oral, Nightly    balsum peru-castor oil, , Topical, BID    carvedilol, 12.5 mg, Per NG tube, BID    heparin (porcine), 5,000 Units, SubCUTAneous, 3 times per day    sodium chloride flush, 5-40 mL, IntraVENous, 2 times per day   Infusions    propofol Stopped (01/24/25 1028)    niCARdipine 2.5 mg/hr (01/27/25 0957)    sodium chloride        Antibiotics   Recent Abx Admin                     vancomycin (VANCOCIN) 1250 mg in D5W 250 mL IVPB (mg) 1,250 mg New Bag 01/27/25 0936    vancomycin (VANCOCIN) 1000 mg in 200 mL IVPB (mg) 1,000 mg New Bag 01/26/25 2128                      LABS   Metabolic Panel Recent Labs     01/25/25  0533 01/26/25  0422 01/26/25  1835 01/27/25  0545 01/27/25  0758   * 147* 149* 153*  --    K 3.6 3.0* 2.6* see below 3.4*    110 117* 115*  
Prophylaxis  SCDs bilateral Lower Extremities   H   General Care Issues  Glucose: Goal glucose 110-180 mg/dL.    Sodium:  Maintain in normal range - currently Na 153 - would allow sodium to drift down, do not actively correct d/t cerebellar edema  Magnesium: Maintain > 1.8 mg/dL.  Heme: Keep Plts >= 100K, Keep INR <= 1.4  Nutrition: NG tube in place - okay to start TF's - minimize free water for now  PT/OT/SLP & PMR consult once extubated    Kunal Martinez, CHANTAL - CNP   NEUROCRITICAL CARE  1/28/2025 9:40 AM  [facility]: [facility] NEUROCRITICAL CARE  [phone]  HISTORY   Interval history:  As above    PMH Past Medical History:   Diagnosis Date    Cancer (HCC)     MDS    Clostridium difficile diarrhea 06/28/2017    History of blood transfusion     Hyperlipidemia     Pneumonia 2012    Thrombocytopenia (HCC)       Allergies No Known Allergies   Diet Diet NPO  ADULT TUBE FEEDING VOLUME BASED; Nasogastric; Peptide Based; 30; Continuous; 1,200; 24   Isolation No active isolations     CURRENT SCHEDULED MEDICATIONS   Inpatient Medications     ALTEplase (ACTIVASE) 1 mg/3 mL PF in NS (NEURO) syringe, 1 mg, Intraventricular, Once    cefTRIAXone (ROCEPHIN) IV, 1,000 mg, IntraVENous, Q24H    pantoprazole (PROTONIX) 40 mg in sodium chloride (PF) 0.9 % 10 mL injection, 40 mg, IntraVENous, Daily    senna, 1 tablet, Oral, Nightly    enalaprilat, 1.25 mg, IntraVENous, 4 times per day    balsum peru-castor oil, , Topical, BID    carvedilol, 12.5 mg, Per NG tube, BID    heparin (porcine), 5,000 Units, SubCUTAneous, 3 times per day    sodium chloride flush, 5-40 mL, IntraVENous, 2 times per day   Infusions    niCARdipine Stopped (01/27/25 1402)    sodium chloride        Antibiotics   Recent Abx Admin                     vancomycin (VANCOCIN) 1250 mg in D5W 250 mL IVPB (mg) 1,250 mg New Bag 01/28/25 0855     1,250 mg New Bag 01/27/25 2127                      LABS   Metabolic Panel Recent Labs     01/26/25  0422 01/26/25  2819 
sedation  -Hemodynamic management  -SBP <= 160 mmHg  -PRN labetolol and hydralazine  -Appreciate ICU team assistance with BP management  -Heparin 5000 u q8h for DVT ppx  -General Care Issues  -Glucose: Goal glucose 110-180.    -Magnesium: Maintain > 1.8  -Keep Plts >= 100K, Keep INR <= 1.4  -Nutrition: NG tube in place - okay for free water flushes based on free water deficit    Family updated by myself and Dr. Kay today    CHANTAL North - CNP   NEUROCRITICAL CARE  2/5/2025 8:08 AM  [facility]: [facility] NEUROCRITICAL CARE  [phone]  HISTORY   Interval hx:  As above     PMH Past Medical History:   Diagnosis Date    Cancer (HCC)     MDS    Clostridium difficile diarrhea 06/28/2017    History of blood transfusion     Hyperlipidemia     Pneumonia 2012    Thrombocytopenia (HCC)       Allergies No Known Allergies   Diet Diet NPO  ADULT TUBE FEEDING VOLUME BASED; Nasogastric; Peptide Based; 30; Continuous; 1,200; 24   Isolation No active isolations     CURRENT SCHEDULED MEDICATIONS   Inpatient Medications     ipratropium 0.5 mg-albuterol 2.5 mg, 1 Dose, Inhalation, Q4H WA RT    sodium chloride (Inhalant), 4 mL, Nebulization, BID    amLODIPine, 5 mg, Oral, Nightly    lisinopril, 40 mg, Per NG tube, Daily    senna, 2 tablet, Oral, BID    polyethylene glycol, 17 g, Oral, BID    modafinil, 200 mg, Per NG tube, Daily    folic acid, 1 mg, Oral, Daily    CENTRUM/CERTA-TREMAINE with minerals oral, 15 mL, Per G Tube, Daily    pantoprazole (PROTONIX) 40 mg in sodium chloride (PF) 0.9 % 10 mL injection, 40 mg, IntraVENous, Daily    balsum peru-castor oil, , Topical, BID    heparin (porcine), 5,000 Units, SubCUTAneous, 3 times per day    sodium chloride flush, 5-40 mL, IntraVENous, 2 times per day   Infusions    dextrose      sodium chloride                LABS   Metabolic Panel Recent Labs     02/03/25  0503 02/04/25  0505 02/05/25  0528    140 142   K 3.6 3.9 3.9    107 107   CO2 28 24 27   BUN 23* 19 16 
significant for: Na 134  EKG NSR.  Imaging,  CT Head w/o showing Large posterior fossa intraparenchymal hematoma with intraventricular extension. Severe mass effect upon the brainstem and upon the fourth ventricle.  CTA Head Neck w/ No abnormal vascular lesion in the posterior fossa. No flow significant stenosis in the head or neck. Non flow-limiting atherosclerotic disease in the bilateral carotid bulbs.   XR Chest No acute disease.  While in the ED, she received labetolol, cardene gtt, desmopressin and Neurosurgery was consulted for further management.     Patient was examined earlier today in the emergency department. Denies HS and LOC with fall. She endorses headache, denies change in vision. She denies neck pain and back pain. She denies numbness and tingling in all extremities. She endorses generalized weakness. She is no longer nauseous. NPO for OR. She was having regular BM and voiding spontaneously before arrival. She denies fever and chills. Denies chest pain and shortness of breath.    Medications:     Scheduled Meds:   carvedilol  6.25 mg Per NG tube BID    lansoprazole  30 mg Per NG tube Daily    sodium chloride flush  5-40 mL IntraVENous 2 times per day    ceFAZolin  2,000 mg IntraVENous Q8H     Continuous Infusions:   niCARdipine      sodium chloride      sodium chloride      [Held by provider] propofol Stopped (01/23/25 1240)     PRN Meds:sodium chloride flush, sodium chloride, acetaminophen **OR** acetaminophen, ondansetron **OR** ondansetron    Objective:   Vitals:   T-max:  Patient Vitals for the past 8 hrs:   BP Temp Temp src Pulse Resp SpO2   01/23/25 0905 -- -- -- 84 17 100 %   01/23/25 0900 114/60 -- -- -- 15 --   01/23/25 0800 112/60 98.8 °F (37.1 °C) Bladder 84 17 100 %   01/23/25 0700 (!) 108/57 -- -- 84 18 100 %   01/23/25 0645 -- -- -- 85 20 100 %   01/23/25 0630 -- -- -- 82 17 100 %   01/23/25 0615 -- -- -- 85 17 100 %   01/23/25 0600 118/61 99 °F (37.2 °C) Bladder 84 18 100 %   01/23/25 
water flushes based on free water deficit    Kunal Martinez, CHANTAL - CNP   NEUROCRITICAL CARE  2/4/2025 10:27 AM  [facility]: [facility] NEUROCRITICAL CARE  [phone]  HISTORY   Interval hx:  As above     PMH Past Medical History:   Diagnosis Date    Cancer (HCC)     MDS    Clostridium difficile diarrhea 06/28/2017    History of blood transfusion     Hyperlipidemia     Pneumonia 2012    Thrombocytopenia (HCC)       Allergies No Known Allergies   Diet Diet NPO  ADULT TUBE FEEDING VOLUME BASED; Nasogastric; Peptide Based; 60; Continuous; 1,200; 24   Isolation No active isolations     CURRENT SCHEDULED MEDICATIONS   Inpatient Medications     ipratropium 0.5 mg-albuterol 2.5 mg, 1 Dose, Inhalation, Q4H WA RT    sodium chloride (Inhalant), 4 mL, Nebulization, BID    lisinopril, 40 mg, Per NG tube, Daily    senna, 2 tablet, Oral, BID    polyethylene glycol, 17 g, Oral, BID    modafinil, 200 mg, Per NG tube, Daily    folic acid, 1 mg, Oral, Daily    CENTRUM/CERTA-TREMAINE with minerals oral, 15 mL, Per G Tube, Daily    pantoprazole (PROTONIX) 40 mg in sodium chloride (PF) 0.9 % 10 mL injection, 40 mg, IntraVENous, Daily    balsum peru-castor oil, , Topical, BID    heparin (porcine), 5,000 Units, SubCUTAneous, 3 times per day    sodium chloride flush, 5-40 mL, IntraVENous, 2 times per day   Infusions    dextrose      sodium chloride                LABS   Metabolic Panel Recent Labs     02/02/25  0432 02/03/25  0503 02/04/25  0505    142 140   K 3.8 3.6 3.9    107 107   CO2 28 28 24   BUN 23* 23* 19   CREATININE 0.5* 0.5* 0.5*   GLUCOSE 129* 116* 132*   CALCIUM 8.7 8.8 8.6   PHOS 2.5 2.7 2.7   MG 2.19 2.31 2.28      CBC / Coags Recent Labs     02/02/25  0432 02/03/25  0503 02/04/25  0505   WBC 12.7* 10.7 15.2*   RBC 2.78* 2.77* 2.93*   HGB 9.7* 9.7* 10.2*   HCT 29.1* 28.9* 30.5*    275 289            DIAGNOSTICS   IMAGES:    No new images to review from lat 24 hours    Head CT: 2/3/25 5:52 AM  Reidentified 
  GLUCOSE 132* 121* 112*   CALCIUM 8.6 8.9 9.3   PHOS 2.7 2.8 2.6   MG 2.28 2.33 2.35      CBC / Coags Recent Labs     02/04/25  0505 02/05/25  0528 02/06/25  0337   WBC 15.2* 13.6* 13.9*   RBC 2.93* 2.71* 2.68*   HGB 10.2* 9.5* 9.4*   HCT 30.5* 28.2* 28.0*    314 296   INR  --  1.12  --             DIAGNOSTICS   IMAGES:  Following images reviewed:    F/u head CT 2/6/25 - reviewed - stable ventricular size    MRI cervical spine without contrast: 2/4/25  Postoperative changes of posterior C1 arch decompression. A large suboccipital fluid collection is contiguous with the posterior C1 arch decompression site and supraspinous region from C2 to C4. This may represent a postoperative seroma or   pseudomeningocele.     Multilevel cervical spondylosis as detailed.    MRI brain with and without contrast: 2/4/25  No significant change in extensive, now late subacute, intraparenchymal hemorrhage in the cerebellum causing effacement of the fourth ventricle. There is slight progression in mild hydrocephalus. Transependymal edema.     Stable subdural, intraventricular, and subarachnoid hemorrhage.     Postoperative changes of suboccipital decompression. There is residual crowding within the posterior fossa with flattening of the brainstem and inferior cerebellar tonsillar positioning.     Suboccipital scalp collection is mildly complex but has a CSF-like signal which may represent seroma versus pseudomeningocele.     PHYSICAL EXAMINATION     PHYSICAL EXAM:  Vitals:    02/06/25 0834 02/06/25 0835 02/06/25 0900 02/06/25 1000   BP:   (!) 140/69 134/67   Pulse: 87 86 88 88   Resp: 19 17 19 19   Temp:       TempSrc:       SpO2: 100% 100% 97% 100%   Weight:       Height:           Exam  No sedation  No eye opening  Not verbalizing or gesturing   Pupils are 3mm bilaterally, brisk and round  Primary gaze is slightly dysconjugate   No mouthing words or gesturing  She is initiating breaths on the ventilator   No movement or 
(porcine)  5,000 Units SubCUTAneous 3 times per day    sodium chloride flush  5-40 mL IntraVENous 2 times per day      Infusions:    dextrose      sodium chloride       PRN Meds: glucose, 4 tablet, PRN  dextrose bolus, 125 mL, PRN   Or  dextrose bolus, 250 mL, PRN  glucagon (rDNA), 1 mg, PRN  dextrose, , Continuous PRN  hydrALAZINE, 5 mg, Q4H PRN  labetalol, 10 mg, Q4H PRN  oxyCODONE, 5 mg, Q4H PRN   Or  oxyCODONE, 10 mg, Q4H PRN  morphine, 2 mg, Q2H PRN   Or  morphine, 4 mg, Q2H PRN  sodium chloride flush, 5-40 mL, PRN  sodium chloride, , PRN  acetaminophen, 650 mg, Q6H PRN   Or  acetaminophen, 650 mg, Q6H PRN  ondansetron, 4 mg, Q8H PRN   Or  ondansetron, 4 mg, Q6H PRN        Labs and Imaging   MRI BRAIN WO CONTRAST    Result Date: 2/4/2025  Exam:MRI BRAIN WO CONTRAST, MRI CERVICAL SPINE WO CONTRAST Date:2/4/2025 12:51 EST Indication: Altered mental status, fluid collection Comparison: 2/3/2025, 1/26/2025 Technique: Multiplanar multisequence MR images obtained of the head and cervical spine. Contrast:  None FINDINGS: HEAD: Brain: Right frontal approach ventricular drain terminates in the region of the body of the left lateral ventricle. There is hemorrhage and vasogenic edema along the catheter tract in the right frontal lobe. Stable mild dependent hemorrhage in the atria and occipital horns of the lateral ventricles. Areas of subdural hemorrhage, likely late subacute, along the right cerebral hemisphere and right tentorium appear unchanged. Large intraparenchymal hematoma centered within the cerebellum, asymmetric to the  left is again noted, now with internal hyperintense T1 and T2 signal compatible with late subacute hemorrhage. Trace subdural hemorrhage along the posterior fossa bilaterally. There is effacement of the fourth ventricle with progressive mild hydrocephalus and transependymal edema. There are postoperative changes of suboccipital decompression. There is persistent compression of the brainstem, the 
12.9*   RBC 3.48* 3.21* 3.23*   HGB 12.1 11.1* 11.1*   HCT 35.4* 32.6* 32.9*    201 239      Other No results for input(s): \"LABA1C\", \"CHOL\", \"HDL\", \"LDL\", \"TRIG\", \"TSH\", \"OAGNUUNL99\", \"FOLATE\", \"LABSALI\", \"COVID19\" in the last 72 hours.    No results for input(s): \"PHENYTOIN\", \"LEVETIRACETA\", \"LACOSA\", \"LAMO\", \"VALPROATE\", \"LACTSEPSIS\", \"LACTA\" in the last 72 hours.     DIAGNOSTICS   IMAGES:    No new neuro imaging to review from last 24 hours    CT head without contrast: 1/22/25  1. Posterior fossa craniotomy with partial evacuation of hematoma with associated pneumocephalus, slight interval decrease of mass effect.     2. There is a right frontal ventriculostomy catheter projecting into the left lateral ventricle with interval development of intraventricular hemorrhage in the left lateral ventricle, as well as extra-axial acute fluid collections over the right frontal   region, subarachnoid hemorrhage, and extra-axial bleeding along the falx without midline shift. Interval decrease of hydrocephalus.    CTA head and neck with contrast: 1/22/25  1. No abnormal vascular lesion in the posterior fossa.  2. No flow significant stenosis in the head or neck.  3. Nonflow-limiting atherosclerotic disease in the bilateral carotid bulbs.    CT head without contrast: 1/22/25  1. Large posterior fossa intraparenchymal hematoma with intraventricular  extension. Severe mass effect upon the brainstem and upon the fourth ventricle.    PHYSICAL EXAMINATION     PHYSICAL EXAM:  Vitals:    01/26/25 0945 01/26/25 1000 01/26/25 1015 01/26/25 1030   BP: (!) 142/69 (!) 155/73 126/67 (!) 148/70   Pulse: 77 78 80 77   Resp: 16 15 15 16   Temp:       TempSrc:       SpO2: 100% 100% 100% 100%   Weight:       Height:           General: Intubated  Neurologic  Mental status/exam:   Does not fully open eye, but will attempt to open  Pupils are 3mm bilaterally, brisk and round  Follows at 4 at times, most consistently w/ LUE/LLE  W/d to pain 
153* 153* 151*   K 3.6 3.2* 3.5   * 114* 111*   CO2 31 29 31   BUN 27* 24* 27*   CREATININE 0.7 0.6 0.6   GLUCOSE 134* 163* 145*   CALCIUM 10.0 9.5 9.4   PHOS 3.0 2.5 2.7   MG 2.34 2.23 2.26      CBC / Coags Recent Labs     01/29/25  0509 01/30/25  0556 01/31/25  0526   WBC 10.0 10.0 11.4*   RBC 3.34* 3.21* 3.08*   HGB 11.7* 11.2* 10.7*   HCT 34.9* 33.7* 32.2*    276 287      Other Recent Labs     01/29/25  1401   [id number]   FOLATE 6.96         No results for input(s): \"PHENYTOIN\", \"LEVETIRACETA\", \"LACOSA\", \"LAMO\", \"VALPROATE\", \"LACTSEPSIS\", \"LACTA\" in the last 72 hours.     DIAGNOSTICS   IMAGES:    No new imaging to review from last 24 hours      PHYSICAL EXAMINATION     PHYSICAL EXAM:  Vitals:    01/31/25 0806 01/31/25 0815 01/31/25 0830 01/31/25 0900   BP:  (!) 164/65 (!) 147/67 (!) 137/59   Pulse: 86 59 81 84   Resp:  20 20 20   Temp:       TempSrc:       SpO2:  100% 100% 100%   Weight:       Height:           General: Intubated, calm  Neurologic  Mental status/exam:   No sedation  Slowly opens eyes to pain, does not maintain eye opening, but if eyes held open will track examiner  Does not attempt to speak around ETT  Pupils are 3mm bilaterally, brisk and round  Primary gaze is slightly dysconjugate   Did not follow commands today  No movement in the RUE  LUE with flexion to painful stimulus  Withdraws lower extremities to pain  She is initiating breaths on the ventilator   EVD drain site is clean and intact with occlusive dressing    Critical Care:  Due to the immediate potential for life-threatening deterioration due to neurological failure, I spent 35 minutes providing critical care.  This time excludes time spent performing procedures but includes time spent on direct patient care, history retrieval, review of the chart, and discussions with patient, family, and consultant(s).    
40 mg IntraVENous Daily    balsum peru-castor oil   Topical BID    heparin (porcine)  5,000 Units SubCUTAneous 3 times per day    sodium chloride flush  5-40 mL IntraVENous 2 times per day      Infusions:    dextrose      sodium chloride       PRN Meds: glucose, 4 tablet, PRN  dextrose bolus, 125 mL, PRN   Or  dextrose bolus, 250 mL, PRN  glucagon (rDNA), 1 mg, PRN  dextrose, , Continuous PRN  hydrALAZINE, 5 mg, Q4H PRN  labetalol, 10 mg, Q4H PRN  oxyCODONE, 5 mg, Q4H PRN   Or  oxyCODONE, 10 mg, Q4H PRN  morphine, 2 mg, Q2H PRN   Or  morphine, 4 mg, Q2H PRN  sodium chloride flush, 5-40 mL, PRN  sodium chloride, , PRN  acetaminophen, 650 mg, Q6H PRN   Or  acetaminophen, 650 mg, Q6H PRN  ondansetron, 4 mg, Q8H PRN   Or  ondansetron, 4 mg, Q6H PRN        Labs and Imaging   MRI BRAIN WO CONTRAST    Result Date: 2/4/2025  Exam:MRI BRAIN WO CONTRAST, MRI CERVICAL SPINE WO CONTRAST Date:2/4/2025 12:51 EST Indication: Altered mental status, fluid collection Comparison: 2/3/2025, 1/26/2025 Technique: Multiplanar multisequence MR images obtained of the head and cervical spine. Contrast:  None FINDINGS: HEAD: Brain: Right frontal approach ventricular drain terminates in the region of the body of the left lateral ventricle. There is hemorrhage and vasogenic edema along the catheter tract in the right frontal lobe. Stable mild dependent hemorrhage in the atria and occipital horns of the lateral ventricles. Areas of subdural hemorrhage, likely late subacute, along the right cerebral hemisphere and right tentorium appear unchanged. Large intraparenchymal hematoma centered within the cerebellum, asymmetric to the  left is again noted, now with internal hyperintense T1 and T2 signal compatible with late subacute hemorrhage. Trace subdural hemorrhage along the posterior fossa bilaterally. There is effacement of the fourth ventricle with progressive mild hydrocephalus and transependymal edema. There are postoperative changes of 
6:56 AM      
Labs     25  0545 25  0535 25  0509   WBC 12.2* 12.2* 10.0   RBC 3.10* 3.22* 3.34*   HGB 11.0* 11.3* 11.7*   HCT 31.9* 33.2* 34.9*    289 274      Other No results for input(s): \"LABA1C\", \"CHOL\", \"HDL\", \"LDL\", \"TRIG\", \"TSH\", \"[id number]\", \"FOLATE\", \"LABSALI\", \"COVID19\" in the last 72 hours.      No results for input(s): \"PHENYTOIN\", \"LEVETIRACETA\", \"LACOSA\", \"LAMO\", \"VALPROATE\", \"LACTSEPSIS\", \"LACTA\" in the last 72 hours.     DIAGNOSTICS   IMAGES:    cvEE   During this day of recording no events were recorded. The interictal EEG was abnormal due to abundant, generalized blunt sharp wave  discharges with triphasic waves suggestive of generalized cortical irritability. The triphasic wave sharp wave discharges with  diffuse delta slowing admixed with rare theta frequencies suggestive of severe encephalopathy. Monitoring was continued in order to record the patient's typical events. The EKG channel revealed no abnormalities.     CT head without contrast: 25  1. Persistent intraventricular hemorrhage and right frontal ventriculostomy catheter with hemorrhage along the tract and subdural regions, slight increase in intraventricular blood pooling or layering in the occipital horns.  2. Persistent extensive suboccipital craniotomy changes and hemorrhage throughout the midline cerebellum and left cerebral hemisphere with low density following resection of the mass  3. Small amount of subarachnoid hemorrhage along the temporal and temporal parietal regions and bilateral frontal regions, similar compared to prior study.  4. Persistent ventricular dilation.    MRI brain with and without contrast: 25  1. No evidence for intracranial neoplasm or arteriovenous malformation  2. Mildly increased size of the lateral and third ventricles with persistent posterior fossa mass effect effacing the fourth ventricle compatible with progression of obstructive hydrocephalus since placement of the 
intracranial hemorrhage seen on CT Head. Patient s/p RIGHT FRONTAL EXTRAVENTRICULAR DRAIN/ SUBOCCIPITAL DECOMPRESSION AND HEMATOMA EVACUATION by Dr. Kay on 01/22/2025.     Majority of hematoma was debulked. Persistent cerebellar edema with some brainstem compression. MRI brain from 2/4 without any notable brainstem ischemic changes. Exam at times has been reassuring for signs of alertness, responsiveness, and simple command following.     Patient's mental status has worsened since yesterday, and even after removing 10 mL of CSF during today's assessment there was no change in patient's exam.    Plan:  Intracranial hemorrhage (HCC):  - Neurocritical Care following, appreciate assistance  - Keep External Ventricular Drain Clamped  - PRN Tylenol, Roxicodone and IV Morphine for pain  - Incisional Care: Open to air. Wash incision daily with warm soapy water or shower daily, and pat dry with clean dry towel. Paint with CHG swab.  - Dr. Kay to talk with family this afternoon.  DVT Prophylaxis: SCD's & SQ Heparin   Will follow inpatient.  Please call with any questions or decline in neurological status    DISPO: Remain inpatient from neurosurgery standpoint. Dispo timing to be determined by primary team once patient is medically stable for discharge.    Patient was discussed with Dr. Kay who agrees with assessment and plan    Electronically signed by: CHANTAL Pimentel - CNP, 2/6/2025 10:48 AM      
standpoint. Dispo timing to be determined by primary team once patient is medically stable for discharge.    Plan Discussed with Dr. Kay and he agrees with plan    Electronically signed by: CHANTAL Pimentel CNP, 2/3/2025 10:22 AM      
CONTRAST    Result Date: 1/22/2025  CT angiography of the head and neck with contrast HISTORY: Stroke COMPARISON: Head CT January 22, 2025. TECHNIQUE: Multidetector CT imaging of the head and neck was performed following administration of intravenous contrast material in the arterial phase. 3-D MPR and MIP images were retrospectively generated at a separate workstation. Individualized dose optimization technique was used in order to meet ALARA standards for radiation dose reduction.  In addition to vendor specific dose reduction algorithms, the dose reduction techniques vary based on the specific scanner utilized but frequently include automated exposure control, adjustment of the mA and/or kV according to patient size, and use of iterative reconstruction technique. FINDINGS: There is standard aortic arch anatomy. The imaged subclavian arteries are patent. Calcifications are present in the right carotid bulb and extending into the proximal right internal carotid artery but there is no flow significant stenosis.. Posterior communicating artery unremarkable. The anterior cerebral arteries are patent. There is an anterior communicating artery which is unremarkable. The middle cerebral arteries are patent. Calcifications are present in the left carotid bulb and extending into the proximal left internal carotid artery but there is no flow significant stenosis. The origin of the left vertebral artery is normal. The cervical course of the left vertebral artery is unremarkable. The intradural segment of the left vertebral artery is patent. The origin of the right vertebral artery is normal. The cervical course of the right vertebral artery is unremarkable. The intradural segment of the right vertebral artery is patent. The basilar artery is patent. The posterior cerebral arteries are patent. No findings of an abnormal vascular lesion particularly in the posterior fossa at the site of large intraparenchymal hemorrhage. The 
removed and replaced by Dr. Kay on 1/27/2025.    Plan:  Intracranial hemorrhage (HCC):  - Neurocritical Care following, appreciate assistance  - External Ventricular Drain Open at 5 mm Hg  - PRN Tylenol, Roxicodone and IV Morphine for pain  DVT Prophylaxis: SCD's & SQ Heparin   Will follow inpatient.  Please call with any questions or decline in neurological status    DISPO: Remain inpatient from neurosurgery standpoint. Dispo timing to be determined by primary team once patient is medically stable for discharge.    Patient was discussed with Dr. Kay who agrees with above assessment and plan.     Electronically signed by: CHANTAL Pmientel - CNP, 1/28/2025 11:25 AM  [phone]  
Ciaran Lea    XR CHEST PORTABLE    Result Date: 1/22/2025  1 view chest HISTORY: Intracranial hemorrhage. COMPARISON: Chest radiograph every 6/20/2020. FINDINGS: The cardiomediastinal contours are normal.  The lungs are clear.  No pleural abnormality is evident.     No acute disease Electronically signed by Ciaran Lea    CT HEAD WO CONTRAST    Result Date: 1/22/2025  HISTORY: Stroke symptoms. Altered mental status. EXAM : CT OF THE HEAD WITHOUT CONTRAST TECHNIQUE: Multiple axial images were obtained of the brain without the use of contrast. Individualized dose optimization technique was used in order to meet ALARA standards for radiation dose reduction.  In addition to vendor specific dose reduction algorithms, the dose reduction techniques vary based on the specific scanner utilized but frequently include automated exposure control, adjustment of the mA and/or kV according to patient size, and use of iterative reconstruction technique. COMPARISON: 1/8/2018. FINDINGS: The visualized paranasal sinuses, mastoid air cells and middle ears are clear to the extent visualized. The orbits and osseous structures are unremarkable. Intracranially, there is a large posterior fossa parenchymal hematoma which appears to arise from the left cerebellar hemisphere. This results in severe mass effect upon the fourth ventricle and extension into the fourth ventricle. There is evidence of mass effect upon the medulla. The hematoma measures 3.5 cm in craniocaudal dimension, 3.6 cm in anterior posterior dimension, and 5.4 cm in lateral dimension. No evidence for hydrocephalus. No extension of the hemorrhage above the tentorium.     1. Large posterior fossa intraparenchymal hematoma with intraventricular extension. Severe mass effect upon the brainstem and upon the fourth ventricle. Critical results called to Dr. Montejo at 11:19 a.m. Electronically signed by Uli Matson      CBC:   Recent Labs     01/23/25  0413 
[id number]                          ORDERED BY: ELLE ALMEIDA  SOURCE: Sputum Suctioned                   COLLECTED:  01/31/25 22:40  ANTIBIOTICS AT DAVID.:                      RECEIVED :  02/01/25 06:30    Culture, Respiratory [[id number]]     Order Status: Canceled Specimen: Sputum, Suctioned     Culture, CSF (with Gram Stain) [[id number]] Collected: 01/28/25 1058    Order Status: Completed Specimen: CSF Updated: 02/04/25 1423     CSF Culture No growth after 7 days of incubation.     Gram Stain Result Cytospin performed,no quantitation  RBC's  WBC's (Polymorphonuclear)  No organisms seen      Narrative:      ORDER#: [id number]                          ORDERED BY: JUAN WHITE  SOURCE: CSF (Spinal Fluid) EVD             COLLECTED:  01/28/25 10:58  ANTIBIOTICS AT DAVID.:                      RECEIVED :  01/28/25 17:47    Meningitis/Encephalitis Panel, CSF [[id number]] Collected: 01/28/25 1058    Order Status: Completed Specimen: CSF Updated: 01/28/25 2004     Meningitis Encephalitis Panel --     Meningitis Encephalitis Panel PCR Result: Not Detected  Patients with a suspicion of cryptococcal meningitis should be tested  for cryptococcal antigen.\"  See additional report for complete Meningitis Encephalitis Panel.      Narrative:      ORDER#: [id number]                          ORDERED BY: JUAN WHITE  SOURCE: CSF (Spinal Fluid) EVD             COLLECTED:  01/28/25 10:58  ANTIBIOTICS AT DAVID.:                      RECEIVED :  01/28/25 18:26    Meningitis Encephalitis Panel Report [[id number]] Collected: 01/28/25 1058    Order Status: Completed Updated: 01/28/25 2006     Report SEE IMAGE    MRSA DNA Probe, Nasal [[id number]] Collected: 01/25/25 1113    Order Status: Completed Specimen: Nares Updated: 01/26/25 0157     MRSA SCREEN RT-PCR --     Negative  MRSA DNA not detected.  Normal Range: Not detected      Narrative:      ORDER#: [id number]                          ORDERED BY: JOLEEN CUENCA  SOURCE: Nares 
unremarkable. The intradural segment of the left vertebral artery is patent. The origin of the right vertebral artery is normal. The cervical course of the right vertebral artery is unremarkable. The intradural segment of the right vertebral artery is patent. The basilar artery is patent. The posterior cerebral arteries are patent. No findings of an abnormal vascular lesion particularly in the posterior fossa at the site of large intraparenchymal hemorrhage. The thyroid, submandibular, and parotid glands are normal. The structures of the aerodigestive tract are intact. No lymphadenopathy is visualized. The upper hemithorax is unremarkable. The  spaces are normal. The globes and orbits are intact. The paranasal sinuses and mastoid air cells are clear. No destructive osseous lesion is identified.     1. No abnormal vascular lesion in the posterior fossa. 2. No flow significant stenosis in the head or neck. 3. Nonflow-limiting atherosclerotic disease in the bilateral carotid bulbs. Electronically signed by Tempolib    XR CHEST PORTABLE    Result Date: 1/22/2025  1 view chest HISTORY: Intracranial hemorrhage. COMPARISON: Chest radiograph every 6/20/2020. FINDINGS: The cardiomediastinal contours are normal.  The lungs are clear.  No pleural abnormality is evident.     No acute disease Electronically signed by Tempolib    CT HEAD WO CONTRAST    Result Date: 1/22/2025  HISTORY: Stroke symptoms. Altered mental status. EXAM : CT OF THE HEAD WITHOUT CONTRAST TECHNIQUE: Multiple axial images were obtained of the brain without the use of contrast. Individualized dose optimization technique was used in order to meet ALARA standards for radiation dose reduction.  In addition to vendor specific dose reduction algorithms, the dose reduction techniques vary based on the specific scanner utilized but frequently include automated exposure control, adjustment of the mA and/or kV according to patient size, 
CONTRAST    (Results Pending)         Assessment/Plan:   79 y.o. female who presented to the ED on 1/22/2025 with headache, nausea, vomiting, ground level fall     #Large posterior fossa intraparenchymal hematoma with intraventricular extension  #Severe mass effect on brainstem and fourth ventricle.  Patient last known well 1/22 around 0900. She developed sudden onset headache, nausea, and vomiting before experiencing a ground level fall - not traumatic brain bleed. Unknown etiology, could be hypertensive in nature as SBP 220s on arrival to ED.  -1/22/25 CT Head w/o showing Large posterior fossa intraparenchymal hematoma with intraventricular extension. Severe mass effect upon the brainstem and upon the fourth ventricle.  -1/22/25 Post-op CT Head w/o Posterior fossa craniotomy with partial evacuation of hematoma with associated pneumocephalus, slight interval decrease of mass effect. There is a right frontal ventriculostomy catheter projecting into the left lateral ventricle with interval development of intraventricular hemorrhage in the left lateral ventricle, as well as extra-axial acute fluid collections over the right frontal region, subarachnoid hemorrhage, and extra-axial bleeding along the falx without midline shift. Interval decrease of hydrocephalus.  -Now POD2 s/p right frontal extraventricular drain/ suboccipital decompression and hematoma evacuation, C1 laminectomy w/ perioperative ancef  -Currently intubated but use RSI (etomidate and rocc) she requires re-intubation after being extubated.  -NSGY consulted, recommendations   EVD open at 5mmHg  SBP goal <160, on cardene gtt 5, coreg 6.25 mg x2 daily added for predicted increase in blood pressure s/p extubation when off sedation              PLT >100k and INR <1.4              HOB >30 degrees              Na >135              No dextrose in IVF or in IV drips              No decadron  Ok for SCD and SQH - ordered  IV Robaxin Q8H x3 days and PRN Tylenol, 
WBC's (Polymorphonuclear)  1+ Epithelial Cells  No organisms seen      Narrative:      ORDER#: [id number]                          ORDERED BY: ELLE ALMEIDA  SOURCE: Sputum Suctioned                   COLLECTED:  01/31/25 22:40  ANTIBIOTICS AT DAVID.:                      RECEIVED :  02/01/25 06:30    Culture, Respiratory [[id number]]     Order Status: Canceled Specimen: Sputum, Suctioned     Culture, CSF (with Gram Stain) [[id number]] Collected: 01/28/25 1058    Order Status: Completed Specimen: CSF Updated: 02/04/25 1423     CSF Culture No growth after 7 days of incubation.     Gram Stain Result Cytospin performed,no quantitation  RBC's  WBC's (Polymorphonuclear)  No organisms seen      Narrative:      ORDER#: [id number]                          ORDERED BY: JUAN WHITE  SOURCE: CSF (Spinal Fluid) EVD             COLLECTED:  01/28/25 10:58  ANTIBIOTICS AT DAVID.:                      RECEIVED :  01/28/25 17:47    Meningitis/Encephalitis Panel, CSF [[id number]] Collected: 01/28/25 1058    Order Status: Completed Specimen: CSF Updated: 01/28/25 2004     Meningitis Encephalitis Panel --     Meningitis Encephalitis Panel PCR Result: Not Detected  Patients with a suspicion of cryptococcal meningitis should be tested  for cryptococcal antigen.\"  See additional report for complete Meningitis Encephalitis Panel.      Narrative:      ORDER#: [id number]                          ORDERED BY: JUAN WHITE  SOURCE: CSF (Spinal Fluid) EVD             COLLECTED:  01/28/25 10:58  ANTIBIOTICS AT DAVID.:                      RECEIVED :  01/28/25 18:26    Meningitis Encephalitis Panel Report [[id number]] Collected: 01/28/25 1058    Order Status: Completed Updated: 01/28/25 2006     Report SEE IMAGE           RADIOLOGY:   XR CHEST PORTABLE   Final Result      No acute cardiopulmonary findings.         Electronically signed by Ashley Schilling      CT HEAD WO CONTRAST   Final Result      Stable dilation of the third ventricle and the 
dose reduction.  In addition to vendor specific dose reduction algorithms, the dose reduction techniques vary based on the specific scanner utilized but frequently include automated exposure control, adjustment of the mA and/or kV according to patient size, and use of iterative reconstruction technique. COMPARISON: 1/8/2018. FINDINGS: The visualized paranasal sinuses, mastoid air cells and middle ears are clear to the extent visualized. The orbits and osseous structures are unremarkable. Intracranially, there is a large posterior fossa parenchymal hematoma which appears to arise from the left cerebellar hemisphere. This results in severe mass effect upon the fourth ventricle and extension into the fourth ventricle. There is evidence of mass effect upon the medulla. The hematoma measures 3.5 cm in craniocaudal dimension, 3.6 cm in anterior posterior dimension, and 5.4 cm in lateral dimension. No evidence for hydrocephalus. No extension of the hemorrhage above the tentorium.     1. Large posterior fossa intraparenchymal hematoma with intraventricular extension. Severe mass effect upon the brainstem and upon the fourth ventricle. Critical results called to Dr. Montejo at 11:19 a.m. Electronically signed by Uli Matson      CBC:   Recent Labs     02/01/25  0550 02/02/25  0432 02/03/25  0503   WBC 11.8* 12.7* 10.7   HGB 10.3* 9.7* 9.7*    268 275     BMP:    Recent Labs     02/01/25  0550 02/02/25  0432 02/03/25  0503   * 144 142   K 3.1* 3.8 3.6   * 109 107   CO2 27 28 28   BUN 24* 23* 23*   CREATININE 0.5* 0.5* 0.5*   GLUCOSE 134* 129* 116*     Hepatic:   No results for input(s): \"AST\", \"ALT\", \"BILITOT\", \"ALKPHOS\" in the last 72 hours.    Invalid input(s): \"ALB\"    Lipids:   Lab Results   Component Value Date/Time    CHOL 187 01/23/2025 04:15 AM    HDL 69 01/23/2025 04:15 AM    TRIG 206 01/26/2025 04:22 AM     Hemoglobin A1C:   Lab Results   Component Value Date/Time    LABA1C 4.6 01/23/2025 04:15 AM 
intraparenchymal hematoma with intraventricular extension  #Severe mass effect on brainstem and fourth ventricle.  Patient last known well 1/22 around 0900. She developed sudden onset headache, nausea, and vomiting before experiencing a ground level fall - not traumatic brain bleed. Unknown etiology, could be hypertensive in nature as SBP 220s on arrival to ED.  -1/22/25 CT Head w/o showing Large posterior fossa intraparenchymal hematoma with intraventricular extension. Severe mass effect upon the brainstem and upon the fourth ventricle.  -1/22/25 Post-op CT Head w/o Posterior fossa craniotomy with partial evacuation of hematoma with associated pneumocephalus, slight interval decrease of mass effect. There is a right frontal ventriculostomy catheter projecting into the left lateral ventricle with interval development of intraventricular hemorrhage in the left lateral ventricle, as well as extra-axial acute fluid collections over the right frontal region, subarachnoid hemorrhage, and extra-axial bleeding along the falx without midline shift. Interval decrease of hydrocephalus.  -1/27/25 MRI-B No evidence for intracranial neoplasm or arteriovenous malformation. Mildly increased size of the lateral and third ventricles with persistent posterior fossa mass effect effacing the fourth ventricle compatible with progression of obstructive hydrocephalus since placement of the right frontal intraventricular catheter crossing midline and terminating in the body of the left lateral ventricle. New transependymal flow of CSF is present as well. No obvious change in the amount of cerebellar parenchymal hemorrhage status post suboccipital decompression and persistent areas of right cerebral subdural hematomas, scattered subarachnoid hemorrhage and intraventricular hemorrhage. No gross evidence   for new intracranial hemorrhage.  -Now POD5 s/p right frontal extraventricular drain/ suboccipital decompression and hematoma evacuation, 
1800    #Hypocalcemia  -Ca WNL 9.3   -F/u on daily labs    #Hypomagnesemia   -Mg WNL 2.31   -F/u on daily labs     Chronic Conditions:     #History MDS  -S/p remote history of bone marrow transplant     Glycemic goal:  140-180 mg/dL  LDAs:  PIV, a-line, urinary catheter, EVD  Infusions: Cardene  Abx: Vancomycin  Diet:  Diet NPO  GI PPx:  Pepcid 20 mg IV bid  Bowel Regimen:  NA  DVT PPx: SCDs, SQH today        Code Status:  Prior  Disposition:   Prior to admission:  From Home  Current:  ICU  At discharge:  [facility]    Bronson Cole DO, PGY-1  01/26/25  9:16 AM    Patient examined, findings as discussed with Dr. Cole.  Agree with assessment and plan.  POD #3 after evacuation of posterior fossa bleed.  Required reintubation on POD #1 because of upper airway obstruction with inspissated secretions.  There was some associated aspiration.  Remains on mechanical ventilation support with minute volume 7 L, FiO2 40%.  Ventilation is in normal range, gas exchange is very good.  She is on treatment with vancomycin for Staph aureus retrieved with BAL.  Mentation today minimally improved.  Follow-up MRI pending.  Family updated regarding progress and plan.  Time spent in management of critical care 45 minutes  
pseudomeningocele. Multilevel cervical spondylosis as detailed. Electronically signed by Eriberto Hawthorne MD    CT HEAD WO CONTRAST    Result Date: 2/3/2025  EXAM: CT HEAD WO CONTRAST CLINICAL  INDICATION: check for hydrocephalus. clamped evd on 2/2 COMPARISON: 1/27/2025 TECHNIQUE:CT HEAD WO CONTRAST  This exam was performed according to our departmental dose-optimization program, which includes automated exposure control, adjustment of the mA and/or kV according to patient size and/or use of iterative reconstruction technique.  Unless otherwise specified, incidental findings do not require dedicated imaging follow-up. FINDINGS: Reidentified right frontal approach ventriculostomy catheter with tip projecting to the foramen of Marlon on the left. There is interval ventriculomegaly. Layering intraventricular hemorrhage with interval temporal evolution of blood products. Also the right frontal parafalcine subdural hematoma has become less conspicuous and right frontal subdural hemorrhage has slightly decreased. Suboccipital craniotomy changes with postoperative fluid. The posterior fossa hemorrhage again seen with temporal evolution of blood products. Associated edema in the posterior fossa with partial effacement of the fourth ventricle. Subarachnoid hemorrhages including right frontal, left parietal region appear left conspicuous.     Right frontal approach ventriculostomy catheter as before with interval ventriculomegaly, hydronephrosis compared with the prior examination. Suboccipital craniotomy with surgical changes, blood products in the surgical bed, intraventricular hemorrhage, and other findings as above. Electronically signed by Kevin Beck    XR CHEST PORTABLE    Result Date: 2/1/2025  PORTABLE AP CHEST AT 1115 HOURS:   HISTORY: Intubation. COMPARISON: 1/24/2025.     FINDINGS: ET tube projects 3 cm above the beatrice. Feeding tube is seen traversing the esophagus into the stomach, its tip not visualized on 
spondylosis as detailed. Electronically signed by Eriberto Hawthorne MD    CT HEAD WO CONTRAST    Result Date: 2/3/2025  EXAM: CT HEAD WO CONTRAST CLINICAL  INDICATION: check for hydrocephalus. clamped evd on 2/2 COMPARISON: 1/27/2025 TECHNIQUE:CT HEAD WO CONTRAST  This exam was performed according to our departmental dose-optimization program, which includes automated exposure control, adjustment of the mA and/or kV according to patient size and/or use of iterative reconstruction technique.  Unless otherwise specified, incidental findings do not require dedicated imaging follow-up. FINDINGS: Reidentified right frontal approach ventriculostomy catheter with tip projecting to the foramen of Marlon on the left. There is interval ventriculomegaly. Layering intraventricular hemorrhage with interval temporal evolution of blood products. Also the right frontal parafalcine subdural hematoma has become less conspicuous and right frontal subdural hemorrhage has slightly decreased. Suboccipital craniotomy changes with postoperative fluid. The posterior fossa hemorrhage again seen with temporal evolution of blood products. Associated edema in the posterior fossa with partial effacement of the fourth ventricle. Subarachnoid hemorrhages including right frontal, left parietal region appear left conspicuous.     Right frontal approach ventriculostomy catheter as before with interval ventriculomegaly, hydronephrosis compared with the prior examination. Suboccipital craniotomy with surgical changes, blood products in the surgical bed, intraventricular hemorrhage, and other findings as above. Electronically signed by Kevin Beck    XR CHEST PORTABLE    Result Date: 2/1/2025  PORTABLE AP CHEST AT 1115 HOURS:   HISTORY: Intubation. COMPARISON: 1/24/2025.     FINDINGS: ET tube projects 3 cm above the beatrice. Feeding tube is seen traversing the esophagus into the stomach, its tip not visualized on this study. The heart and pulmonary 
ventricle. 1/22 Post-op CT H showed posterior fossa craniotomy with partial evacuation of hematoma with associated pneumocephalus with interval development of intraventricular hemorrhage in the left lateral ventricle, as well as extra-axial acute fluid collections over the right frontal region, subarachnoid hemorrhage, and extra-axial bleeding along the falx without midline shift. MRI on 1/27 showed mildly increased size of the lateral and third ventricles with persistent posterior fossa mass effect   - NCC + NSGY following, appreciate recs  - EVD Open at 5 mm Hg   - q4hr neuro checks   - increased provigil 200mg daily  - lisinopril 10mg daily, SBP goal < 160mmHg  - PRN labetalol and hydralazine  - PRN Tylenol, Roxicodone and IV Morphine for pain   - off cEEG, showed severe encephalopathy, no seizures     Pulmonary  #AHRF 2/2 MSSA PNA  Reintubated on 1/24 as she was unable to protect her airway. BAL positive for MSSA on 1/25.  - Vent Day:  9  - Vent Settings: Vent Mode: CPAP/PS Resp Rate (Set): 16 bpm/Vt (Set, mL): 400 mL/ /FiO2 : 30 % / PEEP/CPAP (cmH2O): 5 / SpO2: 99 %   - Not currently on sedation  - Finished abx course on 1/29 for MSSA PNA    Cardiology  #Hypertensive Emergency, resolved  Patient very hypertensive on admission. BP still very labile. Needs better control.  - off cardene gtt  - lisinopril 10mg daily  - SBP goal < 160mmHg  - PRN labetalol and hydralazine    #Sinus pause  Patient had a 5.85 second sinus pause night of 1/28/25 while being turned with a spike in SBP 190s. Coreg was discontinued. Pacemaker discussed with . He would like to monitor for now. No pause over the past 24hrs.  - continuous telemetry  - EP consult pending clinical course    GI/FEN  #Constipation  Patient had not had charted BM since 1/25 but had BM in last 24hrs.  - continue BID senna  - continue BID glycolax    #Electrolyte Abnormalities  - replete lytes as needed  - daily RFP, Mg    Heme  #Leukocytosis, mild  WBC 
  #History MDS  -S/p remote history of bone marrow transplant     Glycemic goal:  140-180 mg/dL  LDAs:  PIV, a-line, EVD, urinary catheter  Infusions: Cardene  Abx: Vancomycin  Diet:  Diet NPO, will discuss TF with dietician  GI PPx:  IV protonix  Bowel Regimen:  [place]  DVT PPx: SCDs, SQH        Code Status:  Prior  Disposition:   Prior to admission:  From Home  Current:  ICU  At discharge:  TBD    Bronson Cole DO, PGY-1  01/27/25  8:55 AM    Patient was seen, examined and discussed with Dr. Cole. I agree with the interval history. My physical exam confirms the findings listed below  Chart was reviewed including labs and medical records confirm the findings noted    Peripheral IV 01/22/25 Posterior;Right Forearm (Active)   Number of days: 5       Peripheral IV 01/22/25 Left Forearm (Active)   Number of days: 5       Peripheral IV 01/22/25 Left Antecubital (Active)   Number of days: 4       Arterial Line 01/22/25 Left Radial (Active)   Number of days: 5     Large cerebella bleed s/p occipital craniotomy and EVD placement   Acute respiratory failure on vent support.  Reintubated on 1/24 as she was unable to protect her airway.  Suspected bulbar weakness.     Hypertensive emergency, now better controlled.  On cardene at 2.5  NPO   EVT per neurosurgery.  Pt is obtunded.      Continue coreg.  Add cozaar   Start TF  Doubt she has pneumonia.  CXR is clear.  Had fever once on 1/24. No tracheal secretions.  O2 requirement is low.  Will keep van for total 5 days.       Pt has a high probability of imminent or life-threatening deterioration requiring close monitoring, and highly complex decision-making and/or interventions of high intensity to assess, manipulate, and support his critical organ systems to prevent a likely inevitable decline which could occur if left untreated.      A total critical care time 35 minutes was used. This includes but not limited to examining patient, collaborating with other 
signed by Kevin Beck    XR CHEST PORTABLE    Result Date: 2/1/2025  PORTABLE AP CHEST AT 1115 HOURS:   HISTORY: Intubation. COMPARISON: 1/24/2025.     FINDINGS: ET tube projects 3 cm above the beatrice. Feeding tube is seen traversing the esophagus into the stomach, its tip not visualized on this study. The heart and pulmonary vasculature are within normal limits. The lungs are well-expanded and appear clear bilaterally. There is no focal infiltrate or pleural effusion.       ETT and feeding tubes in place. No acute cardiopulmonary findings. Electronically signed by Ashley Schilling MD      CBC:   Recent Labs     02/04/25 0505 02/05/25 0528 02/06/25  0337   WBC 15.2* 13.6* 13.9*   HGB 10.2* 9.5* 9.4*    314 296     BMP:    Recent Labs     02/04/25 0505 02/05/25 0528 02/06/25  0337    142 143   K 3.9 3.9 3.4*    107 108   CO2 24 27 27   BUN 19 16 16   CREATININE 0.5* 0.5* 0.5*   GLUCOSE 132* 121* 112*     Hepatic: No results for input(s): \"AST\", \"ALT\", \"BILITOT\", \"ALKPHOS\" in the last 72 hours.    Invalid input(s): \"ALB\"  Lipids:   Lab Results   Component Value Date/Time    CHOL 187 01/23/2025 04:15 AM    HDL 69 01/23/2025 04:15 AM    TRIG 206 01/26/2025 04:22 AM     Hemoglobin A1C:   Lab Results   Component Value Date/Time    LABA1C 4.6 01/23/2025 04:15 AM     TSH:   Lab Results   Component Value Date/Time    TSH 1.25 04/16/2015 10:53 PM     Troponin: No results found for: \"TROPONINT\"  Lactic Acid: No results for input(s): \"LACTA\" in the last 72 hours.  BNP: No results for input(s): \"PROBNP\" in the last 72 hours.  UA:  Lab Results   Component Value Date/Time    NITRU POSITIVE 02/05/2025 06:10 PM    COLORU Yellow 02/05/2025 06:10 PM    PHUR 6.0 02/05/2025 06:10 PM    PHUR 5.5 07/17/2017 12:48 AM    LABCAST 0-1 Hyaline 07/10/2017 05:50 AM    WBCUA 3-5 02/05/2025 06:10 PM    RBCUA 5-10 02/05/2025 06:10 PM    MUCUS 1+ 02/05/2025 06:10 PM    BACTERIA 2+ 02/05/2025 06:10 PM    CLARITYU Clear 02/05/2025 
DO Douglas, PGY-1  01/28/25  7:52 AM    Patient was seen, examined and discussed with Dr. Cole. I agree with the interval history. My physical exam confirms the findings listed below  Chart was reviewed including labs and medical records confirm the findings noted    Peripheral IV 01/22/25 Posterior;Right Forearm (Active)   Number of days: 6       Peripheral IV 01/22/25 Left Forearm (Active)   Number of days: 6       Peripheral IV 01/22/25 Left Antecubital (Active)   Number of days: 5     Large cerebella bleed s/p occipital craniotomy and EVD placement   Acute respiratory failure on vent support.  Reintubated on 1/24 as she was unable to protect her airway.  Suspected bulbar weakness.   , RR 16, FiO2 30, PEEP 5  Hypertensive emergency, now better controlled. Cardene is off.  On coreg and vasotec  On TF  EVD per neurosurgery.    Creatinine is 0.6  Hypernatremia 152.    Possible pneumonia.  She was on vanc.  MRSA is negative.  Currently on ceftriaxone.  Plan to treat with ABX for total of 5 days.       Remain obtunded   Continue vent support.  Off sedation   TF.  Increase free water      Pt has a high probability of imminent or life-threatening deterioration requiring close monitoring, and highly complex decision-making and/or interventions of high intensity to assess, manipulate, and support his critical organ systems to prevent a likely inevitable decline which could occur if left untreated.      A total critical care time 35 minutes was used. This includes but not limited to examining patient, collaborating with other physicians, monitoring vital signs, telemetry, continuous pulse oximetry, and clinical response to IV medications, documentation time, review and interpretation of laboratory and radiological data, review of nursing notes and old record review. This time excludes any time that may have been spent performing procedures for life threatening organ failure    
16.8

## 2025-02-12 NOTE — DISCHARGE SUMMARY
V2.0  Discharge Summary    Name:  Maria Antonia Crawford /Age/Sex: 1945 (79 y.o. female)   Admit Date: 2025  Discharge Date: 25    MRN & CSN:  [id number] & [id number] Encounter Date and Time 25 12:10 PM EST    Attending:  No att. providers found Discharging Provider: Tejas Briceno MD       Hospital Course:     Brief HPI: Ms. Maria Antonia Crawford is a 79 y.o. female with a medical hx significant for MDS s/p bone marrow transplant who presented from home to the ED on  after a ground level fall at home. Before this pt was functionally independent at home, performs all activities of daily living on her own and works from home.  Her  states that the patient developed a headache with nausea and vomiting.  He was in another room and the patient was in the bathroom when he heard a thud.  She went to check on her and found her laying facedown on the ground.      On arrival to the ED, CT Head w/o showing Large posterior fossa intraparenchymal hematoma with intraventricular extension. Severe mass effect upon the brainstem and upon the fourth ventricle. She was intubated     Large posterior fossa intraparenchymal hemorrhage with scattered intraventricular and subarachnoid hemorrhages   Obstructive Hydrocephalus s/p suboccipital craniotomy and EVD placement   -On 2025 she had right frontal EVD/suboccipital decompression and hematoma evacuation by neurosurgery  -MRI on  showed mildly increased size of the lateral and third ventricles with persistent posterior fossa mass effect   -On 2025 she underwent replacement of right EVD by neurosurgery .EVD removed  per neurosurgery   -With lack of improvement family decided on hospice     Acute hypoxic respiratory failure  MSSA and proteus pneumonia  -Extubated then re-intubated . Failed extubation . Tracheostomy placed on 2/3/2025. GI consulted: PEG placed    -2025 BAL positive for MSSA and proteus . Finished a course of

## 2025-02-12 NOTE — CARE COORDINATION
Case Management Assessment            Discharge Note                    Date / Time of Note: 2/12/2025 10:28 AM                  Discharge Note Completed by: Lis Eagle    Patient Name: Maria Antonia Crawford   YOB: 1945  Diagnosis: Intracranial hemorrhage (HCC) [I62.9]   Date / Time: 1/22/2025 11:03 AM    Current PCP: Andrea Uriostegui MD  Clinic patient: No    Hospitalization in the last 30 days: No       Advance Directives:  Code Status: DNR-CC  [place] DNR form completed and on chart: Yes    Financial:  Payor: MEDICARE / Plan: MEDICARE PART A AND B / Product Type: *No Product type* /      Pharmacy:    [id number] - [place], [place] - [address]  P [phone] - F [phone]  [address] [place] [address]  Phone: [phone] Fax: [phone]      Assistance purchasing medications?: Potential Assistance Purchasing Medications: No  Assistance provided by Case Management: None at this time    Does patient want to participate in local refill/ meds to beds program?:      Meds To Beds General Rules:  1. Can ONLY be done Monday- Friday between 8:30am-5pm  2. Prescription(s) must be in pharmacy by 3pm to be filled same day  3.Copy of patient's insurance/ prescription drug card and patient face sheet must be sent along with the prescription(s)  4. Cost of Rx cannot be added to hospital bill. If financial assistance is needed, please contact unit  or ;  or  CANNOT provide pharmacy voucher for patients co-pays  5. Patients can then  the prescription on their way out of the hospital at discharge, or pharmacy can deliver to the bedside if staff is available. (payment due at time of pick-up or delivery - cash, check, or card accepted)     Able to afford home medications/ co-pay costs: Yes    ADLS:  Current PT AM-PAC Score:   /24  Current OT AM-PAC Score:   /24    DISCHARGE Disposition: Inpatient Hospice Unit: [facility]

## 2025-02-12 NOTE — PLAN OF CARE
Brief note  Patient seen and examined  No immediate nursing concerns   mmHg - Nicardipine off since 1400     Intubated, off sedation   Opens eyes weakly and spontaneously   Delayed tracking to voice, more convincing on the right  Makes no attempt to speak or communicate  Does not follow commands   Pupils are 4mm bilaterally, briskly reactive and round  EOMs are intact   No movement in either upper extremity (trap or axilla pinch), Symmetric flexion in BLE to pain   Incision: Open to air, dry bloody drainage, no active drainage     ICP 8 mmHg  EVD OTD @ 5 mmHg  51 ml out since 7 AM today   CSF w/ 9 WBCs, RBC 2400, Protein 21 mg/dL, Glucose 101 mg/dL on 1/28. Meningitis panel negative     CT head 1/27/25  1. Persistent intraventricular hemorrhage and right frontal ventriculostomy catheter with hemorrhage along the tract and subdural regions, slight increase in intraventricular blood pooling or layering in the occipital horns.  2. Persistent extensive suboccipital craniotomy changes and hemorrhage throughout the midline cerebellum and left cerebral hemisphere with low density following resection of the mass  3. Small amount of subarachnoid hemorrhage along the temporal and temporal parietal regions and bilateral frontal regions, similar compared to prior study.  4. Persistent ventricular dilation.     MRI brain with and without contrast: 1/26/25  1. No evidence for intracranial neoplasm or arteriovenous malformation  2. Mildly increased size of the lateral and third ventricles with persistent posterior fossa mass effect effacing the fourth ventricle compatible with progression of obstructive hydrocephalus since placement of the right frontal intraventricular catheter   crossing midline and terminating in the body of the left lateral ventricle. New transependymal flow of CSF is present as well.  3. No obvious change in the amount of cerebellar parenchymal hemorrhage status post suboccipital decompression and 
  Problem: Discharge Planning  Goal: Discharge to home or other facility with appropriate resources  1/26/2025 2229 by Bobby Preston RN  Outcome: Progressing    Problem: Safety - Adult  Goal: Free from fall injury  1/26/2025 2229 by Bobby Preston RN  Outcome: Progressing     Problem: Skin/Tissue Integrity  Goal: Absence of new skin breakdown  Description: 1.  Monitor for areas of redness and/or skin breakdown  2.  Assess vascular access sites hourly  3.  Every 4-6 hours minimum:  Change oxygen saturation probe site  4.  Every 4-6 hours:  If on nasal continuous positive airway pressure, respiratory therapy assess nares and determine need for appliance change or resting period.  1/26/2025 2229 by Bobby Preston RN  Outcome: Progressing     Problem: Pain  Goal: Verbalizes/displays adequate comfort level or baseline comfort level  1/26/2025 2229 by Bobby Preston RN  Outcome: Progressing     Problem: Neurosensory - Adult  Goal: Achieves stable or improved neurological status  1/26/2025 2229 by Bobby Preston RN  Outcome: Progressing    Problem: Neurosensory - Adult  Goal: Achieves maximal functionality and self care  1/26/2025 2229 by Bobby Preston RN  Outcome: Progressing    Problem: Neurosensory - Adult  Goal: Absence of seizures  1/26/2025 2229 by Bobby Preston RN  Outcome: Progressing    Problem: Neurosensory - Adult  Goal: Remains free of injury related to seizures activity  1/26/2025 2229 by Bobby Preston RN  Outcome: Progressing    Problem: Respiratory - Adult  Goal: Achieves optimal ventilation and oxygenation  1/26/2025 2229 by Bobby Preston RN  Outcome: Progressing    Problem: Cardiovascular - Adult  Goal: Maintains optimal cardiac output and hemodynamic stability  1/26/2025 2229 by Bobby Preston RN  Outcome: Progressing    Problem: Cardiovascular - Adult  Goal: Absence of cardiac dysrhythmias or at baseline  1/26/2025 2229 by Bobyb Preston RN  Outcome: Progressing    Problem: Skin/Tissue 
  Problem: Discharge Planning  Goal: Discharge to home or other facility with appropriate resources  1/28/2025 2124 by Bobby Preston RN  Outcome: Progressing     Problem: Safety - Adult  Goal: Free from fall injury  1/28/2025 2124 by Bobby Preston RN  Outcome: Progressing     Problem: Skin/Tissue Integrity  Goal: Absence of new skin breakdown  Description: 1.  Monitor for areas of redness and/or skin breakdown  2.  Assess vascular access sites hourly  3.  Every 4-6 hours minimum:  Change oxygen saturation probe site  4.  Every 4-6 hours:  If on nasal continuous positive airway pressure, respiratory therapy assess nares and determine need for appliance change or resting period.  1/28/2025 2124 by Bobby Preston RN  Outcome: Progressing     Problem: Pain  Goal: Verbalizes/displays adequate comfort level or baseline comfort level  1/28/2025 2124 by Bobby Preston RN  Outcome: Progressing     Problem: Neurosensory - Adult  Goal: Achieves stable or improved neurological status  1/28/2025 2124 by Bobby Preston RN  Outcome: Progressing     Problem: Neurosensory - Adult  Goal: Achieves maximal functionality and self care  1/28/2025 2124 by Bobby Preston RN  Outcome: Progressing     Problem: Neurosensory - Adult  Goal: Absence of seizures  1/28/2025 2124 by Bobby Preston RN  Outcome: Progressing     Problem: Neurosensory - Adult  Goal: Remains free of injury related to seizures activity  1/28/2025 2124 by Bobby Preston RN  Outcome: Progressing     Problem: Respiratory - Adult  Goal: Achieves optimal ventilation and oxygenation  1/28/2025 2124 by Bobby Preston RN  Outcome: Progressing     Problem: Cardiovascular - Adult  Goal: Maintains optimal cardiac output and hemodynamic stability  1/28/2025 2124 by Bobby Preston RN  Outcome: Progressing     Problem: Cardiovascular - Adult  Goal: Absence of cardiac dysrhythmias or at baseline  1/28/2025 2124 by Bobby Preston RN  Outcome: Progressing     Problem: 
  Problem: Discharge Planning  Goal: Discharge to home or other facility with appropriate resources  2/11/2025 2319 by Contreras Navarro, RN  Outcome: Progressing  Flowsheets (Taken 2/11/2025 2319)  Discharge to home or other facility with appropriate resources:   Identify barriers to discharge with patient and caregiver   Identify discharge learning needs (meds, wound care, etc)   Arrange for needed discharge resources and transportation as appropriate       Problem: Safety - Adult  Goal: Free from fall injury  2/11/2025 2319 by Contreras Navarro, RN  Outcome: Progressing  Flowsheets (Taken 2/11/2025 2319)  Free From Fall Injury:   Instruct family/caregiver on patient safety   Based on caregiver fall risk screen, instruct family/caregiver to ask for assistance with transferring infant if caregiver noted to have fall risk factors       Problem: Skin/Tissue Integrity  Goal: Absence of new skin breakdown  Description: 1.  Monitor for areas of redness and/or skin breakdown  2.  Assess vascular access sites hourly  3.  Every 4-6 hours minimum:  Change oxygen saturation probe site  4.  Every 4-6 hours:  If on nasal continuous positive airway pressure, respiratory therapy assess nares and determine need for appliance change or resting period.  2/11/2025 2319 by Contreras Navarro, RN  Outcome: Progressing  Note: Patient is on comfort care measures. Turning/ repositioning for comfort as needed. Pillow support utilized.      Problem: Pain  Goal: Verbalizes/displays adequate comfort level or baseline comfort level  2/11/2025 2319 by Contreras Navarro, RN  Outcome: Progressing  Flowsheets (Taken 2/11/2025 2319)  Verbalizes/displays adequate comfort level or baseline comfort level:   Encourage patient to monitor pain and request assistance   Administer analgesics based on type and severity of pain and evaluate response   Consider cultural and social influences on pain and pain management   Assess pain using appropriate pain scale   
  Problem: Discharge Planning  Goal: Discharge to home or other facility with appropriate resources  2/5/2025 1419 by Madelyn Livingston RN  Outcome: Progressing  Flowsheets (Taken 2/5/2025 0800)  Discharge to home or other facility with appropriate resources:   Identify barriers to discharge with patient and caregiver   Arrange for needed discharge resources and transportation as appropriate   Identify discharge learning needs (meds, wound care, etc)     Problem: Safety - Adult  Goal: Free from fall injury  2/5/2025 1419 by Madelyn Livingston RN  Outcome: Progressing  Flowsheets (Taken 2/5/2025 0832)  Free From Fall Injury:   Instruct family/caregiver on patient safety   Based on caregiver fall risk screen, instruct family/caregiver to ask for assistance with transferring infant if caregiver noted to have fall risk factors     Problem: Pain  Goal: Verbalizes/displays adequate comfort level or baseline comfort level  2/5/2025 1419 by Madelyn Livingston RN  Outcome: Progressing  Flowsheets (Taken 2/5/2025 0800)  Verbalizes/displays adequate comfort level or baseline comfort level:   Encourage patient to monitor pain and request assistance   Assess pain using appropriate pain scale   Administer analgesics based on type and severity of pain and evaluate response     Problem: Neurosensory - Adult  Goal: Achieves stable or improved neurological status  2/5/2025 1419 by Madelyn Livingston RN  Outcome: Progressing  Flowsheets (Taken 2/5/2025 0800)  Achieves stable or improved neurological status:   Assess for and report changes in neurological status   Initiate measures to prevent increased intracranial pressure     Problem: Neurosensory - Adult  Goal: Achieves maximal functionality and self care  2/5/2025 1419 by Madelyn Livingston RN  Outcome: Progressing  Flowsheets (Taken 2/5/2025 0800)  Achieves maximal functionality and self care:   Monitor swallowing and airway patency with patient fatigue and changes in neurological status   
  Problem: Discharge Planning  Goal: Discharge to home or other facility with appropriate resources  2/6/2025 1208 by Madelyn Livingston RN  Outcome: Progressing  Flowsheets (Taken 2/6/2025 0800)  Discharge to home or other facility with appropriate resources:   Identify barriers to discharge with patient and caregiver   Arrange for needed discharge resources and transportation as appropriate   Identify discharge learning needs (meds, wound care, etc)     Problem: Safety - Adult  Goal: Free from fall injury  2/6/2025 1208 by Madelyn Livingston RN  Outcome: Progressing  Flowsheets (Taken 2/6/2025 0904)  Free From Fall Injury:   Instruct family/caregiver on patient safety   Based on caregiver fall risk screen, instruct family/caregiver to ask for assistance with transferring infant if caregiver noted to have fall risk factors     Problem: Pain  Goal: Verbalizes/displays adequate comfort level or baseline comfort level  2/6/2025 1208 by Madelyn Livingston RN  Outcome: Progressing  Flowsheets (Taken 2/6/2025 0800)  Verbalizes/displays adequate comfort level or baseline comfort level:   Encourage patient to monitor pain and request assistance   Assess pain using appropriate pain scale   Administer analgesics based on type and severity of pain and evaluate response     Problem: Neurosensory - Adult  Goal: Achieves stable or improved neurological status  2/6/2025 1208 by Madelyn Livingston RN  Outcome: Progressing  Flowsheets (Taken 2/6/2025 0800)  Achieves stable or improved neurological status:   Assess for and report changes in neurological status   Initiate measures to prevent increased intracranial pressure   Maintain blood pressure and fluid volume within ordered parameters to optimize cerebral perfusion and minimize risk of hemorrhage     Problem: Neurosensory - Adult  Goal: Achieves maximal functionality and self care  2/6/2025 1208 by Madelyn Livingston RN  Outcome: Progressing  Flowsheets (Taken 2/6/2025 0800)  Achieves maximal 
  Problem: Discharge Planning  Goal: Discharge to home or other facility with appropriate resources  2/7/2025 2326 by Michael Villa RN  Outcome: Progressing  2/7/2025 1258 by Hanna Austin RN  Outcome: Progressing  Flowsheets (Taken 2/7/2025 0800)  Discharge to home or other facility with appropriate resources: Identify barriers to discharge with patient and caregiver     Problem: Safety - Adult  Goal: Free from fall injury  2/7/2025 2326 by Michael Villa RN  Outcome: Progressing  2/7/2025 1258 by Hanna Austin RN  Outcome: Progressing     Problem: Skin/Tissue Integrity  Goal: Absence of new skin breakdown  Description: 1.  Monitor for areas of redness and/or skin breakdown  2.  Assess vascular access sites hourly  3.  Every 4-6 hours minimum:  Change oxygen saturation probe site  4.  Every 4-6 hours:  If on nasal continuous positive airway pressure, respiratory therapy assess nares and determine need for appliance change or resting period.  2/7/2025 2326 by Michael Villa RN  Outcome: Progressing  2/7/2025 1258 by Hanna Austin RN  Outcome: Progressing     Problem: Pain  Goal: Verbalizes/displays adequate comfort level or baseline comfort level  2/7/2025 2326 by Michael Villa RN  Outcome: Progressing  2/7/2025 1258 by Hanna Austin RN  Outcome: Progressing  Flowsheets  Taken 2/7/2025 0400 by Jarocho Elkins RN  Verbalizes/displays adequate comfort level or baseline comfort level: Encourage patient to monitor pain and request assistance  Taken 2/7/2025 0000 by Jarocho Elkins RN  Verbalizes/displays adequate comfort level or baseline comfort level: Encourage patient to monitor pain and request assistance     Problem: Neurosensory - Adult  Goal: Achieves stable or improved neurological status  Outcome: Progressing  Goal: Achieves maximal functionality and self care  Outcome: Progressing  Goal: Absence of seizures  Outcome: Progressing  Goal: Remains free of injury related to seizures 
  Problem: Discharge Planning  Goal: Discharge to home or other facility with appropriate resources  2/8/2025 0913 by Leigh Ann Molina RN  Outcome: Progressing     Problem: Safety - Adult  Goal: Free from fall injury  2/8/2025 0913 by Leigh Ann Molina RN  Outcome: Progressing     Problem: Skin/Tissue Integrity  Goal: Absence of new skin breakdown  Description: 1.  Monitor for areas of redness and/or skin breakdown  2.  Assess vascular access sites hourly  3.  Every 4-6 hours minimum:  Change oxygen saturation probe site  4.  Every 4-6 hours:  If on nasal continuous positive airway pressure, respiratory therapy assess nares and determine need for appliance change or resting period.  2/8/2025 0913 by Leigh Ann Molina RN  Outcome: Progressing     Problem: Pain  Goal: Verbalizes/displays adequate comfort level or baseline comfort level  2/8/2025 0913 by Leigh Ann Molina RN  Outcome: Progressing     Problem: Neurosensory - Adult  Goal: Achieves stable or improved neurological status  2/8/2025 0913 by Leigh Ann Molina RN  Outcome: Progressing     Problem: Neurosensory - Adult  Goal: Achieves maximal functionality and self care  2/8/2025 0913 by Leigh Ann Molina RN  Outcome: Progressing     Problem: Neurosensory - Adult  Goal: Absence of seizures  2/8/2025 0913 by Leigh Ann Molina RN  Outcome: Progressing     Problem: Neurosensory - Adult  Goal: Remains free of injury related to seizures activity  2/8/2025 0913 by Leigh Ann Molina RN  Outcome: Progressing     Problem: Respiratory - Adult  Goal: Achieves optimal ventilation and oxygenation  2/8/2025 0913 by Leigh Ann Molina RN  Outcome: Progressing     Problem: Cardiovascular - Adult  Goal: Maintains optimal cardiac output and hemodynamic stability  2/8/2025 0913 by Leigh Ann Molina RN  Outcome: Progressing     Problem: Cardiovascular - Adult  Goal: Absence of cardiac dysrhythmias or at baseline  2/8/2025 0913 by Leigh Ann Molina RN  Outcome: Progressing     Problem: Skin/Tissue 
  Problem: Discharge Planning  Goal: Discharge to home or other facility with appropriate resources  2/9/2025 0827 by Leigh Ann Molina RN  Outcome: Progressing     Problem: Safety - Adult  Goal: Free from fall injury  2/9/2025 0827 by Leigh Ann Molina RN  Outcome: Progressing     Problem: Skin/Tissue Integrity  Goal: Absence of new skin breakdown  Description: 1.  Monitor for areas of redness and/or skin breakdown  2.  Assess vascular access sites hourly  3.  Every 4-6 hours minimum:  Change oxygen saturation probe site  4.  Every 4-6 hours:  If on nasal continuous positive airway pressure, respiratory therapy assess nares and determine need for appliance change or resting period.  2/9/2025 0827 by Leigh Ann Molina RN  Outcome: Progressing     Problem: Pain  Goal: Verbalizes/displays adequate comfort level or baseline comfort level  2/9/2025 0827 by Leigh Ann Molina RN  Outcome: Progressing     Problem: Neurosensory - Adult  Goal: Achieves stable or improved neurological status  2/9/2025 0827 by Leigh Ann Molina RN  Outcome: Progressing     Problem: Neurosensory - Adult  Goal: Achieves maximal functionality and self care  2/9/2025 0827 by Leigh Ann Molina RN  Outcome: Progressing     Problem: Neurosensory - Adult  Goal: Absence of seizures  2/9/2025 0827 by Leigh Ann Molina RN  Outcome: Progressing     Problem: Neurosensory - Adult  Goal: Remains free of injury related to seizures activity  2/9/2025 0827 by Leigh Ann Molina RN  Outcome: Progressing     Problem: Respiratory - Adult  Goal: Achieves optimal ventilation and oxygenation  2/9/2025 0827 by Leigh Ann Molina RN  Outcome: Progressing     Problem: Cardiovascular - Adult  Goal: Maintains optimal cardiac output and hemodynamic stability  2/9/2025 0827 by Leigh Ann Molina RN  Outcome: Progressing     Problem: Cardiovascular - Adult  Goal: Absence of cardiac dysrhythmias or at baseline  2/9/2025 0827 by Leigh Ann Molina RN  Outcome: Progressing     Problem: Skin/Tissue 
  Problem: Discharge Planning  Goal: Discharge to home or other facility with appropriate resources  Outcome: Not Progressing  Flowsheets (Taken 2/3/2025 0800)  Discharge to home or other facility with appropriate resources: Identify barriers to discharge with patient and caregiver     Problem: Neurosensory - Adult  Goal: Achieves stable or improved neurological status  Outcome: Not Progressing  Flowsheets (Taken 2/3/2025 0800)  Achieves stable or improved neurological status:   Assess for and report changes in neurological status   Initiate measures to prevent increased intracranial pressure   Maintain blood pressure and fluid volume within ordered parameters to optimize cerebral perfusion and minimize risk of hemorrhage   Monitor temperature, glucose, and sodium. Initiate appropriate interventions as ordered     Problem: Musculoskeletal - Adult  Goal: Return mobility to safest level of function  Outcome: Not Progressing  Flowsheets (Taken 2/3/2025 0800)  Return Mobility to Safest Level of Function: Apply continuous passive motion per provider or physical therapy orders to increase flexion toward goal     Problem: Gastrointestinal - Adult  Goal: Maintains or returns to baseline bowel function  Outcome: Not Progressing  Flowsheets (Taken 2/3/2025 0800)  Maintains or returns to baseline bowel function: Assess bowel function     Problem: Genitourinary - Adult  Goal: Absence of urinary retention  Outcome: Not Progressing  Flowsheets (Taken 2/3/2025 0800)  Absence of urinary retention:   Assess patient’s ability to void and empty bladder   Monitor intake/output and perform bladder scan as needed     Problem: Discharge Planning  Goal: Discharge to home or other facility with appropriate resources  Outcome: Not Progressing  Flowsheets (Taken 2/3/2025 0800)  Discharge to home or other facility with appropriate resources: Identify barriers to discharge with patient and caregiver     Problem: Neurosensory - Adult  Goal: 
  Problem: Discharge Planning  Goal: Discharge to home or other facility with appropriate resources  Outcome: Progressing     Problem: Safety - Adult  Goal: Free from fall injury  Outcome: Progressing     Problem: Skin/Tissue Integrity  Goal: Absence of new skin breakdown  Description: 1.  Monitor for areas of redness and/or skin breakdown  2.  Assess vascular access sites hourly  3.  Every 4-6 hours minimum:  Change oxygen saturation probe site  4.  Every 4-6 hours:  If on nasal continuous positive airway pressure, respiratory therapy assess nares and determine need for appliance change or resting period.  Outcome: Progressing     Problem: Pain  Goal: Verbalizes/displays adequate comfort level or baseline comfort level  Outcome: Progressing     Problem: Neurosensory - Adult  Goal: Achieves stable or improved neurological status  Outcome: Progressing     Problem: Neurosensory - Adult  Goal: Achieves maximal functionality and self care  Outcome: Progressing     Problem: Neurosensory - Adult  Goal: Absence of seizures  Outcome: Progressing     Problem: Neurosensory - Adult  Goal: Remains free of injury related to seizures activity  Outcome: Progressing     Problem: Neurosensory - Adult  Goal: Remains free of injury related to seizures activity  Outcome: Progressing     Problem: Respiratory - Adult  Goal: Achieves optimal ventilation and oxygenation  Outcome: Progressing     Problem: Cardiovascular - Adult  Goal: Maintains optimal cardiac output and hemodynamic stability  Outcome: Progressing     Problem: Cardiovascular - Adult  Goal: Absence of cardiac dysrhythmias or at baseline  Outcome: Progressing     Problem: Skin/Tissue Integrity - Adult  Goal: Skin integrity remains intact  Outcome: Progressing     Problem: Skin/Tissue Integrity - Adult  Goal: Incisions, wounds, or drain sites healing without S/S of infection  Outcome: Progressing     Problem: Skin/Tissue Integrity - Adult  Goal: Oral mucous membranes remain 
  Problem: Discharge Planning  Goal: Discharge to home or other facility with appropriate resources  Outcome: Progressing     Problem: Safety - Medical Restraint  Goal: Remains free of injury from restraints (Restraint for Interference with Medical Device)  Description: INTERVENTIONS:  1. Determine that other, less restrictive measures have been tried or would not be effective before applying the restraint  2. Evaluate the patient's condition at the time of restraint application  3. Inform patient/family regarding the reason for restraint  4. Q2H: Monitor safety, psychosocial status, comfort, nutrition and hydration  Outcome: Progressing  Flowsheets  Taken 1/23/2025 0200  Remains free of injury from restraints (restraint for interference with medical device):   Determine that other, less restrictive measures have been tried or would not be effective before applying the restraint   Evaluate the patient's condition at the time of restraint application   Inform patient/family regarding the reason for restraint   Every 2 hours: Monitor safety, psychosocial status, comfort, nutrition and hydration  Taken 1/23/2025 0000  Remains free of injury from restraints (restraint for interference with medical device):   Determine that other, less restrictive measures have been tried or would not be effective before applying the restraint   Evaluate the patient's condition at the time of restraint application   Inform patient/family regarding the reason for restraint   Every 2 hours: Monitor safety, psychosocial status, comfort, nutrition and hydration     Problem: Safety - Adult  Goal: Free from fall injury  Outcome: Progressing     Problem: Skin/Tissue Integrity  Goal: Absence of new skin breakdown  Description: 1.  Monitor for areas of redness and/or skin breakdown  2.  Assess vascular access sites hourly  3.  Every 4-6 hours minimum:  Change oxygen saturation probe site  4.  Every 4-6 hours:  If on nasal continuous positive 
  Problem: Discharge Planning  Goal: Discharge to home or other facility with appropriate resources  Outcome: Progressing  Flowsheets (Taken 1/25/2025 0800)  Discharge to home or other facility with appropriate resources:   Identify barriers to discharge with patient and caregiver   Arrange for needed discharge resources and transportation as appropriate   Identify discharge learning needs (meds, wound care, etc)   Arrange for interpreters to assist at discharge as needed     Problem: Safety - Medical Restraint  Goal: Remains free of injury from restraints (Restraint for Interference with Medical Device)  Description: INTERVENTIONS:  1. Determine that other, less restrictive measures have been tried or would not be effective before applying the restraint  2. Evaluate the patient's condition at the time of restraint application  3. Inform patient/family regarding the reason for restraint  4. Q2H: Monitor safety, psychosocial status, comfort, nutrition and hydration  Recent Flowsheet Documentation  Taken 1/25/2025 1800 by Vicky Hale RN  Remains free of injury from restraints (restraint for interference with medical device):   Determine that other, less restrictive measures have been tried or would not be effective before applying the restraint   Evaluate the patient's condition at the time of restraint application   Every 2 hours: Monitor safety, psychosocial status, comfort, nutrition and hydration   Inform patient/family regarding the reason for restraint  Taken 1/25/2025 1600 by Vicky Hale RN  Remains free of injury from restraints (restraint for interference with medical device):   Determine that other, less restrictive measures have been tried or would not be effective before applying the restraint   Evaluate the patient's condition at the time of restraint application   Every 2 hours: Monitor safety, psychosocial status, comfort, nutrition and hydration   Inform patient/family regarding the 
  Problem: Discharge Planning  Goal: Discharge to home or other facility with appropriate resources  Outcome: Progressing  Flowsheets (Taken 1/31/2025 2001)  Discharge to home or other facility with appropriate resources: Identify barriers to discharge with patient and caregiver     Problem: Safety - Adult  Goal: Free from fall injury  Outcome: Progressing     Problem: Skin/Tissue Integrity  Goal: Absence of new skin breakdown  Description: 1.  Monitor for areas of redness and/or skin breakdown  2.  Assess vascular access sites hourly  3.  Every 4-6 hours minimum:  Change oxygen saturation probe site  4.  Every 4-6 hours:  If on nasal continuous positive airway pressure, respiratory therapy assess nares and determine need for appliance change or resting period.  Outcome: Progressing     Problem: Pain  Goal: Verbalizes/displays adequate comfort level or baseline comfort level  Outcome: Progressing     Problem: Neurosensory - Adult  Goal: Achieves stable or improved neurological status  Outcome: Progressing  Flowsheets (Taken 1/31/2025 2000)  Achieves stable or improved neurological status: Assess for and report changes in neurological status     Problem: Neurosensory - Adult  Goal: Achieves maximal functionality and self care  Outcome: Progressing  Flowsheets (Taken 1/31/2025 2000)  Achieves maximal functionality and self care: Monitor swallowing and airway patency with patient fatigue and changes in neurological status     Problem: Neurosensory - Adult  Goal: Absence of seizures  Outcome: Progressing  Flowsheets (Taken 1/31/2025 2000)  Absence of seizures: Monitor for seizure activity.  If seizure occurs, document type and location of movements and any associated apnea     
  Problem: Discharge Planning  Goal: Discharge to home or other facility with appropriate resources  Outcome: Progressing  Flowsheets (Taken 2/5/2025 0651)  Discharge to home or other facility with appropriate resources:   Identify barriers to discharge with patient and caregiver   Arrange for needed discharge resources and transportation as appropriate   Arrange for interpreters to assist at discharge as needed   Identify discharge learning needs (meds, wound care, etc)   Refer to discharge planning if patient needs post-hospital services based on physician order or complex needs related to functional status, cognitive ability or social support system     Problem: Neurosensory - Adult  Goal: Achieves stable or improved neurological status  Outcome: Progressing  Flowsheets (Taken 2/5/2025 0651)  Achieves stable or improved neurological status:   Assess for and report changes in neurological status   Maintain blood pressure and fluid volume within ordered parameters to optimize cerebral perfusion and minimize risk of hemorrhage   Initiate measures to prevent increased intracranial pressure   Monitor temperature, glucose, and sodium. Initiate appropriate interventions as ordered     
  Problem: Discharge Planning  Goal: Discharge to home or other facility with appropriate resources  Outcome: Progressing  Flowsheets (Taken 2/7/2025 0800)  Discharge to home or other facility with appropriate resources: Identify barriers to discharge with patient and caregiver     Problem: Safety - Adult  Goal: Free from fall injury  Outcome: Progressing     Problem: Skin/Tissue Integrity  Goal: Absence of new skin breakdown  Description: 1.  Monitor for areas of redness and/or skin breakdown  2.  Assess vascular access sites hourly  3.  Every 4-6 hours minimum:  Change oxygen saturation probe site  4.  Every 4-6 hours:  If on nasal continuous positive airway pressure, respiratory therapy assess nares and determine need for appliance change or resting period.  Outcome: Progressing    Problem: Cardiovascular - Adult  Goal: Maintains optimal cardiac output and hemodynamic stability  Outcome: Progressing  Maintains optimal cardiac output and hemodynamic stability: Monitor blood pressure and heart rate     Problem: Skin/Tissue Integrity - Adult  Goal: Skin integrity remains intact  Outcome: Progressing     
  Problem: Safety - Adult  Goal: Free from fall injury  Outcome: Progressing     Problem: Neurosensory - Adult  Goal: Achieves stable or improved neurological status  Outcome: Progressing  Flowsheets (Taken 1/30/2025 2000)  Achieves stable or improved neurological status: Assess for and report changes in neurological status     Problem: Neurosensory - Adult  Goal: Achieves maximal functionality and self care  Outcome: Progressing  Flowsheets (Taken 1/30/2025 2000)  Achieves maximal functionality and self care: Monitor swallowing and airway patency with patient fatigue and changes in neurological status     Problem: Neurosensory - Adult  Goal: Absence of seizures  Outcome: Progressing  Flowsheets (Taken 1/30/2025 2000)  Absence of seizures: Monitor for seizure activity.  If seizure occurs, document type and location of movements and any associated apnea     Problem: Safety - Medical Restraint  Goal: Remains free of injury from restraints (Restraint for Interference with Medical Device)  Description: INTERVENTIONS:  1. Determine that other, less restrictive measures have been tried or would not be effective before applying the restraint  2. Evaluate the patient's condition at the time of restraint application  3. Inform patient/family regarding the reason for restraint  4. Q2H: Monitor safety, psychosocial status, comfort, nutrition and hydration  Outcome: Progressing       
  Problem: Safety - Adult  Goal: Free from fall injury  Outcome: Progressing     Problem: Skin/Tissue Integrity  Goal: Absence of new skin breakdown  Description: 1.  Monitor for areas of redness and/or skin breakdown  2.  Assess vascular access sites hourly  3.  Every 4-6 hours minimum:  Change oxygen saturation probe site  4.  Every 4-6 hours:  If on nasal continuous positive airway pressure, respiratory therapy assess nares and determine need for appliance change or resting period.  Outcome: Progressing     Problem: Pain  Goal: Verbalizes/displays adequate comfort level or baseline comfort level  Outcome: Progressing     Problem: Neurosensory - Adult  Goal: Achieves stable or improved neurological status  Outcome: Progressing  Goal: Achieves maximal functionality and self care  Outcome: Progressing  Goal: Absence of seizures  Outcome: Progressing  Goal: Remains free of injury related to seizures activity  Outcome: Progressing     Problem: Respiratory - Adult  Goal: Achieves optimal ventilation and oxygenation  Outcome: Progressing     Problem: Cardiovascular - Adult  Goal: Maintains optimal cardiac output and hemodynamic stability  Outcome: Progressing     Problem: Cardiovascular - Adult  Goal: Maintains optimal cardiac output and hemodynamic stability  Outcome: Progressing  Goal: Absence of cardiac dysrhythmias or at baseline  Outcome: Progressing     Problem: Skin/Tissue Integrity - Adult  Goal: Skin integrity remains intact  Outcome: Progressing  Goal: Incisions, wounds, or drain sites healing without S/S of infection  Outcome: Progressing  Goal: Oral mucous membranes remain intact  Outcome: Progressing     Problem: Musculoskeletal - Adult  Goal: Return mobility to safest level of function  Outcome: Progressing  Goal: Maintain proper alignment of affected body part  Outcome: Progressing  Goal: Return ADL status to a safe level of function  Outcome: Progressing     Problem: Gastrointestinal - Adult  Goal: 
  Problem: Safety - Medical Restraint  Goal: Remains free of injury from restraints (Restraint for Interference with Medical Device)  Description: INTERVENTIONS:  1. Determine that other, less restrictive measures have been tried or would not be effective before applying the restraint  2. Evaluate the patient's condition at the time of restraint application  3. Inform patient/family regarding the reason for restraint  4. Q2H: Monitor safety, psychosocial status, comfort, nutrition and hydration  1/23/2025 1512 by Loretta Espinoza RN  Outcome: Completed  Flowsheets  Taken 1/23/2025 0800 by Loretta Espinoza, RN  Remains free of injury from restraints (restraint for interference with medical device):   Evaluate the patient's condition at the time of restraint application   Determine that other, less restrictive measures have been tried or would not be effective before applying the restraint   Inform patient/family regarding the reason for restraint   Every 2 hours: Monitor safety, psychosocial status, comfort, nutrition and hydration    =     
  Problem: Safety - Medical Restraint  Goal: Remains free of injury from restraints (Restraint for Interference with Medical Device)  Description: INTERVENTIONS:  1. Determine that other, less restrictive measures have been tried or would not be effective before applying the restraint  2. Evaluate the patient's condition at the time of restraint application  3. Inform patient/family regarding the reason for restraint  4. Q2H: Monitor safety, psychosocial status, comfort, nutrition and hydration  Recent Flowsheet Documentation  Taken 1/25/2025 0400 by Ashley Fletcher RN  Remains free of injury from restraints (restraint for interference with medical device):   Determine that other, less restrictive measures have been tried or would not be effective before applying the restraint   Evaluate the patient's condition at the time of restraint application   Inform patient/family regarding the reason for restraint   Every 2 hours: Monitor safety, psychosocial status, comfort, nutrition and hydration  Taken 1/25/2025 0200 by Ashley Fletcher RN  Remains free of injury from restraints (restraint for interference with medical device):   Determine that other, less restrictive measures have been tried or would not be effective before applying the restraint   Evaluate the patient's condition at the time of restraint application   Inform patient/family regarding the reason for restraint   Every 2 hours: Monitor safety, psychosocial status, comfort, nutrition and hydration  Taken 1/25/2025 0000 by Ashley Fletcher RN  Remains free of injury from restraints (restraint for interference with medical device):   Determine that other, less restrictive measures have been tried or would not be effective before applying the restraint   Evaluate the patient's condition at the time of restraint application   Inform patient/family regarding the reason for restraint   Every 2 hours: Monitor safety, psychosocial status, comfort, nutrition and 
Brief note  Patient seen and examined  No immediate nursing concerns    Intubated, off sedation   Does not regard examiner  Makes no attempt to speak or communicate  Does not follow commands   No eye opening today  Pupils are 3mm bilaterally, brisk and round  EOMs are intact with oculocephalic reflex  No movement in either upper extremity (trap or axilla pinch), Symmetric flexion in BLE to pain   Incision: Open to air, dry bloody drainage, no active drainage    ICP 0 mmHg  EVD OTD @ 5 mmHg  Minimal output, but begins to drain if dropped to floor, discussed w/ nursing  61 ml out since 7 AM yesterday  CSF w/ 9 WBCs, RBC 2400, Protein 21 mg/dL, Glucose 101 mg/dL     CT head 1/27/25  1. Persistent intraventricular hemorrhage and right frontal ventriculostomy catheter with hemorrhage along the tract and subdural regions, slight increase in intraventricular blood pooling or layering in the occipital horns.  2. Persistent extensive suboccipital craniotomy changes and hemorrhage throughout the midline cerebellum and left cerebral hemisphere with low density following resection of the mass  3. Small amount of subarachnoid hemorrhage along the temporal and temporal parietal regions and bilateral frontal regions, similar compared to prior study.  4. Persistent ventricular dilation.    cEEG  The interictal EEG was abnormal due to abundant, generalized blunt sharp wave discharges suggestive of cortical irritability. The triphasic wave sharp wave discharges with diffuse delta slowing admixed with rare theta frequencies suggestive of severe encephalopathy.     Plan  - SBP discussed w/ nursing - to give labetalol, start nicardipine if SBP remains > 160 mmhg  - Continue EVD level at current rate for now. Anticipate dropping to 0 in AM.   - EVD output low but again, CSF flows if level is dropped to ground.   - Recommendations otherwise unchanged from those documented by KAROL Martinez 1/28/25 @ 9:40 AM.   - CSF non-infectious    Yamileth 
Brief note  Patient seen and examined  No immediate nursing concerns   mmHg - RN just resumed nicardipine gtt prior to my arrival     Intubated, off sedation   Opens eyes weakly and spontaneously   I suspect she tracked my voice today with her eyes when positioned to her right.   Makes no attempt to speak or communicate  Does not follow commands   Pupils are 3mm bilaterally, briskly reactive and round  EOMs are intact   No movement in either upper extremity (trap or axilla pinch), Symmetric flexion in BLE to pain   Incision: Open to air, dry bloody drainage, no active drainage    ICP 7 mmHg  EVD OTD @ 5 mmHg  65 ml out since 7 AM today   CSF w/ 9 WBCs, RBC 2400, Protein 21 mg/dL, Glucose 101 mg/dL     CT head 1/27/25  1. Persistent intraventricular hemorrhage and right frontal ventriculostomy catheter with hemorrhage along the tract and subdural regions, slight increase in intraventricular blood pooling or layering in the occipital horns.  2. Persistent extensive suboccipital craniotomy changes and hemorrhage throughout the midline cerebellum and left cerebral hemisphere with low density following resection of the mass  3. Small amount of subarachnoid hemorrhage along the temporal and temporal parietal regions and bilateral frontal regions, similar compared to prior study.  4. Persistent ventricular dilation.    MRI brain with and without contrast: 1/26/25  1. No evidence for intracranial neoplasm or arteriovenous malformation  2. Mildly increased size of the lateral and third ventricles with persistent posterior fossa mass effect effacing the fourth ventricle compatible with progression of obstructive hydrocephalus since placement of the right frontal intraventricular catheter   crossing midline and terminating in the body of the left lateral ventricle. New transependymal flow of CSF is present as well.  3. No obvious change in the amount of cerebellar parenchymal hemorrhage status post suboccipital 
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     Her exam has remained poor. cEEG and CSF reassuring. Suspect her comatose state is likely secondary to persistent mass effect upon her brainstem, which will take time to resolve. Failed clamping trial 2/2.         EVD OTD at 5  ICP staying consistent at 5  S/p trach placement.   Opens eyes with trap pinch and loud name calling  Wiggled toes on left foot to command but would not squeeze my hand  Makes no attempts to speak around ETT  Regards examiner on either side to command with manual eye opening.   No blink to threat.   Pupils equal round and reactive 3 mm > 2 mm  EOM's intact with OCR.   Slightly dysconjugate gaze.   + corneals  + cough/gag  No facial weakness. Exam limited as patient face at rest.   No spontaneous movement. In her limbs.   WTP in BLE'S  Wtp in the LUE  No movement to pain in RUE    
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 Kay.     Suspect this is a primary ICH in the setting of uncontrolled hypertension.     She required re-intubation d/t respiratory distress on 1/24/25. Suspect this is likely because her large, bi-hemispheric cerebellar lesion and persistent mass effect on her brainstem causing significant dysphagia in addition to her decreased LOC.      Intubated, not on sedation  EVD OTD at 10 mmHg.  ICP fluctuating between 12-14  Waveform dampened .  EVD re-leveled and confirmed to be level with bubble level device. Wave form slightly improved but still dampened.   On Elsie 2.5 mg/hr with SBP at goal and less than 160 mmHg   MRI w/ & w/o pending.     PHYSICAL EXAM:  Vitals:    01/25/25 1900 01/25/25 1915 01/25/25 1930 01/25/25 2001   BP: (!) 153/66 (!) 156/74 (!) 157/74    Pulse: 81 84 84 89   Resp: 16 16 16 16   Temp:       TempSrc:       SpO2: 100% 100% 100% 100%   Weight:       Height:             General: Intubated, no distress, well-nourished  Neurologic  Mental status:   Opened left eye with loud name calling and some tactile stimulation. Required manual eye opening of the right eye. Intubated, TAO speech. Requires repeated verbal cues to attend exam. Does follow commands. Was able to weakly wiggle her toes, left more brisk than right. Had subtle hand squeeze on the left to command, which was reproducible. Would not squeeze my finger with her right hand. Would not give thumbs up on either side.       Cranial nerves:   CN2: No blink to threat.  CN 3,4,6: Pupils 3 mm > 2 mm equal and reactive to light, slight left gaze preference with some EOM's with OCR  CN5: + corneal reflex bilaterally  CN7: Face 
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     Suspect this is a primary ICH in the setting of uncontrolled hypertension.     She has required re-intubation and her exam has been fluctuating although overall, has remained poor and not on sedation. EEG does not indicated seizure and her CSF, in general is not indicative of infection. Suspect her comatose state is likely secondary to persistent mass effect upon her brainstem, which will take time to resolve.        EVD OTD at 5  ICP's fluctuating between 2-3 with good wave form.     Opens eyes with loud name calling and trap pressure. Only maintains eye opening briefly.  Makes no attempts to speak around ETT  Poor attention  With multiple repeated commands she did squeeze my hand on the left and flicked her toes on the left foot  Would not follow commands on the righ    No blink to threat  With manual eye opening would regard examiner on either side to command. EOM's intact  Pupils 4 mm > 3 mm equal, round and reactive.   + corneal reflexes  Face symmetric at rest, exam limited given ETT  + cough/gag with   Flexes to pain in the LUE  WTP in BLE's  No response to pain in the RUE.  
Brief note:  Patient seen and examined.    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     Suspect this is a primary ICH in the setting of uncontrolled hypertension.     She has required re-intubation and her exam has been fluctuating although overall, has remained poor and not on sedation. EEG does not indicated seizure and her CSF, in general is not indicative of infection. Suspect her comatose state is likely secondary to persistent mass effect upon her brainstem, which will take time to resolve.      - No changes to plan of care as documented by KAROL Goldstein earlier today.     EVD OTD at 5  ICP's fluctuating between 1-3 with good wave form.     Opens eyes with loud name calling and trap pressure. Only maintains eye opening briefly.  Makes no attempts to speak around ETT  Poor attention  Not following commands    No blink to threat  With manual eye opening would regard examiner on either side to command. EOM's intact  Pupils equal, round and reactive.   + corneal reflexes  Face symmetric at rest, exam limited given ETT  + cough/gag with   Minimal flexion and grimace to pain in the LUE  WTP in BLE's  No response to pain in the RUE.  
Brief note:  Patient seen and examined.    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     Suspect this is a primary ICH in the setting of uncontrolled hypertension.     She has required re-intubation and her exam has been fluctuating although overall, has remained poor and not on sedation. EEG does not indicated seizure and her CSF, in general is not indicative of infection. Suspect her comatose state is likely secondary to persistent mass effect upon her brainstem, which will take time to resolve.      - No changes to plan of care as documented by KAROL Mcneill earlier today.     EVD clamped  ICP's fluctuating between 1-3 with good wave form.     Opens eyes with loud name calling and trap pressure. Able to maintain eye opening through exam  Briefly tracks me on each side of the bed  Makes no attempts to speak around ETT  Poor attention  Not following commands    Blinks to threat bilaterally  Pupils equal, round and reactive.   + corneal reflexes  Face symmetric at rest, exam limited given ETT  + cough/gag with   Minimal flexion and grimace to pain in the LUE  WTP in BLE's  No response to pain in the RUE.  
Brief note:  Patient seen and examined.    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     Her exam has remained poor. cEEG and CSF reassuring. Suspect her comatose state is likely secondary to persistent mass effect upon her brainstem, which will take time to resolve. Failed clamping trial 2/2.      - No changes to plan of care as documented by KAROL Mcneill earlier today.     EVD OTD 5mmHg  EVD output 63ml past 12 hours  ICP's fluctuating between 1-3 with good wave form.     No eye opening for verbal or noxious stimuli  Does not regard  Makes no attempts to speak around ETT  Not following commands    No blink to threat bilaterally  Pupils equal, round and reactive.   + corneal reflexes  Face symmetric at rest, exam limited given ETT  + cough/gag with    grimace to pain in BUE  WTP in BLE's    
Brief note:  Patient seen and examined.    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     Her exam has remained poor. cEEG and CSF reassuring. Suspect her comatose state is likely secondary to persistent mass effect upon her brainstem, which will take time to resolve. Failed clamping trial 2/2.  Repeat MRI is reassuring against any secondary injury/ischemia to the brainstem/deeper structures and higher cortical structures remain intact.        Patient exam was worse this morning and had a repeat HCT that is stable.   NSGY NP drained 10 cc's of CSF from EVD with no improvement inpatient's exam.  Currently, EVD is clamped  ICP's fluctuating between 5-6 with good wave form at rest. After tactile stimulation ICP increased to 9-12  On cvEEG. Per reading epileptologist, patient having \"sporadic right temporal sharps.\" Sharps on not on ICC per Dr. Monae.   Patient did spike high fever this evening up to 102.1  ICU resident team aware.    CSF sent 2/4/25:  Glucose 82  Protein 17  Cell count  WBC 66  RBC 8500  Neutrophils 46  Lymphs 32  Monocytes 12  Macrophage 10  Number of cells 50  CSF cx (preliminary result as of 2/5/25 @ 1823 PM): NGTD thus far.   Meningitis/encephalitis panel negative      EXAM:  No eye opening with central stimulus and loud name calling  Makes no attempts to speak  Does not follow commands  Does not attend to exam.   No blink to threat   Pupils 3 mm > 2 mm equal, round and reactive.   Does not track, some EOM's with OCR  + corneal reflexes bilaterally  + cough/gag with suction  Has subtle WTP in the LUE and BLE's. Does not respond to pain in the RUE.       
Brief note:  Patient seen and examined.    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     Her exam has remained poor. cEEG and CSF reassuring. Suspect her comatose state is likely secondary to persistent mass effect upon her brainstem, which will take time to resolve. Failed clamping trial 2/2.  Repeat MRI is reassuring against any secondary injury/ischemia to the brainstem/deeper structures and higher cortical structures remain intact.      - No changes to plan of care as documented by NP MELISSA Martinez earlier today.     EVD clamped  ICP's fluctuating between 5-6 with good wave form.     Eyes open with gentle name calling/ Able to maintain eye opening throughout exam  Will briefly track me with her eyes  Makes no attempts to speak around ETT  Weakly follows commands in LUE and LLE    No blink to threat bilaterally  Pupils equal, round and reactive.   + corneal reflexes  Face symmetric at rest  Slight flexion and grimace to pain in BUE  WTP in BLE's    
Brief note:  Patient seen and examined.    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     Her exam has remained poor. Suspect her mental status is likely secondary to persistent mass effect upon her brainstem, which will take time to resolve but difficult to determine timeline. She failed EVD failed clamping trial 2/2, repeat clamping trial 2/5-2/6     Repeat MRI is reassuring against any secondary injury/ischemia to the brainstem/deeper structures and higher cortical structures remain intact.     EVD remains clamped  ICP sustaining at 4   On cvEEg. Per reading epileptologist, no seizures. However, patient is still having some sporadic right temporal sharps.   She was started on Keppra 500 mg BID earlier today.       EXAM:  No eye opening with central stimulus and loud name calling  Makes no attempts to speak  Does not follow commands  Does not attend to exam.   No blink to threat   Pupils 3 mm > 2 mm equal, round and reactive. Primary gaze slightly dysconjugate.   Does not track, some EOM's with OCR  + sluggish corneal reflexes bilaterally  + cough/gag with suction  No spontaneous movement  WTP in all four limbs. More subtle in the BUE's compared to the BLE's.   EVD dressing C/D/I  Slight bogginess over occipital region. No active drainage at incision site         
Called to bedside due to increased work of breathing, tachypnea.  On evaluation, RR in 30s-low 40s, patient using significant accessory muscle use.  SpO2 initially 90%, improved to 96% with nonrebreather.  ABG was obtained -- 7.440/37.7/70.0.  Chest x-ray prior to intubation was obtained, which was unremarkable. Throughout the night, started to become increasingly more tachycardic. Given her underlying depressed neurologic status s/p extubation and neurosurgical procedure and significant tachypnea, growing concern for hypoxic respiratory failure.  Decision was made to proceed with intubation.  Patient intubated with etomidate and rocuronium. Started on propofol gtt.    Of note, during the intubation, there was significant hardened brown material present just superior to the vocal cords, which appeared to be obstructing her airway, which improved with suctioning.  Likely old blood.    Will follow-up post intubation CXR and KUB    Alexander Buenrostro MD  Internal Medicine, PGY-1  Contact via [place]    
Dr. Kay reviewed HCT and stated that there is a clot near the tip of the EVD catheter and advised flushing EVD towards patient with 2 mL's of sterile preservative free saline. Under sterile aseptic technique, EVD catheter was flushed toward the patient with 2 mL of sterile preservative free saline. EVD was zeroed and leveled at 10. ICP post flush was 10 with even better wave form than earlier. Post flush, patient did not have any output into the drip chamber. This was discussed with Dr. Kay. He advised dropping the EVD temporarily to the floor to see if there was a steady stream of CSF from the EVD. I did as instructed and EVD was noted to have a steady flow of CSF and drained 5 cc's into the drip chamber within 15 seconds so as not to over drain. EVD raised back up, leveled to the patient's tragus, and zeroed. ICP 7 with good wave form.  Per Dr. Kay, he would like the EVD to be open to drain at 5 mmHg instead of 10 mmHg. EVD adjusted according to attending physician order. Per Dr. Kay, if patient still does not have output from EVD after adjustment to 5 mmHg, he would like for me to flush EVD catheter again with 2 mL's of sterile preservative free saline toward the patient to try and dislodge any clots that may be abutting the tip of the catheter. If patient still does not have any output, then he would like TPA to be injected into the EVD.     Currently EVD is now OTD at 5, with ICP reading of 6 and with a good wave form.  Plan of care discussed with MARY Capone.   
EVD exchanged today by Dr. Kay for worsening hydrocephalus on HCT and MRI  EVD zeroed and leveled, OTD at 10 with good wave form  ICP at time of exam 6  Patient no longer following commands. Per RN, patient stopped following commands on day shift for the day RN and EVD was exchanged. She still has not been following commands since EVD exchange. Message sent to Dr. Kay to notify him of patient exam not improving despite exchanging EVD.       PHYSICAL EXAM:  Vitals:    01/27/25 1845 01/27/25 1900 01/27/25 2000 01/27/25 2100   BP: (!) 176/74 (!) 160/74 (!) 172/80 (!) 140/69   Pulse: 80 74 82 77   Resp: 22 20 21 20   Temp:   98.6 °F (37 °C)    TempSrc:   Axillary    SpO2: 99% 98% 98% 98%   Weight:       Height:             General: Intubated, no distress  Neurologic  Mental status:   Eyes closed upon entering the room. Intubated. Barely opens left eye with loud name calling and central stimulus with sternal rub. Makes no attempts to speak around ETT. Does not nod to yes/no questions. Lethargic and does not attend to exam. She is not following commands in any of her extremities.     Cranial nerves:   CN2: No blink to threat  CN 3,4,6: Pupils 3 mm > 2 mm equal and reactive to light, extraocular muscles intact w/ OCR  CN5: + corneal reflexes bilaterally  CN7: Face symmetric at rest, exam limited given ETT  CN8-12: Weak cough and gag with suctioning.     Motor Exam:  No spontaneous movement. No longer following commands in any of her limbs.     Sensory: WTP in bilateral upper extremities. Briskly WTP in BLE's.   Cerebellar/coordination: TAO, given patient mentation.   Tone: normal in all 4 extremities  Gait: Deferred for safety  
EVD output for the past hour was 0. ICP was 3. EVD catheter flushed with 8 mL of preservative free sterile saline toward the gtt chamber, and had 2 mL of preservative free sterile saline flushed toward the patient under sterile aseptic technique. ICP reading after flushing EVD was 7 with good wave form. EVD was OTD at 5 and was noted to have CSF dripping into drip chamber. Per Dr. Kay, will plan to instill TPA into EVD later this morning. TPA ordered. Rn instructed to call when it arrives to bedside.   
Family requested to speak w/ Dr. Kay. He was able to come to the bedside today to discuss her care and prognosis. After discussion family has made decision to transition to comfort measures. They would like to wait till tomorrow morning around 10am to disconnect patient from the ventilator. At this time the family was not interested in talking with hospice.   Palliative care following. Hospitalist and critical care team updated.     CHANTAL Lind - CNP  02/09/25  4:41 PM    
Intubated   Not on sedation  EVD OTD at 10  Zeroed and Leveld  ICP sustaining at 10 mmHg    General: Intubated, no distress, well-nourished  Neurologic  Mental status:   Opened both eyes with loud name calling and some tactile stimulation. Required manual eye opening of the right eye. Intubated, TAO speech. Requires repeated verbal cues to attend exam. Does follow commands. Was able to weakly wiggle her toes, left more brisk than right. Had subtle hand squeeze on the left to command, which was reproducible. Would not squeeze my fingers with her right hand. Would not give thumbs up on either side.         Cranial nerves:   CN2: No blink to threat.  CN 3,4,6: Pupils 3 mm > 2 mm equal and reactive to light, EOM's with OCR  CN5: + corneal reflex bilaterally  CN7: Face symmetric at rest, exam limited given ETT  CN8: Hearing intact  CN9: + cough/gag     Motor / Sensory Exam:  RUE: no spontaneous movement. Does not follow commands. WTP  LUE: 3/5Hand  to command. No spontaneous movement otherwise. WTP    RLE: No spontaneous movement. Would wiggle toes, not as briskly or as strongly as the left. WTP  LLE: No spontaneous movement. Would wiggle toes to command. WTP     Tone: normal in all 4 extremities  Gait: Deferred for safety  Drain: EVD dressing C/D/I     OTHER SYSTEMS:  Cardiovascular: Warm, appears well perfused   Respiratory: Easy, non-labored respiratory pattern   Abdominal: Abdomen is without distention   Extremities: Upper and lower extremities are atraumatic in appearance without deformity. No swelling or erythema.                     
Patient s/p R frontal EVD and suboccipital decompression and hematoma evacuation. EVD open to drain at 5mmHg. Initially, some bloody drainage with clots. Waveform was poor and tidaling in the tubing had stopped. 2mL CSF aspirated with clot from patient side of  EVD, and 4mL flushed towards collection chamber to clear clots. Waveform improved.     With sedation paused, patient will open eyes to pain, and withdraw from pain x4.     CT head w/o contrast: 1/22/25 at 1939  IMPRESSION:  1. Posterior fossa craniotomy with partial evacuation of hematoma with associated pneumocephalus, slight interval decrease of mass effect.     2. There is a right frontal ventriculostomy catheter projecting into the left lateral ventricle with interval development of intraventricular hemorrhage in the left lateral ventricle, as well as extra-axial acute fluid collections over the right frontal region, subarachnoid hemorrhage, and extra-axial bleeding along the falx without midline shift. Interval decrease of hydrocephalus.      SBP goal remains <140mmHg. Will keep intubated overnight. No other changes to plan of care at this time. Please call with any exam changes or questions.    Rei Combs, CHANTAL - CNP   Neurology & Neurocritical Care   1/22/2025 10:45 PM    ICU Patients:   Neurocritical Care Line: [phone]  [facility]: [facility]    
  Problem: Infection - Adult  Goal: Absence of infection at discharge  Outcome: Progressing  Goal: Absence of infection during hospitalization  Outcome: Progressing  Goal: Absence of fever/infection during anticipated neutropenic period  Outcome: Progressing     Problem: Metabolic/Fluid and Electrolytes - Adult  Goal: Electrolytes maintained within normal limits  Outcome: Progressing  Goal: Hemodynamic stability and optimal renal function maintained  Outcome: Progressing  Goal: Glucose maintained within prescribed range  Outcome: Progressing     
denies change in vision. She denies neck pain and back pain. She denies numbness and tingling in all extremities. She endorses generalized weakness. She is no longer nauseous. NPO for OR. She was having regular BM and voiding spontaneously before arrival. She denies fever and chills. Denies chest pain and shortness of breath.\"    I  ICU Admission Reason & Brief ICU Course:     ICU admission for acute large ICH with severe mass effect. Had emergent suboccipital decompression with hematoma evacuation and right frontal EVD placement. Initially extubated on 01/24 after this procedure. Re-intubated later that day d/t respiratory failure from depressed neurologic function. She was started on vancomycin after BAL revealed staph, this was changed to ceftriaxone after MRSA was negative. She received 5 days total of abx. She required cardene gtt for hypertensive emergency, was weaned off to oral medications. Patient remained largely obtunded even without sedation. Family decided to pursue tracheostomy on 02/03, followed by PEG placement on 02/05. Palliative care was consulted for GOC discussions. They eventually decided to focus on patient's comfort due to her lack of neurological improvement. She was extubated on 02/10, with plans to initiate inpatient Hospice on 02/11.        C  Code Status/DPOA Info/Goals of Care/ACP Note:   -Code Status DNR  -POA/ACP documentation?: yes - Andrea Labarbara, spouse  -Any changes to goals of care? no  -is palliative care consulted? yes       U Unprescribing & Pertinent High-Risk Medications  -Anticoagulation:  None - reason: ICH, and DNR-CC   -[ ] VTE Prophylaxis  -[X] None - reason: ICH  -[ ] Therapeutic anticoagulation    -Antibiotics:  N/A - no current planned antimicrobials   -[] GI PPX : no    P Pending Tests at the Time of Transfer  None    Procedures   Intubationyes: placement date01/24, removal dateconverted to trach on 02/03, any failed extubations yes, if yes failed extubation on 
infection  Outcome: Progressing  Goal: Oral mucous membranes remain intact  Outcome: Progressing     
redness and/or skin breakdown   Every 4-6 hours minimum: Change oxygen saturation probe site  Goal: Incisions, wounds, or drain sites healing without S/S of infection  Outcome: Progressing  Goal: Oral mucous membranes remain intact  Outcome: Progressing     Problem: Musculoskeletal - Adult  Goal: Return mobility to safest level of function  Outcome: Progressing  Goal: Maintain proper alignment of affected body part  Outcome: Progressing  Goal: Return ADL status to a safe level of function  Outcome: Progressing     Problem: Gastrointestinal - Adult  Goal: Minimal or absence of nausea and vomiting  Outcome: Progressing  Goal: Maintains or returns to baseline bowel function  Outcome: Progressing  Goal: Maintains adequate nutritional intake  Outcome: Progressing  Goal: Establish and maintain optimal ostomy function  Outcome: Progressing     
(restraint for interference with medical device):   Determine that other, less restrictive measures have been tried or would not be effective before applying the restraint   Evaluate the patient's condition at the time of restraint application   Inform patient/family regarding the reason for restraint   Every 2 hours: Monitor safety, psychosocial status, comfort, nutrition and hydration     
facilitate oxygenation and minimize respiratory effort   Oxygen supplementation based on oxygen saturation or arterial blood gases   Initiate smoking cessation protocol as indicated     Problem: Cardiovascular - Adult  Goal: Maintains optimal cardiac output and hemodynamic stability  1/26/2025 0011 by Ashley Fletcher RN  Outcome: Progressing  1/25/2025 1859 by Vicky Hale RN  Outcome: Progressing  Flowsheets (Taken 1/25/2025 0800)  Maintains optimal cardiac output and hemodynamic stability:   Monitor urine output and notify Licensed Independent Practitioner for values outside of normal range   Monitor blood pressure and heart rate   Assess for signs of decreased cardiac output  Goal: Absence of cardiac dysrhythmias or at baseline  1/26/2025 0011 by Ashley Fletcher RN  Outcome: Progressing  1/25/2025 1859 by Vicky Hale RN  Outcome: Progressing  Flowsheets (Taken 1/25/2025 0800)  Absence of cardiac dysrhythmias or at baseline:   Monitor cardiac rate and rhythm   Assess for signs of decreased cardiac output   Administer antiarrhythmia medication and electrolyte replacement as ordered     Problem: Skin/Tissue Integrity - Adult  Goal: Skin integrity remains intact  1/26/2025 0011 by Ashley Fletcher RN  Outcome: Progressing  1/25/2025 1859 by Vicky Hale RN  Outcome: Progressing  Flowsheets (Taken 1/25/2025 0800)  Skin Integrity Remains Intact:   Monitor for areas of redness and/or skin breakdown   Assess vascular access sites hourly   Every 4-6 hours minimum: Change oxygen saturation probe site   Every 4-6 hours: If on nasal continuous positive airway pressure, respiratory therapy assesses nares and determine need for appliance change or resting period  Goal: Incisions, wounds, or drain sites healing without S/S of infection  1/26/2025 0011 by Ashley Fletcher RN  Outcome: Progressing  1/25/2025 1859 by Vicky Hale RN  Outcome: Progressing  Flowsheets (Taken 1/25/2025 0800)  Incisions, 
fears, and concerns, and demonstrate effective coping:   Assist patient/family to identify coping skills, available support systems and cultural and spiritual values   Provide emotional support, including active listening and acknowledgement of concerns of patient and caregivers   Reduce environmental stimuli, as able     Problem: Safety - Medical Restraint  Goal: Remains free of injury from restraints (Restraint for Interference with Medical Device)  Description: INTERVENTIONS:  1. Determine that other, less restrictive measures have been tried or would not be effective before applying the restraint  2. Evaluate the patient's condition at the time of restraint application  3. Inform patient/family regarding the reason for restraint  4. Q2H: Monitor safety, psychosocial status, comfort, nutrition and hydration  1/26/2025 0854 by Ava Lema RN  Outcome: Progressing  Flowsheets  Taken 1/26/2025 0800 by Ava Lema RN  Remains free of injury from restraints (restraint for interference with medical device):   Determine that other, less restrictive measures have been tried or would not be effective before applying the restraint   Evaluate the patient's condition at the time of restraint application   Inform patient/family regarding the reason for restraint   Every 2 hours: Monitor safety, psychosocial status, comfort, nutrition and hydration  Taken 1/26/2025 0600 by Ashley Fletcher RN  Remains free of injury from restraints (restraint for interference with medical device):   Determine that other, less restrictive measures have been tried or would not be effective before applying the restraint   Evaluate the patient's condition at the time of restraint application   Inform patient/family regarding the reason for restraint   Every 2 hours: Monitor safety, psychosocial status, comfort, nutrition and hydration  Taken 1/26/2025 0400 by Ashley Fletcher RN  Remains free of injury from restraints (restraint for interference 
patent:   Assess patency of urinary catheter   Irrigate catheter per Licensed Independent Practitioner order if indicated and notify Licensed Independent Practitioner if unable to irrigate     Problem: Infection - Adult  Goal: Absence of infection at discharge  Outcome: Progressing  Flowsheets (Taken 1/28/2025 0800)  Absence of infection at discharge:   Monitor lab/diagnostic results   Assess and monitor for signs and symptoms of infection  Goal: Absence of infection during hospitalization  Outcome: Progressing  Flowsheets (Taken 1/28/2025 0800)  Absence of infection during hospitalization:   Assess and monitor for signs and symptoms of infection   Monitor lab/diagnostic results  Goal: Absence of fever/infection during anticipated neutropenic period  Outcome: Progressing  Flowsheets (Taken 1/28/2025 0800)  Absence of fever/infection during anticipated neutropenic period:   Monitor white blood cell count   Administer growth factors as ordered     Problem: Metabolic/Fluid and Electrolytes - Adult  Goal: Electrolytes maintained within normal limits  Outcome: Progressing  Flowsheets (Taken 1/28/2025 0800)  Electrolytes maintained within normal limits:   Monitor labs and assess patient for signs and symptoms of electrolyte imbalances   Administer electrolyte replacement as ordered  Goal: Hemodynamic stability and optimal renal function maintained  Outcome: Progressing  Flowsheets (Taken 1/28/2025 0800)  Hemodynamic stability and optimal renal function maintained:   Monitor labs and assess for signs and symptoms of volume excess or deficit   Monitor intake, output and patient weight  Goal: Glucose maintained within prescribed range  Outcome: Progressing  Flowsheets (Taken 1/28/2025 0800)  Glucose maintained within prescribed range:   Assess for signs and symptoms of hyperglycemia and hypoglycemia   Monitor blood glucose as ordered     Problem: Hematologic - Adult  Goal: Maintains hematologic stability  Outcome: 
and/or skin breakdown   Every 4-6 hours minimum: Change oxygen saturation probe site  Taken 2/5/2025 0800  Skin Integrity Remains Intact:   Monitor for areas of redness and/or skin breakdown   Every 4-6 hours minimum: Change oxygen saturation probe site  Goal: Incisions, wounds, or drain sites healing without S/S of infection  2/6/2025 0104 by Luis Enrique Santiago, RN  Outcome: Progressing  Flowsheets  Taken 2/6/2025 0000  Incisions, Wounds, or Drain Sites Healing Without Sign and Symptoms of Infection:   ADMISSION and DAILY: Assess and document risk factors for pressure ulcer development   TWICE DAILY: Assess and document skin integrity   TWICE DAILY: Assess and document dressing/incision, wound bed, drain sites and surrounding tissue  Taken 2/5/2025 2140  Incisions, Wounds, or Drain Sites Healing Without Sign and Symptoms of Infection:   ADMISSION and DAILY: Assess and document risk factors for pressure ulcer development   TWICE DAILY: Assess and document dressing/incision, wound bed, drain sites and surrounding tissue   TWICE DAILY: Assess and document skin integrity  Taken 2/5/2025 2000  Incisions, Wounds, or Drain Sites Healing Without Sign and Symptoms of Infection:   ADMISSION and DAILY: Assess and document risk factors for pressure ulcer development   TWICE DAILY: Assess and document skin integrity   TWICE DAILY: Assess and document dressing/incision, wound bed, drain sites and surrounding tissue  2/5/2025 1419 by Madelyn Livingston, RN  Outcome: Progressing  Flowsheets  Taken 2/5/2025 0832  Incisions, Wounds, or Drain Sites Healing Without Sign and Symptoms of Infection:   ADMISSION and DAILY: Assess and document risk factors for pressure ulcer development   TWICE DAILY: Assess and document skin integrity   TWICE DAILY: Assess and document dressing/incision, wound bed, drain sites and surrounding tissue  Taken 2/5/2025 0800  Incisions, Wounds, or Drain Sites Healing Without Sign and Symptoms of Infection:   
safety, psychosocial status, comfort, nutrition and hydration  Taken 1/30/2025 0000 by Luis Enrique Santiago RN  Remains free of injury from restraints (restraint for interference with medical device):   Determine that other, less restrictive measures have been tried or would not be effective before applying the restraint   Evaluate the patient's condition at the time of restraint application   Inform patient/family regarding the reason for restraint   Every 2 hours: Monitor safety, psychosocial status, comfort, nutrition and hydration  Taken 1/29/2025 2200 by Luis Enrique Santiago RN  Remains free of injury from restraints (restraint for interference with medical device):   Determine that other, less restrictive measures have been tried or would not be effective before applying the restraint   Evaluate the patient's condition at the time of restraint application   Inform patient/family regarding the reason for restraint   Every 2 hours: Monitor safety, psychosocial status, comfort, nutrition and hydration  Taken 1/29/2025 2000 by Luis Enrique Santiago RN  Remains free of injury from restraints (restraint for interference with medical device):   Determine that other, less restrictive measures have been tried or would not be effective before applying the restraint   Evaluate the patient's condition at the time of restraint application   Inform patient/family regarding the reason for restraint   Every 2 hours: Monitor safety, psychosocial status, comfort, nutrition and hydration  Taken 1/29/2025 1800 by Eriberto Mejía RN  Remains free of injury from restraints (restraint for interference with medical device): Determine that other, less restrictive measures have been tried or would not be effective before applying the restraint  Taken 1/29/2025 1600 by Eriberto Mejía RN  Remains free of injury from restraints (restraint for interference with medical device): Determine that other, less restrictive measures have been tried or 
Monitor safety, psychosocial status, comfort, nutrition and hydration  Outcome: Not Progressing  Flowsheets  Taken 2/2/2025 0600 by Kait Bazan RN  Remains free of injury from restraints (restraint for interference with medical device):   Determine that other, less restrictive measures have been tried or would not be effective before applying the restraint   Evaluate the patient's condition at the time of restraint application   Inform patient/family regarding the reason for restraint   Every 2 hours: Monitor safety, psychosocial status, comfort, nutrition and hydration  Taken 2/2/2025 0400 by Kait Bazan RN  Remains free of injury from restraints (restraint for interference with medical device):   Determine that other, less restrictive measures have been tried or would not be effective before applying the restraint   Evaluate the patient's condition at the time of restraint application   Inform patient/family regarding the reason for restraint   Every 2 hours: Monitor safety, psychosocial status, comfort, nutrition and hydration  Taken 2/2/2025 0200 by Kait Bazan RN  Remains free of injury from restraints (restraint for interference with medical device):   Determine that other, less restrictive measures have been tried or would not be effective before applying the restraint   Evaluate the patient's condition at the time of restraint application   Inform patient/family regarding the reason for restraint   Every 2 hours: Monitor safety, psychosocial status, comfort, nutrition and hydration  Taken 2/2/2025 0000 by Kait Bazan RN  Remains free of injury from restraints (restraint for interference with medical device):   Determine that other, less restrictive measures have been tried or would not be effective before applying the restraint   Evaluate the patient's condition at the time of restraint application   Inform patient/family regarding the reason for restraint   Every 2 hours: Monitor safety,

## 2025-02-12 NOTE — DISCHARGE INSTR - COC
or Other Treatments After Discharge: ***    Physician Certification: I certify the above information and transfer of Mraia Antonia Crawford  is necessary for the continuing treatment of the diagnosis listed and that she requires {Admit to Appropriate Level of Care:53854} for {GREATER/LESS:124322908} 30 days.     Update Admission H&P: {CHP DME Changes in HandP:932988812}    PHYSICIAN SIGNATURE:  {Esignature:[id number]}

## 2025-02-15 LAB
BACTERIA CSF CULT: NORMAL
GRAM STN SPEC: NORMAL

## 2025-02-28 LAB
Lab: NORMAL
REPORT: NORMAL
THIS TEST SENT TO: NORMAL

## 2025-06-23 NOTE — PROCEDURES
[address] [address]                             PROCEDURE NOTE      PATIENT NAME: CARMEN JI            : 1945  MED REC NO: [id number]                      ROOM: 45  ACCOUNT NO: [id number]                       ADMIT DATE: 2025  PROVIDER: Cecil Mercado MD      DATE OF PROCEDURE:  2025    SURGEON:  Cecil Mercado MD    PROCEDURE:  Bronchoscopy with bronchoalveolar lavage.    PREOPERATIVE DIAGNOSES:    1. Intracranial hemorrhage.  2. Acute respiratory failure.  3. New onset fever, suspect lower respiratory infection with aspiration.    POSTOPERATIVE DIAGNOSES:    1. Intracranial hemorrhage.  2. Acute respiratory failure.  3. New onset fever, suspect lower respiratory infection with aspiration.    ASA SCORE:  II.    MALLAMPATI SCORE:  Intubated.    ANESTHESIA:  Sedation, propofol infusion.  Rocuronium single dose.    DESCRIPTION OF PROCEDURE:  The patient had previously been intubated and was maintained on mechanical ventilation.  Conscious sedation was in place with propofol infusion.  Single dose of rocuronium administered to prevent increased intracranial pressure.    The fiberoptic bronchoscope was passed via adapter through the endotracheal tube.  ETT was found to be less than 1 cm from the main beatrice.  The ETT was repositioned upward, positioned at 22 cm at the incisors.  Position of the ETT now 2-3 cm above the main beatrice.    A few areas of brownish respiratory secretions noted in the ETT, particularly distally.  There was a small collection of brownish secretions in the right mainstem bronchus, cleared readily with wash and suctioning.  The right bronchial tree was noted to be normal in its anatomy to the segmental level.  Bronchial mucosa was normal.  There were no mucosal lesions.    The left main bronchus and lobar bronchi were mildly to moderately inflamed.  There was some area of 
EEG reviewed upto 9 pm     The interictal EEG was abnormal due to abundant, generalized blunt sharp wave  discharges suggestive of cortical irritability. The triphasic wave sharp wave discharges with  diffuse delta slowing admixed with rare theta frequencies suggestive of severe encephalopathy.     Electronically signed by Tyron Burrows MD on 1/28/2025 at 9:15 PM    
PROCEDURE NOTE  Date: 1/24/2025   Name: Maria Antonia Crawford  YOB: 1945    Intubation    Date/Time: 1/24/2025 6:47 AM    Performed by: Marcela Tinoco DO  Authorized by: Marcela Tinoco DO  Consent: The procedure was performed in an emergent situation.  Required items: required blood products, implants, devices, and special equipment available  Patient identity confirmed: hospital-assigned identification number and arm band  Time out: Immediately prior to procedure a \"time out\" was called to verify the correct patient, procedure, equipment, support staff and site/side marked as required.  Indications: respiratory distress  Intubation method: video-assisted  Patient status: paralyzed (RSI)  Preoxygenation: BVM  Sedatives: etomidate  Paralytic: rocuronium  Tube size: 8.0 mm  Number of attempts: 1  Ventilation between attempts: BVM  Post-procedure assessment: ETCO2 monitor  Breath sounds: equal  Cuff inflated: yes  Tube secured with: ETT mota  Chest x-ray interpreted by me and radiologist.  Chest x-ray findings: endotracheal tube in appropriate position  Patient tolerance: patient tolerated the procedure well with no immediate complications        Marcela Tinoco DO  Internal Medicine Resident , PGY-2  7:30 AM          
PROCEDURE NOTE  Date: 1/29/2025   Name: Maria Antonia Crawford  YOB: 1945    Procedures      LONG-TERM EEG-VIDEO MONITORING   CLINICAL NEUROPHYSIOLOGY LABORATORY  DEPARTMENT OF NEUROLOGY  [facility]     Patient: Maria Antonia Crawford  Age: 79 y.o.  MRN: [id number]    Referring Physician: No ref. provider found  History: The patient is a 79 y.o. female who presented breakthrough seizure/encephalopathy. This long-term video-EEG monitoring study was performed to determine the nature of the patient's clinical events. The patient is on neuroactive medications.   Maria Antonia Crawford   Current Facility-Administered Medications   Medication Dose Route Frequency Provider Last Rate Last Admin    cefTRIAXone (ROCEPHIN) 1,000 mg in sterile water 10 mL IV syringe  1,000 mg IntraVENous Q24H Woody Nation DO   1,000 mg at 01/28/25 1219    pantoprazole (PROTONIX) 40 mg in sodium chloride (PF) 0.9 % 10 mL injection  40 mg IntraVENous Daily Bronson Cole DO   40 mg at 01/29/25 0827    senna (SENOKOT) tablet 8.6 mg  1 tablet Oral Nightly Bronson Cole DO   8.6 mg at 01/28/25 2005    enalaprilat (VASOTEC) injection 1.25 mg  1.25 mg IntraVENous 4 times per day Woody Nation DO   1.25 mg at 01/29/25 0531    balsum peru-castor oil (VENELEX) ointment   Topical BID Bang Gonzalez MD   Given at 01/29/25 0836    carvedilol (COREG) tablet 12.5 mg  12.5 mg Per NG tube BID Woody Nation DO   12.5 mg at 01/29/25 0827    hydrALAZINE (APRESOLINE) injection 5 mg  5 mg IntraVENous Q4H PRN Woody Nation DO   5 mg at 01/29/25 0827    labetalol (NORMODYNE;TRANDATE) injection 10 mg  10 mg IntraVENous Q4H PRN Rei Combs APRN - CNP   10 mg at 01/29/25 0147    oxyCODONE (ROXICODONE) immediate release tablet 5 mg  5 mg Oral Q4H PRN Karlo Garrido APRN - CNP        Or    oxyCODONE (ROXICODONE) immediate release tablet 10 mg  10 mg Oral Q4H PRN Karlo Garrido, APRN - CNP        
PROCEDURE NOTE  Date: 1/29/2025   Name: Maria Antonia Crawford  YOB: 1945    Procedures      LONG-TERM EEG-VIDEO MONITORING   CLINICAL NEUROPHYSIOLOGY LABORATORY  DEPARTMENT OF NEUROLOGY  [facility]     Patient: Maria Antonia Crawford  Age: 79 y.o.  MRN: [id number]    Referring Physician: No ref. provider found  History: The patient is a 79 y.o. female who presented breakthrough seizure/encephalopathy. This long-term video-EEG monitoring study was performed to determine the nature of the patient's clinical events. The patient is on neuroactive medications.   Maria Antonia Crawford   Current Facility-Administered Medications   Medication Dose Route Frequency Provider Last Rate Last Admin    lisinopril (PRINIVIL;ZESTRIL) tablet 5 mg  5 mg Per NG tube Daily Woody Nation DO   5 mg at 01/29/25 1344    modafinil (PROVIGIL) tablet 100 mg  100 mg Oral Daily Teodoro Anderson, DO   100 mg at 01/29/25 1344    folic acid (FOLVITE) tablet 1 mg  1 mg Oral Daily Woody Nation DO   1 mg at 01/29/25 1344    CENTRUM/CERTA-TREMAINE with minerals oral solution 15 mL  15 mL Per G Tube Daily Woody Nation DO   15 mL at 01/29/25 1727    pantoprazole (PROTONIX) 40 mg in sodium chloride (PF) 0.9 % 10 mL injection  40 mg IntraVENous Daily Bronson Cole DO   40 mg at 01/29/25 0827    senna (SENOKOT) tablet 8.6 mg  1 tablet Oral Nightly Bronson Cole DO   8.6 mg at 01/28/25 2005    balsum peru-castor oil (VENELEX) ointment   Topical BID Bang Gonzalez MD   Given at 01/29/25 2028    hydrALAZINE (APRESOLINE) injection 5 mg  5 mg IntraVENous Q4H PRN Woody Nation DO   5 mg at 01/29/25 0827    labetalol (NORMODYNE;TRANDATE) injection 10 mg  10 mg IntraVENous Q4H PRN Rei Combs APRN - CNP   10 mg at 01/29/25 1804    oxyCODONE (ROXICODONE) immediate release tablet 5 mg  5 mg Oral Q4H PRN Karlo Garrido APRN - CNP        Or    oxyCODONE (ROXICODONE) immediate release tablet 10 mg  10 mg 
PROCEDURE NOTE  Date: 2/3/2025   Name: Maria Antonia Crawford  YOB: 1945    Procedures    PROCEDURE: Diagnostic fiberoptic bronchoscopy for trach insertion     Preprocedure diagnosis: acute respiratory failure   Post procedure diagnosis: same      DESCRIPTION OF PROCEDURE: Informed consent was obtained from the patient after explaining the risks and benefits. A time out was taken.    Mallampati: intubated   ASA IV  Anesthesia:        Propofol drip 50 mg/kg/min plus 50mg bolus x2   Fentanyl 100 mcg IV plus 50 mcg IV x2   Rocuronium 50mg      Bronchoscope was inserted into the main airway via the ETT.    Scope was maintained at the tip of ETT during tracheostomy.  ETT was pulled gradually out with scope to provide access for trach needle insertion.  Please refer to Dr. Lujan's note for details.    Access needle, dilators and trach tube insertion was done under direct bronchoscopic visualization.    At the end of procedure scope was inserted into the new trach.  It was in proper position.  There was good hemostasis.      Of note during the procedure ICP was monitored.  Due to increased ICP EVD was opened to drain briefly to maintain pressure less than 15.      The patient tolerated the procedure well.  No immediate complication    EBL: none     
PROCEDURE NOTE  Date: 2/6/2025   Name: Maria Antonia Crawford  YOB: 1945    Procedures              Referring physician: Dr. Garrido  Date: 2/7/2025  Start Time:2/6/2025 @ 1624  End Time:2/7/2025 @ 1624    Indication  Patient with encephalopathy, EEG done to rule out subclinical seizures.       Introduction  This continuous video-EEG was acquired using a [employer] workstation at 256 samples/s. Electrodes were placed according to the International 10-20 system. Automated spike and seizure detection algorithms were applied. Video was recorded during this study.    Description  The background consistent of diffuse none reactive polymorphic delta and theta slowing with brief periods of attenuation.   No consistent focal slowing or interhemispheric asymmetry was noted. Normal sleep structures were not observed. There were few to occasional right temporal sharps.    Events  No events reported.       Impression  Abnormal continuous vEEG recording, the slowing mentioned above suggests moderate non specific encephalopathy.     The presence of right temporal sharps increases risk for coal seizures an may suggest underlying focal lesion.    Clyde Monae MD  Epilepsy Board Certified.  Neurology Board Certified.    Electronically Signed    
PROCEDURE NOTE  Date: 2/8/2025   Name: Maria Antonia Crawford  YOB: 1945    Procedures              Referring physician: Dr. Garrido  Date: 2/8/2025  Start Time:2/7/2025 @ 1624  End Time:2/8/2025 @ 0900    Indication  Patient with encephalopathy, EEG done to rule out subclinical seizures.       Introduction  This continuous video-EEG was acquired using a [employer] workstation at 256 samples/s. Electrodes were placed according to the International 10-20 system. Automated spike and seizure detection algorithms were applied. Video was recorded during this study.    Description  The background consistent of diffuse none to minimally reactive polymorphic delta and theta slowing with brief periods of attenuation. Triphasic wave discharges confined to right hemisphere at times seen.    No consistent focal slowing or interhemispheric asymmetry was noted. Normal sleep structures were not observed. There were few  right temporal sharps, < 1 Hz and no evolution or clinical semiology associated with them.    Events  No events reported.       Impression  Abnormal continuous vEEG recording, the slowing mentioned above suggests moderate non specific encephalopathy.     The presence of right temporal sharps increases risk for coal seizures an may suggest underlying focal lesion.    No major changes from previous study.     Clyde Monae MD  Epilepsy Board Certified.  Neurology Board Certified.    Electronically Signed    
Patient just had an appointment with you on 5/19/25. Is another appointment needed to refill prescriptions?  
Pre-Op Diagnosis:  hydrocephalus    Post-Op Diagnosis: Same    Procedure: Replacement of right frontal external ventricular drain    Attending: Scar Kay MD, PhD    ANESTHESIA: Prior to the procedure, the patient's personal and family history were reviewed for any adverse reactions to anesthesia and no pertinent concerns were raised.  ASA grade: IV  Malampati: intubated    MEDICATIONS GIVEN: 1% xylocaine, subcutaneous.     EBL: minimal  Drains/Implants:silastic EVD    History and Indications: Admitted with cerebellar bleed and hydrocephalus. EVD in use for CSF leak protection and hydrocephalus. EVD became occluded, needing replacement.    Consent: The risks and benefits were discussed at length with the patient's family who understands and agrees to proceed.      Details of Procedure: The patient was positioned supine in neutral position with the head of the bed raised 30 degrees.  The site was prepped and draped in sterile fashion.  The skin was infiltrated with local anesthetic, and the existing drain was removed. The ventricular catheter was placed perpendicular to the surface of the skull with CSF under pressure identified at 5cm.  The catheter was advanced to 7 cm without the stylet, tunneled and secured with a suture after capping the catheter.  The incision was closed with staples.  We then attached the catheter to the drainage system and confirmed a good waveform.  The site was dressed with a sterile, occlusive dressing.        Complications: None immediate    Disposition: Stable in ICU    
  Vent was then connected into the new airway.  Trach was sutured and strapped.  Blood loss is minimal.    Patient tolerated the procedure well.  There was no immediate complication. Vitals and pulse ox were stable through out.    CXR is pending.      Bang Le \"Kehinde\" Tai Forde MD  Pulmonary and Critical Care Medicine

## (undated) DEVICE — BLADE CLIPPER SURG SENSICLIP

## (undated) DEVICE — AGENT HEMOSTATIC SURGIFLOW MATRIX KIT W/THROMBIN

## (undated) DEVICE — CODMAN® RANEY SCALP CLIPS 20 UNITS OF 10 CLIPS: Brand: CODMAN®

## (undated) DEVICE — CATHETER SCLERO L240CM NDL 25GA L4MM SHTH DIA2.3MM CNTRST

## (undated) DEVICE — AGENT HEMOSTATIC SURG ORIGINAL ABS 2X14IN LOOSE KNIT 12/CA

## (undated) DEVICE — TOOL MR8-9BA50 MR8 9CM BALL 5MM: Brand: MIDAS REX MR8

## (undated) DEVICE — CATHETER DIL 6FR L180CM BLLN INFLATED 36-40.5-45FR L8CM ES

## (undated) DEVICE — SUTURE VICRYL + SZ 2-0 L18IN ABSRB VLT L26MM SH 1/2 CIR VCP775D

## (undated) DEVICE — NEPTUNE E-SEP SMOKE EVACUATION PENCIL, COATED, 70MM BLADE, PUSH BUTTON SWITCH: Brand: NEPTUNE E-SEP

## (undated) DEVICE — PRESSURE TUBING: Brand: TRUWAVE

## (undated) DEVICE — AGENT HEMSTAT W2XL4IN OXIDIZED REGENERATED CELOS ABSRB SFT

## (undated) DEVICE — PRECISION SPECIMEN CONTAINER 4 OZ (118 ML) • POSITIVE SEAL INDICATOR OR PACKAGED: Brand: PRECISION

## (undated) DEVICE — APPLICATOR MEDICATED 10.5 CC SOLUTION HI LT ORNG CHLORAPREP

## (undated) DEVICE — SUTURE MONOCRYL + SZ 4-0 L27IN ABSRB UD L19MM PS-2 3/8 CIR MCP426H

## (undated) DEVICE — BAG DRNGE 9OZ L9.5IN BILE W/ ADPT TWO ADJ RUB BELT DISP FOR

## (undated) DEVICE — SPONGE GZ W4XL8IN COT WVN 12 PLY

## (undated) DEVICE — 3M™ STERI-STRIP™ REINFORCED ADHESIVE SKIN CLOSURES, R1548, 1 IN X 5 IN (25 MM X 125 MM), 4 STRIPS/ENVELOPE: Brand: 3M™ STERI-STRIP™

## (undated) DEVICE — SUTURE NRLN SZ 4-0 L18IN NONABSORBABLE BLK L13MM TF 1/2 CIR C584D

## (undated) DEVICE — SOLUTION IV 1000ML 0.9% SOD CHL

## (undated) DEVICE — SUTURE VICRYL + SZ 2-0 L18IN ABSRB UD CT1 L36MM 1/2 CIR VCP839D

## (undated) DEVICE — SPONGE,NEURO,1"X1",XR,STRL,LF,10/PK: Brand: MEDLINE

## (undated) DEVICE — CODMAN® BACTISEAL® CLEAR EVD CATHETER SET: Brand: CODMAN® BACTISEAL®

## (undated) DEVICE — TUBING

## (undated) DEVICE — SYRINGE MEDICAL 3ML CLEAR PLASTIC STANDARD NON CONTROL LUERLOCK TIP DISPOSABLE

## (undated) DEVICE — CRANI: Brand: MEDLINE INDUSTRIES, INC.

## (undated) DEVICE — GLOVE SURG SZ 8 L11.6IN THK9.8MIL STRW LTX POLYMER BEAD CUF

## (undated) DEVICE — SEALANT SURG 13 YR DURA AUTOSPRAY ADHERUS

## (undated) DEVICE — TRAP FLUID

## (undated) DEVICE — GARMENT,MEDLINE,DVT,INT,CALF,MED, GEN2: Brand: MEDLINE

## (undated) DEVICE — SURGIFOAM SPNG SZ 100

## (undated) DEVICE — KIT CATHETER 20GA L12CM ART CUST

## (undated) DEVICE — ACCUDRAIN® EXTERNAL CSF DRAINAGE SYSTEM: Brand: ACCUDRAIN®

## (undated) DEVICE — SUTURE VICRYL + SZ 3-0 L18IN ABSRB UD SH 1/2 CIR TAPERCUT NDL VCP864D

## (undated) DEVICE — Device

## (undated) DEVICE — TOWEL,STOP FLAG GOLD N-W: Brand: MEDLINE

## (undated) DEVICE — TOOL MR8-14MH30 MR8 14CM MATCH 3MM: Brand: MIDAS REX MR8

## (undated) DEVICE — LIQUIBAND RAPID ADHESIVE 36/CS 0.8ML: Brand: MEDLINE

## (undated) DEVICE — SYRINGE MED 10ML TRNSLUC BRL PLUNG BLK MRK POLYPR CTRL

## (undated) DEVICE — ENDOVIVE SFT PEG KIT PULL WENFIT 20F BX2

## (undated) DEVICE — DRESSING FOAM DISK DIA1IN H 7MM HYDRPHLC CHG IMPREG IN SL

## (undated) DEVICE — PIN ADLT MAYFIELD RIGID MOLD FINGER

## (undated) DEVICE — COVER LT HNDL CAM BLU DISP W/ SURG CTRL

## (undated) DEVICE — PRESSURE MONITORING SET: Brand: TRUWAVE, VAMP PLUS

## (undated) DEVICE — DISPOSABLE STANDARD BIPOLAR FORCEPS, NON-STICK,: Brand: SPETZLER-MALIS

## (undated) DEVICE — FORCEPS BX L240CM JAW DIA2.4MM ORNG L CAP W/ NDL DISP RAD

## (undated) DEVICE — TOOL MR8-F2/7TA23 MR8 F2/7CM TAPER 2.3MM: Brand: MIDAS REX MR8

## (undated) DEVICE — CATHETER 46419 EDM LUMBAR 80CM W/TIP

## (undated) DEVICE — BAG DRAINAGE BILE 8 IN 9 OZ T TUBE ADPT 2 ADJ BELT TO COLL

## (undated) DEVICE — CANNULA SAMP CO2 AD GRN 7FT CO2 AND 7FT O2 TBNG UNIV CONN